# Patient Record
Sex: FEMALE | Race: WHITE | NOT HISPANIC OR LATINO | Employment: FULL TIME | URBAN - METROPOLITAN AREA
[De-identification: names, ages, dates, MRNs, and addresses within clinical notes are randomized per-mention and may not be internally consistent; named-entity substitution may affect disease eponyms.]

---

## 2017-09-14 ENCOUNTER — ALLSCRIPTS OFFICE VISIT (OUTPATIENT)
Dept: OTHER | Facility: OTHER | Age: 42
End: 2017-09-14

## 2017-09-14 LAB — HBA1C MFR BLD HPLC: 6.1 %

## 2018-01-11 ENCOUNTER — GENERIC CONVERSION - ENCOUNTER (OUTPATIENT)
Dept: OTHER | Facility: OTHER | Age: 43
End: 2018-01-11

## 2018-01-11 DIAGNOSIS — Z12.31 ENCOUNTER FOR SCREENING MAMMOGRAM FOR MALIGNANT NEOPLASM OF BREAST: ICD-10-CM

## 2018-01-11 DIAGNOSIS — R19.09 OTHER INTRA-ABDOMINAL AND PELVIC SWELLING, MASS AND LUMP: ICD-10-CM

## 2018-01-11 NOTE — MISCELLANEOUS
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)   3  Encounter for routine gynecological examination (V72 31) (Z01 419)   4  Never A Smoker   5  Prediabetes (790 29) (R73 09)   6  Screening for skin condition (V82 0) (Z13 89)   7  Seborrheic keratosis (702 19) (L82 1)   8  Skin tag (701 9) (L91 8)   9  Visit for screening mammogram (V76 12) (Z12 31)    Current Meds   1  Albuterol Sulfate (2 5 MG/3ML) 0 083% Inhalation Nebulization Solution; Therapy: (Recorded:37Awu8001) to Recorded   2  Allergy TABS; Therapy: (Recorded:19Iiq9438) to Recorded   3  Seasonique 0 15-0 03 &0 01 MG Oral Tablet; Take 1 tablet daily; Therapy: [id number] to (Evaluate:16Jun2016)  Requested for: [id number]; Last   Rx:22Jun2015 Ordered    Allergies    1  Aspirin TABS   2   Contrast Media Ready-Box MISC    Plan  Encounter for routine gynecological examination    · From  Seasonique 0 15-0 03 &0 01 MG Oral Tablet Take 1 tablet daily To  Camrese 0 15-0 03 &0 01 MG Oral Tablet Take 1 tablet daily    Signatures   Electronically signed by : Ambar Quick, ; Jun 29 2016  8:16AM EST                       (Author)

## 2018-01-13 VITALS
SYSTOLIC BLOOD PRESSURE: 135 MMHG | TEMPERATURE: 99.2 F | BODY MASS INDEX: 45.84 KG/M2 | HEART RATE: 111 BPM | WEIGHT: 275.13 LBS | OXYGEN SATURATION: 99 % | HEIGHT: 65 IN | DIASTOLIC BLOOD PRESSURE: 85 MMHG

## 2018-01-14 DIAGNOSIS — R53.83 OTHER FATIGUE: ICD-10-CM

## 2018-01-14 DIAGNOSIS — Z11.59 ENCOUNTER FOR SCREENING FOR OTHER VIRAL DISEASES: ICD-10-CM

## 2018-01-14 DIAGNOSIS — R73.03 PREDIABETES: ICD-10-CM

## 2018-01-14 NOTE — RESULT NOTES
Message   Recorded as Task   Date: 11/07/2016 11:55 AM, Created By: Cira Peterson   Task Name: Verify Patient Results   Assigned To: Hallie Foley   Regarding Patient: Oneal Singh, Status: Active   CommentPalmilincoln Bills - 07 Nov 2016 11:55 AM        Hallie Foley - 08 Nov 2016 10:57 AM     TASK EDITED  card sent that pap was normal        Signatures   Electronically signed by : Jovi Martins CNM;  Nov 8 2016 10:57AM EST                       (Author)

## 2018-01-24 VITALS
HEIGHT: 65 IN | BODY MASS INDEX: 45.48 KG/M2 | SYSTOLIC BLOOD PRESSURE: 122 MMHG | DIASTOLIC BLOOD PRESSURE: 70 MMHG | WEIGHT: 273 LBS

## 2018-01-30 ENCOUNTER — TELEPHONE (OUTPATIENT)
Dept: OBGYN CLINIC | Facility: CLINIC | Age: 43
End: 2018-01-30

## 2018-01-30 NOTE — TELEPHONE ENCOUNTER
Called rx to pharm, pt aware     Pt called as seasonique was not sent, but ordered by louis at 01/11 visit

## 2018-08-23 ENCOUNTER — APPOINTMENT (OUTPATIENT)
Dept: LAB | Facility: CLINIC | Age: 43
End: 2018-08-23
Payer: COMMERCIAL

## 2018-08-23 ENCOUNTER — TRANSCRIBE ORDERS (OUTPATIENT)
Dept: LAB | Facility: CLINIC | Age: 43
End: 2018-08-23

## 2018-08-23 DIAGNOSIS — R53.83 OTHER FATIGUE: ICD-10-CM

## 2018-08-23 DIAGNOSIS — Z11.59 ENCOUNTER FOR SCREENING FOR OTHER VIRAL DISEASES: ICD-10-CM

## 2018-08-23 DIAGNOSIS — R73.03 PREDIABETES: ICD-10-CM

## 2018-08-23 LAB
ALBUMIN SERPL BCP-MCNC: 3.4 G/DL (ref 3.5–5)
ALP SERPL-CCNC: 99 U/L (ref 46–116)
ALT SERPL W P-5'-P-CCNC: 22 U/L (ref 12–78)
ANION GAP SERPL CALCULATED.3IONS-SCNC: 8 MMOL/L (ref 4–13)
AST SERPL W P-5'-P-CCNC: 12 U/L (ref 5–45)
BASOPHILS # BLD AUTO: 0.02 THOUSANDS/ΜL (ref 0–0.1)
BASOPHILS NFR BLD AUTO: 0 % (ref 0–1)
BILIRUB SERPL-MCNC: 0.73 MG/DL (ref 0.2–1)
BUN SERPL-MCNC: 13 MG/DL (ref 5–25)
CALCIUM SERPL-MCNC: 9.2 MG/DL (ref 8.3–10.1)
CHLORIDE SERPL-SCNC: 106 MMOL/L (ref 100–108)
CO2 SERPL-SCNC: 25 MMOL/L (ref 21–32)
CREAT SERPL-MCNC: 0.75 MG/DL (ref 0.6–1.3)
EOSINOPHIL # BLD AUTO: 0.2 THOUSAND/ΜL (ref 0–0.61)
EOSINOPHIL NFR BLD AUTO: 3 % (ref 0–6)
ERYTHROCYTE [DISTWIDTH] IN BLOOD BY AUTOMATED COUNT: 13.7 % (ref 11.6–15.1)
EST. AVERAGE GLUCOSE BLD GHB EST-MCNC: 148 MG/DL
GFR SERPL CREATININE-BSD FRML MDRD: 99 ML/MIN/1.73SQ M
GLUCOSE P FAST SERPL-MCNC: 146 MG/DL (ref 65–99)
HBA1C MFR BLD: 6.8 % (ref 4.2–6.3)
HCT VFR BLD AUTO: 37.6 % (ref 34.8–46.1)
HGB BLD-MCNC: 11.7 G/DL (ref 11.5–15.4)
IMM GRANULOCYTES # BLD AUTO: 0.02 THOUSAND/UL (ref 0–0.2)
IMM GRANULOCYTES NFR BLD AUTO: 0 % (ref 0–2)
LYMPHOCYTES # BLD AUTO: 1.61 THOUSANDS/ΜL (ref 0.6–4.47)
LYMPHOCYTES NFR BLD AUTO: 20 % (ref 14–44)
MCH RBC QN AUTO: 28.3 PG (ref 26.8–34.3)
MCHC RBC AUTO-ENTMCNC: 31.1 G/DL (ref 31.4–37.4)
MCV RBC AUTO: 91 FL (ref 82–98)
MONOCYTES # BLD AUTO: 0.44 THOUSAND/ΜL (ref 0.17–1.22)
MONOCYTES NFR BLD AUTO: 5 % (ref 4–12)
NEUTROPHILS # BLD AUTO: 5.81 THOUSANDS/ΜL (ref 1.85–7.62)
NEUTS SEG NFR BLD AUTO: 72 % (ref 43–75)
NRBC BLD AUTO-RTO: 0 /100 WBCS
PLATELET # BLD AUTO: 236 THOUSANDS/UL (ref 149–390)
PMV BLD AUTO: 12.8 FL (ref 8.9–12.7)
POTASSIUM SERPL-SCNC: 4 MMOL/L (ref 3.5–5.3)
PROT SERPL-MCNC: 7.3 G/DL (ref 6.4–8.2)
RBC # BLD AUTO: 4.13 MILLION/UL (ref 3.81–5.12)
SODIUM SERPL-SCNC: 139 MMOL/L (ref 136–145)
T4 FREE SERPL-MCNC: 0.85 NG/DL (ref 0.76–1.46)
TSH SERPL DL<=0.05 MIU/L-ACNC: 3.81 UIU/ML (ref 0.36–3.74)
WBC # BLD AUTO: 8.1 THOUSAND/UL (ref 4.31–10.16)

## 2018-08-23 PROCEDURE — 84443 ASSAY THYROID STIM HORMONE: CPT

## 2018-08-23 PROCEDURE — 80053 COMPREHEN METABOLIC PANEL: CPT

## 2018-08-23 PROCEDURE — 86803 HEPATITIS C AB TEST: CPT

## 2018-08-23 PROCEDURE — 84439 ASSAY OF FREE THYROXINE: CPT

## 2018-08-23 PROCEDURE — 83036 HEMOGLOBIN GLYCOSYLATED A1C: CPT

## 2018-08-23 PROCEDURE — 85025 COMPLETE CBC W/AUTO DIFF WBC: CPT

## 2018-08-24 LAB — HCV AB SER QL: NORMAL

## 2018-08-31 ENCOUNTER — OFFICE VISIT (OUTPATIENT)
Dept: INTERNAL MEDICINE CLINIC | Facility: CLINIC | Age: 43
End: 2018-08-31
Payer: COMMERCIAL

## 2018-08-31 VITALS
BODY MASS INDEX: 45.48 KG/M2 | WEIGHT: 273 LBS | OXYGEN SATURATION: 98 % | TEMPERATURE: 99.7 F | DIASTOLIC BLOOD PRESSURE: 84 MMHG | RESPIRATION RATE: 18 BRPM | HEIGHT: 65 IN | HEART RATE: 97 BPM | SYSTOLIC BLOOD PRESSURE: 126 MMHG

## 2018-08-31 DIAGNOSIS — J45.909 ASTHMA, UNSPECIFIED ASTHMA SEVERITY, UNSPECIFIED WHETHER COMPLICATED, UNSPECIFIED WHETHER PERSISTENT: ICD-10-CM

## 2018-08-31 DIAGNOSIS — E11.9 TYPE 2 DIABETES MELLITUS WITHOUT COMPLICATION, WITHOUT LONG-TERM CURRENT USE OF INSULIN (HCC): ICD-10-CM

## 2018-08-31 DIAGNOSIS — E66.01 OBESITY, MORBID (HCC): Primary | ICD-10-CM

## 2018-08-31 PROBLEM — R19.09 RIGHT GROIN MASS: Status: ACTIVE | Noted: 2018-01-11

## 2018-08-31 PROBLEM — R53.83 OTHER FATIGUE: Status: ACTIVE | Noted: 2018-08-31

## 2018-08-31 PROBLEM — E78.1 HYPERTRIGLYCERIDEMIA: Status: ACTIVE | Noted: 2018-08-31

## 2018-08-31 PROCEDURE — 1036F TOBACCO NON-USER: CPT | Performed by: NURSE PRACTITIONER

## 2018-08-31 PROCEDURE — 3008F BODY MASS INDEX DOCD: CPT | Performed by: NURSE PRACTITIONER

## 2018-08-31 PROCEDURE — 99214 OFFICE O/P EST MOD 30 MIN: CPT | Performed by: NURSE PRACTITIONER

## 2018-08-31 RX ORDER — ALBUTEROL SULFATE 2.5 MG/3ML
SOLUTION RESPIRATORY (INHALATION)
COMMUNITY
End: 2021-01-08 | Stop reason: SDUPTHER

## 2018-08-31 NOTE — PATIENT INSTRUCTIONS
1  Diabetes-A1c 6 8  Medications discussed, not interested at this time, would like to try diet and exercise first and recheck A1c in three months  Will refer to diabetes education  Risks benefits discussed  2  Elevated TSH-will recheck level  3  Asthma-controlled, does not need rescue inhaler  4  Obesity-motivated to lose weight  [facility]  Instructed on how to increase dosage on a weekly basis  Side effects discussed  Please make sure you are watching your diet-limiting concentrated sweets and white starches as we discussed  Start exercising daily-even a short walk is helpful  Down load the Lose It vineet to assist in calorie counting  Set a fitness goal such as a 5k, and you can use the Couch to [address] [place] [address] check for Lyme, mono  Follow up in three months

## 2018-08-31 NOTE — PROGRESS NOTES
Assessment/Plan:    1  Diabetes-A1c 6 8  Medications discussed, not interested at this time, would like to try diet and exercise first and recheck A1c in three months  Will refer to diabetes education  Risks benefits discussed  2  Elevated TSH-will recheck level  3  Asthma-controlled, does not need rescue inhaler  4  Obesity-motivated to lose weight  Will start [address] [facility]  Instructed on how to increase dosage on a weekly basis  Side effects discussed  Please make sure you are watching your diet-limiting concentrated sweets and white starches as we discussed  Start exercising daily-even a short walk is helpful  Down load the Send Word Now It vineet to assist in calorie counting  Set a fitness goal such as a 5k, and you can use the Couch to [address] [place] [address] check for Lyme, mono  Follow up in three months  Diagnoses and all orders for this visit:    Obesity, morbid ([facility] 75 )  -     liraglutide (SAXENDA) injection; Start at 0 6 mg sc QD x 1 week, then increase by 0 6 mg per week until 3 mg daily is reached  -     Anaplasma Phagocytophilum, PCR; Future  -     EBV acute panel; Future  -     Lyme Antibody Profile with reflex to WB; Future  -     TSH, 3rd generation with Free T4 reflex; Future    Type 2 diabetes mellitus without complication, without long-term current use of insulin ([place] [address] )  -     Ambulatory referral to medical nutrition therapy for diabetes; Future    Asthma, unspecified asthma severity, unspecified whether complicated, unspecified whether persistent    Other orders  -     albuterol (2 5 mg/3 mL) 0 083 % nebulizer solution; Inhale        The patient was counseled regarding instructions for management, risk factor reductions, patient and family education,impressions, risks and benefits of treatment options, side effects of medications, importance of compliance with treatment  The treatment plan was reviewed with the patient/guardian and patient/guardian understands and agrees with the treatment plan  Current Outpatient Prescriptions:     albuterol (2 5 mg/3 mL) 0 083 % nebulizer solution, Inhale, Disp: , Rfl:     liraglutide (SAXENDA) injection, Start at 0 6 mg sc QD x 1 week, then increase by 0 6 mg per week until 3 mg daily is reached , Disp: 1 pen, Rfl: 5    Subjective:      Patient ID: Gary Pinzon is a 44 y o  female  Humberto Sanon is here to follow up on an isolated elevated blood pressure  She also has been feeling very fatigued over the past few weeks with low grade fever and swollen glands  The following portions of the patient's history were reviewed and updated as appropriate:   She has a past medical history of Bell's palsy; Diabetes mellitus during pregnancy; and Migraine  ,   does not have any pertinent problems on file  ,   has a past surgical history that includes  section and Hysteroscopy  ,  family history includes Cervical cancer in her maternal aunt; Heart disease in her father; Hyperlipidemia in her father; Hypertension in her father; Migraines in her mother  ,   reports that she has never smoked  She has never used smokeless tobacco  She reports that she drinks alcohol  She reports that she does not use drugs  ,  is allergic to aspirin and iodinated diagnostic agents       Review of Systems   Constitutional: Negative  Respiratory: Negative  Cardiovascular: Negative  Musculoskeletal: Negative  Psychiatric/Behavioral: Negative            Objective:  /84 (BP Location: Left arm)   Pulse 97   Temp 99 7 °F (37 6 °C)   Resp 18   Ht 5' 5" (1 651 m)   Wt 124 kg (273 lb)   SpO2 98%   BMI 45 43 kg/m²     Lab Review  Transcribe Orders on 2018   Component Date Value    WBC 2018 8 10     RBC 2018 4 13     Hemoglobin 2018 11 7     Hematocrit 2018 37 6     MCV 2018 91     MCH 2018 28 3     MCHC 2018 31 1*    RDW 2018 13 7     MPV 2018 12 8*    Platelets  236     nRBC 2018 0     Neutrophils Relative 08/23/2018 72     Immat GRANS % 08/23/2018 0     Lymphocytes Relative 08/23/2018 20     Monocytes Relative 08/23/2018 5     Eosinophils Relative 08/23/2018 3     Basophils Relative 08/23/2018 0     Neutrophils Absolute 08/23/2018 5 81     Immature Grans Absolute 08/23/2018 0 02     Lymphocytes Absolute 08/23/2018 1 61     Monocytes Absolute 08/23/2018 0 44     Eosinophils Absolute 08/23/2018 0 20     Basophils Absolute 08/23/2018 0 02     Sodium 08/23/2018 139     Potassium 08/23/2018 4 0     Chloride 08/23/2018 106     CO2 08/23/2018 25     ANION GAP 08/23/2018 8     BUN 08/23/2018 13     Creatinine 08/23/2018 0 75     Glucose, Fasting 08/23/2018 146*    Calcium 08/23/2018 9 2     AST 08/23/2018 12     ALT 08/23/2018 22     Alkaline Phosphatase 08/23/2018 99     Total Protein 08/23/2018 7 3     Albumin 08/23/2018 3 4*    Total Bilirubin 08/23/2018 0 73     eGFR 08/23/2018 99     TSH 3RD GENERATON 08/23/2018 3 810*    Hepatitis C Ab 08/23/2018 Non-reactive     Hemoglobin A1C 08/23/2018 6 8*    EAG 08/23/2018 148     Free T4 08/23/2018 0 85         Imaging: No results found  Physical Exam   Constitutional: She is oriented to person, place, and time  She appears well-developed and well-nourished  Cardiovascular: Normal rate, regular rhythm, normal heart sounds and intact distal pulses  Pulmonary/Chest: Effort normal and breath sounds normal    Musculoskeletal: Normal range of motion  Neurological: She is alert and oriented to person, place, and time  She has normal reflexes  Psychiatric: She has a normal mood and affect   Her behavior is normal  Judgment and thought content normal

## 2019-01-24 ENCOUNTER — TRANSCRIBE ORDERS (OUTPATIENT)
Dept: LAB | Facility: CLINIC | Age: 44
End: 2019-01-24

## 2019-01-24 ENCOUNTER — OFFICE VISIT (OUTPATIENT)
Dept: INTERNAL MEDICINE CLINIC | Facility: CLINIC | Age: 44
End: 2019-01-24
Payer: COMMERCIAL

## 2019-01-24 ENCOUNTER — APPOINTMENT (OUTPATIENT)
Dept: LAB | Facility: CLINIC | Age: 44
End: 2019-01-24
Payer: COMMERCIAL

## 2019-01-24 VITALS
BODY MASS INDEX: 46.65 KG/M2 | WEIGHT: 280 LBS | SYSTOLIC BLOOD PRESSURE: 180 MMHG | HEART RATE: 90 BPM | OXYGEN SATURATION: 99 % | DIASTOLIC BLOOD PRESSURE: 82 MMHG | RESPIRATION RATE: 18 BRPM | HEIGHT: 65 IN | TEMPERATURE: 98.9 F

## 2019-01-24 DIAGNOSIS — E03.9 HYPOTHYROIDISM, UNSPECIFIED TYPE: ICD-10-CM

## 2019-01-24 DIAGNOSIS — E66.01 MORBID OBESITY (HCC): ICD-10-CM

## 2019-01-24 DIAGNOSIS — E66.01 OBESITY, MORBID (HCC): ICD-10-CM

## 2019-01-24 DIAGNOSIS — R53.83 OTHER FATIGUE: ICD-10-CM

## 2019-01-24 DIAGNOSIS — E11.9 TYPE 2 DIABETES MELLITUS WITHOUT COMPLICATION, WITHOUT LONG-TERM CURRENT USE OF INSULIN (HCC): ICD-10-CM

## 2019-01-24 DIAGNOSIS — E66.01 OBESITY, MORBID (HCC): Primary | ICD-10-CM

## 2019-01-24 DIAGNOSIS — E78.1 HYPERTRIGLYCERIDEMIA: ICD-10-CM

## 2019-01-24 LAB
SL AMB POCT HEMOGLOBIN AIC: 6.6 (ref ?–6.5)
T4 FREE SERPL-MCNC: 0.91 NG/DL (ref 0.76–1.46)
TSH SERPL DL<=0.05 MIU/L-ACNC: 3.97 UIU/ML (ref 0.36–3.74)

## 2019-01-24 PROCEDURE — 3044F HG A1C LEVEL LT 7.0%: CPT | Performed by: INTERNAL MEDICINE

## 2019-01-24 PROCEDURE — 3008F BODY MASS INDEX DOCD: CPT | Performed by: INTERNAL MEDICINE

## 2019-01-24 PROCEDURE — 84443 ASSAY THYROID STIM HORMONE: CPT

## 2019-01-24 PROCEDURE — 87798 DETECT AGENT NOS DNA AMP: CPT

## 2019-01-24 PROCEDURE — 83036 HEMOGLOBIN GLYCOSYLATED A1C: CPT | Performed by: INTERNAL MEDICINE

## 2019-01-24 PROCEDURE — 1036F TOBACCO NON-USER: CPT | Performed by: INTERNAL MEDICINE

## 2019-01-24 PROCEDURE — 99214 OFFICE O/P EST MOD 30 MIN: CPT | Performed by: INTERNAL MEDICINE

## 2019-01-24 PROCEDURE — 86665 EPSTEIN-BARR CAPSID VCA: CPT

## 2019-01-24 PROCEDURE — 86664 EPSTEIN-BARR NUCLEAR ANTIGEN: CPT

## 2019-01-24 PROCEDURE — 86618 LYME DISEASE ANTIBODY: CPT

## 2019-01-24 PROCEDURE — 36415 COLL VENOUS BLD VENIPUNCTURE: CPT

## 2019-01-24 PROCEDURE — 86663 EPSTEIN-BARR ANTIBODY: CPT

## 2019-01-24 PROCEDURE — 84439 ASSAY OF FREE THYROXINE: CPT

## 2019-01-24 RX ORDER — LEVOTHYROXINE SODIUM 0.03 MG/1
25 TABLET ORAL DAILY
Qty: 30 TABLET | Refills: 2 | Status: SHIPPED | OUTPATIENT
Start: 2019-01-24 | End: 2019-04-20 | Stop reason: SDUPTHER

## 2019-01-24 NOTE — PATIENT INSTRUCTIONS
Basic Carbohydrate Counting   AMBULATORY CARE:   Carbohydrate counting  is a way to plan your meals by counting the amount of carbohydrate in foods  Carbohydrates are the sugars, starches, and fiber found in fruit, grains, vegetables, and milk products  Carbohydrates increase your blood sugar levels  Carbohydrate counting can help you eat the right amount of carbohydrate to keep your blood sugar levels under control  What you need to know about planning meals using carbohydrate counting:  · A dietitian or healthcare provider will help you develop a healthy meal plan that works best for you  You will be taught how much carbohydrate to eat or drink for each meal and snack  Your meal plan will be based on your age, weight, usual food intake, and physical activity level  If you have diabetes, it will also include your blood sugar levels and diabetes medicine  Once you know how much carbohydrate you should eat, you can decide what type of food you want to eat  · You will need to know what foods contain carbohydrate and how much they contain  Keep track of the amount of carbohydrate in meals and snacks in order to follow your meal plan  Do not avoid carbohydrates or skip meals  Your blood sugar may fall too low if you do not eat enough carbohydrate or you skip meals  Foods that contain carbohydrate:   · Breads:  Each serving of food listed below contains about 15 g of carbohydrate   ¨ 1 slice of bread (1 ounce) or 1 flour or corn tortilla (6 inch)    ¨ ½ of a hamburger bun or ¼ of a large bagel (about 1 ounce)    ¨ 1 pancake (about 4 inches across and ¼ inch thick)    · Cereals and grains:  Serving sizes of ready-to-eat cereals vary  Look at the serving size and the total carbohydrate amount listed on the food label  Each serving of food listed below contains about 15 g of carbohydrate       ¨ ¾ cup of dry, unsweetened, ready-to-eat cereal or ¼ cup of low-fat granola     ¨ ½ cup of oatmeal or other cooked cereal ¨ ? cup of cooked rice or pasta    · Starchy vegetables and beans:  Each serving of food listed below contains about 15 g of carbohydrate   ¨ ½ cup of corn, green peas, sweet potatoes, or mashed potatoes    ¨ ¼ of a large baked potato    ¨ ½ cup of beans, lentils, and peas (garbanzo, lewis, kidney, white, split, black-eyed)    · Crackers and snacks:  Each serving of food listed below contains about 15 g of carbohydrate   ¨ 3 carlos cracker squares or 8 animal crackers     ¨ 6 saltine-type crackers    ¨ 3 cups of popcorn or ¾ ounce of pretzels, potato chips, or tortilla chips    · Fruit:  Each serving of food listed below contains about 15 g of carbohydrate   ¨ 1 small (4 ounce) piece of fresh fruit or ¾ to 1 cup of fresh fruit    ¨ ½ cup of canned or frozen fruit, packed in natural juice    ¨ ½ cup (4 ounces) of unsweetened fruit juice    ¨ 2 tablespoons of dried fruit    · Desserts or sugary foods:  Each serving of food listed below contains about 15 g of carbohydrate   ¨ 2-inch square unfrosted cake or brownie     ¨ 2 small cookies    ¨ ½ cup of ice cream, frozen yogurt, or nondairy frozen yogurt    ¨ ¼ cup of sherbet or sorbet    ¨ 1 tablespoon of regular syrup, jam, or jelly    ¨ 2 tablespoons of light syrup    · Milk and yogurt:  Foods from the milk group contain about 12 g of carbohydrate per serving  ¨ 1 cup of fat-free or low-fat milk    ¨ 1 cup of soy milk    ¨ ? cup of fat-free, yogurt sweetened with artificial sweetener    · Non-starchy vegetables:  Each serving contains about 5 g of carbohydrate   Three servings of non-starch vegetables count as 1 carbohydrate serving  ¨ ½ cup of cooked vegetables or 1 cup of raw vegetables  This includes beets, broccoli, cabbage, cauliflower, cucumber, mushrooms, tomatoes, and zucchini    ¨ ½ cup of vegetable juice  How to use carbohydrate counting to plan meals:   · Count carbohydrate amounts using serving sizes:      ¨ Pasta dinner example:   You plan to have pasta, tossed salad, and an 8-ounce glass of milk  Your healthcare provider tells you that you may have 4 carbohydrate servings for dinner  One carbohydrate serving of pasta is ? cup  One cup of pasta will equal 3 carbohydrate servings  An 8-ounce glass of milk will count as 1 carbohydrate serving  These amounts of food would equal 4 carbohydrate servings  One cup of tossed salad does not count toward your carbohydrate servings  · Count carbohydrate amounts using food labels:  Find the total amount of carbohydrate in a packaged food by reading the food label  Food labels tell you the serving size of the food and the total carbohydrate amount in each serving  Find the serving size on the food label and then decide how many servings you will eat  Multiply the number of servings you plan to eat by the carbohydrate amount per serving  ¨ Granola bar snack example: Your meal plan allows you to have 2 carbohydrate servings (30 grams) of carbohydrate for a snack  You plan to eat 1 package of granola bars, which contains 2 bars  According to the food label, the serving size of food in this package is 1 bar  Each serving (1 bar) contains 25 grams of carbohydrate  The total amount of carbohydrate in this package of granola bars would be 50 g  Based on your meal plan, you should eat only 1 bar  Follow up with your healthcare provider as directed:  Write down your questions so you remember to ask them during your visits  © 2017 [address] Information is for End User's use only and may not be sold, redistributed or otherwise used for commercial purposes  All illustrations and images included in CareNotes® are the copyrighted property of [employer] , [employer]  or Drake Bonilla  The above information is an  only  It is not intended as medical advice for individual conditions or treatments   Talk to your doctor, nurse or pharmacist before following any medical regimen to see if it is safe and effective for you  Hypotension   WHAT YOU NEED TO KNOW:   What is hypotension? Hypotension is a condition that causes your blood pressure (BP) to drop lower than it should be  Hypotension may be mild, serious, or life-threatening  What are the most common types of hypotension? · Acute:  Acute hypotension is a sudden drop in your BP that may be life-threatening  · Constitutional:  Constitutional hypotension means your BP is lower than it should be most or all of the time  This is a chronic condition that occurs with no known medical cause  · Orthostatic:  Orthostatic hypotension normally occurs when you stand up from a sitting or lying position  It is also called postural hypotension  · Postprandial:  Postprandial hypotension means your BP becomes too low after you eat a meal  Your BP may drop within 2 hours after you eat, and is more common when you eat meals high in carbohydrates  What causes hypotension? · Anemia or blood loss    · Nervous system, heart, or adrenal disorders    · Dehydration from not drinking enough liquids, frequent vomiting, diarrhea, or severe burns    · Some medicines, such as those used to treat high blood pressure, heart conditions, pain, depression, or cancer    · A blood infection (sepsis)  What increases my risk for hypotension? · Increasing age    · Drug and alcohol use    · Being bedridden for a long period of time    · Low body weight    · Hemodialysis    · Medical conditions such as diabetes, Parkinson disease, and Alzheimer disease  What are the signs and symptoms of hypotension?    · Feeling anxious, nauseated, tired, or weak    · Lightheadedness, dizziness, or fainting    · Increased sweating, palpitations (fast, forceful heartbeats), tremors, or a seizure     · Headache or pain in your neck, shoulders, chest, lower back, buttocks, or legs    · Blurred vision or changes in vision    · Confusion, decreased memory, or inability to pay attention  How is hypotension diagnosed? Your healthcare provider will ask about your symptoms, health conditions, and medicines  Tell him how often you have symptoms, and if they change during the day  Tell him if you recently have had diarrhea, vomiting, or blood loss  Your healthcare provider will carefully examine you, listen to your heart, and may check your eyes  He may check your body movements and sensations (ability to feel something that touches you)  You may also need the following:  · Blood pressure testing: This may be done while you lie down, sit, and stand  You may need to wear a BP monitor to record your BP for up to 24 hours  · Tilt table testing: You are secured on a table that changes your position  Your BP is checked when the table moves you into each position  What tests may help find the cause of my hypotension? Many times, hypotension is a symptom of another condition  You may need any of the following tests to find the cause of your hypotension:  · Blood tests:  A sample of your blood or urine may be checked for anemia or other conditions causing your hypotension  · EKG: This test records the electrical activity of your heart  It is used to check for damage or other heart problems that may be causing your hypotension  · Autonomic nervous system tests:  Your healthcare provider may check for changes in how fast your heart beats when you take deep breaths  He may also check for changes in your BP while you put your hand in ice cold water  · 24 hour urine test: During this test you will need to collect all of your urine for 24 hours  You will urinate into a container and the urine will be put into a jug  The jug will need to be kept cold  If you urinate during the night, you will need to save that urine  Caregivers will measure and record how much you urinate  At the end of 24 hours, the urine will be sent to a lab for tests      · Echocardiogram:  This is a type of ultrasound, also called an echo  An ultrasound uses sound waves to show pictures of your heart on a monitor  An ultrasound may be done to show how your heart moves when it beats  How is hypotension treated? Your healthcare provider will work with you to find the cause of your hypotension and help treat your symptoms  You may need the following:  · Compression stockings or abdominal binder: These may help blood return to your heart and decrease your hypotension  · IV fluids: These may be used to increase your BP if you are dehydrated or have blood loss or sepsis  · Medicines:      ¨ Alpha-adrenoreceptor agonists: These medicines may increase your BP and decrease your symptoms  ¨ Steroids: This medicine helps prevent salt loss from your body  Steroids may also help increase the amount of fluid in your body and raise your BP  ¨ Vasopressors: These medicines help constrict (make smaller) your blood vessels and increase your BP  Vasopressor medicines may increase the blood flow to your brain and help decrease your symptoms  ¨ Antidiuretic hormone: This medicine helps control your BP and helps decrease your need to urinate during the night  ¨ Antiparkinson medicine: This medicine may help increase your standing BP and decrease your symptoms  What are the risks of hypotension? Without treatment, your symptoms may get worse  You may faint or fall often, which can lead to injuries, such as a broken bone  You may be at increased risk for depression, confusion, and memory problems  Hypotension may cause decreased blood flow to your brain and heart  This may lead to a stroke or heart attack and can be life-threatening  Sepsis-related hypotension is life-threatening without treatment  How can I manage my symptoms? · Change your position slowly:  When you get out of bed, sit up first, then slowly move your legs to the side of the bed  If you are not having any symptoms, slowly stand up   If you have symptoms, sit down right away  · Avoid straining:  Activities and movements that cause you to strain can cause a drop in your BP  Activities to avoid include lifting, coughing, and other movements that increase the feeling of pressure in your chest     · Avoid the heat: This can cause a decrease in your BP  Stay inside during very hot days, or limit the amount of time you are outside  Do not take hot baths  · Exercise and do physical counter maneuvers:  Ask your healthcare provider about the best exercise plan for you  Physical counter maneuvers may help to increase your BP and increase blood flow to your heart  They include crossing your legs, squatting, and bending at the waist  You can also rise up on your toes while you are standing, and tighten your thigh muscles  · Drink liquids as directed:  Ask your healthcare provider how much liquid to drink each day and which liquids are best for you  Drink 500 milliliters (½ liter) of liquid quickly in the morning or before meals to help increase your BP  Your healthcare provider may tell you to drink 2 cups of coffee with, or after, breakfast and lunch  The caffeine in the coffee can help prevent a drop in your BP  Your healthcare provider may also give you caffeine pills  · Change how you eat meals: If your BP drops after you eat large meals, try to eat smaller meals more often  Eat foods low in carbohydrates and cholesterol to help prevent BP drops after you eat  Ask if you need to increase the amount of sodium (salt) you eat each day  · Raise the head of your bed:  Raise the head of your bed 4 to 8 inches  This can help prevent morning BP drops and decrease the need to urinate during the night  · Do not drink alcohol:  Alcohol can make your symptoms worse  Ask for information if you need help quitting  When should I contact my healthcare provider? · You vomit several times or have diarrhea, and you cannot drink liquid      · You have a fever      · You have new or increased symptoms, such as dizziness, weakness, or fainting  · Your legs, ankles, and feet are swollen, or you gain weight for no known reason  · You have questions or concerns about your condition or care  When should I seek immediate care or call 911? · You become confused or cannot speak  · You urinate very little or not at all  · You have a seizure  · You have chest pain or trouble breathing  · You have changes in vision or cannot see  CARE AGREEMENT:   You have the right to help plan your care  Learn about your health condition and how it may be treated  Discuss treatment options with your caregivers to decide what care you want to receive  You always have the right to refuse treatment  The above information is an  only  It is not intended as medical advice for individual conditions or treatments  Talk to your doctor, nurse or pharmacist before following any medical regimen to see if it is safe and effective for you  © 2017 [facility] Information is for End User's use only and may not be sold, redistributed or otherwise used for commercial purposes  All illustrations and images included in CareNotes® are the copyrighted property of [employer]  or Drake Chad  Obesity   AMBULATORY CARE:   Obesity  is when your body mass index (BMI) is greater than 30  Your healthcare provider will use your height and weight to measure your BMI  The risks of obesity include  many health problems, such as injuries or physical disability  You may need tests to check for the following:  · Diabetes     · High blood pressure or high cholesterol     · Heart disease     · Gallbladder or liver disease     · Cancer of the colon, breast, prostate, liver, or kidney     · Sleep apnea     · Arthritis or gout  Seek care immediately if:   · You have a severe headache, confusion, or difficulty speaking       · You have weakness on one side of your body  · You have chest pain, sweating, or shortness of breath  Contact your healthcare provider if:   · You have symptoms of gallbladder or liver disease, such as pain in your upper abdomen  · You have knee or hip pain and discomfort while walking  · You have symptoms of diabetes, such as intense hunger and thirst, and frequent urination  · You have symptoms of sleep apnea, such as snoring or daytime sleepiness  · You have questions or concerns about your condition or care  Treatment for obesity  focuses on helping you lose weight to improve your health  Even a small decrease in BMI can reduce the risk for many health problems  Your healthcare provider will help you set a weight-loss goal   · Lifestyle changes  are the first step in treating obesity  These include making healthy food choices and getting regular physical activity  Your healthcare provider may suggest a weight-loss program that involves coaching, education, and therapy  · Medicine  may help you lose weight when it is used with a healthy diet and physical activity  · Surgery  can help you lose weight if you are very obese and have other health problems  There are several types of weight-loss surgery  Ask your healthcare provider for more information  Be successful losing weight:   · Set small, realistic goals  An example of a small goal is to walk for 20 minutes 5 days a week  Anther goal is to lose 5% of your body weight  · Tell friends, family members, and coworkers about your goals  and ask for their support  Ask a friend to lose weight with you, or join a weight-loss support group  · Identify foods or triggers that may cause you to overeat , and find ways to avoid them  Remove tempting high-calorie foods from your home and workplace  Place a bowl of fresh fruit on your kitchen counter  If stress causes you to eat, then find other ways to cope with stress  · Keep a diary to track what you eat and drink  Also write down how many minutes of physical activity you do each day  Weigh yourself once a week and record it in your diary  Eating changes: You will need to eat 500 to 1,000 fewer calories each day than you currently eat to lose 1 to 2 pounds a week  The following changes will help you cut calories:  · Eat smaller portions  Use small plates, no larger than 9 inches in diameter  Fill your plate half full of fruits and vegetables  Measure your food using measuring cups until you know what a serving size looks like  · Eat 3 meals and 1 or 2 snacks each day  Plan your meals in advance  Jania Rodarte and eat at home most of the time  Eat slowly  · Eat fruits and vegetables at every meal   They are low in calories and high in fiber, which makes you feel full  Do not add butter, margarine, or cream sauce to vegetables  Use herbs to season steamed vegetables  · Eat less fat and fewer fried foods  Eat more baked or grilled chicken and fish  These protein sources are lower in calories and fat than red meat  Limit fast food  Dress your salads with olive oil and vinegar instead of bottled dressing  · Limit the amount of sugar you eat  Do not drink sugary beverages  Limit alcohol  Activity changes:  Physical activity is good for your body in many ways  It helps you burn calories and build strong muscles  It decreases stress and depression, and improves your mood  It can also help you sleep better  Talk to your healthcare provider before you begin an exercise program   · Exercise for at least 30 minutes 5 days a week  Start slowly  Set aside time each day for physical activity that you enjoy and that is convenient for you  It is best to do both weight training and an activity that increases your heart rate, such as walking, bicycling, or swimming  · Find ways to be more active  Do yard work and housecleaning  Walk up the stairs instead of using elevators   Spend your leisure time going to events that require walking, such as outdoor festivals or fairs  This extra physical activity can help you lose weight and keep it off  Follow up with your healthcare provider as directed: You may need to meet with a dietitian  Write down your questions so you remember to ask them during your visits  © 2017 [facility] Information is for End User's use only and may not be sold, redistributed or otherwise used for commercial purposes  All illustrations and images included in CareNotes® are the copyrighted property of [employer] M [employer]  or Drake Bonilla  The above information is an  only  It is not intended as medical advice for individual conditions or treatments  Talk to your doctor, nurse or pharmacist before following any medical regimen to see if it is safe and effective for you  Weight Management   AMBULATORY CARE:   Why it is important to manage your weight:  Being overweight increases your risk of health conditions such as heart disease, high blood pressure, type 2 diabetes, and certain types of cancer  It can also increase your risk for osteoarthritis, sleep apnea, and other respiratory problems  Aim for a slow, steady weight loss  Even a small amount of weight loss can lower your risk of health problems  How to lose weight safely:  A safe and healthy way to lose weight is to eat fewer calories and get regular exercise  You can lose up about 1 pound a week by decreasing the number of calories you eat by 500 calories each day  You can decrease calories by eating smaller portion sizes or by cutting out high-calorie foods  Read labels to find out how many calories are in the foods you eat  You can also burn calories with exercise such as walking, swimming, or biking  You will be more likely to keep weight off if you make these changes part of your lifestyle     Healthy meal plan for weight management:  A healthy meal plan includes a variety of foods, contains fewer calories, and helps you stay healthy  A healthy meal plan includes the following:  · Eat whole-grain foods more often  A healthy meal plan should contain fiber  Fiber is the part of grains, fruits, and vegetables that is not broken down by your body  Whole-grain foods are healthy and provide extra fiber in your diet  Some examples of whole-grain foods are whole-wheat breads and pastas, oatmeal, brown rice, and bulgur  · Eat a variety of vegetables every day  Include dark, leafy greens such as spinach, kale, yarelis greens, and mustard greens  Eat yellow and orange vegetables such as carrots, sweet potatoes, and winter squash  · Eat a variety of fruits every day  Choose fresh or canned fruit (canned in its own juice or light syrup) instead of juice  Fruit juice has very little or no fiber  · Eat low-fat dairy foods  Drink fat-free (skim) milk or 1% milk  Eat fat-free yogurt and low-fat cottage cheese  Try low-fat cheeses such as mozzarella and other reduced-fat cheeses  · Choose meat and other protein foods that are low in fat  Choose beans or other legumes such as split peas or lentils  Choose fish, skinless poultry (chicken or turkey), or lean cuts of red meat (beef or pork)  Before you cook meat or poultry, cut off any visible fat  · Use less fat and oil  Try baking foods instead of frying them  Add less fat, such as margarine, sour cream, regular salad dressing and mayonnaise to foods  Eat fewer high-fat foods  Some examples of high-fat foods include french fries, doughnuts, ice cream, and cakes  · Eat fewer sweets  Limit foods and drinks that are high in sugar  This includes candy, cookies, regular soda, and sweetened drinks  Ways to decrease calories:   · Eat smaller portions  ¨ Use a small plate with smaller servings  ¨ Do not eat second helpings  ¨ When you eat at a restaurant, ask for a box and place half of your meal in the box before you eat      ¨ Share an entrée with someone else     · Replace high-calorie snacks with healthy, low-calorie snacks  ¨ Choose fresh fruit, vegetables, fat-free rice cakes, or air-popped popcorn instead of potato chips, nuts, or chocolate  ¨ Choose water or calorie-free drinks instead of soda or sweetened drinks  · Eat regular meals  Skipping meals can lead to overeating later in the day  Eat a healthy snack in place of a meal if you do not have time to eat a regular meal      · Do not shop for groceries when you are hungry  You may be more likely to make unhealthy food choices  Take a grocery list of healthy foods and shop after you have eaten  Exercise:  Exercise at least 30 minutes per day on most days of the week  Some examples of exercise include walking, biking, dancing, and swimming  You can also fit in more physical activity by taking the stairs instead of the elevator or parking farther away from stores  Ask your healthcare provider about the best exercise plan for you  Other things to consider as you try to lose weight:   · Be aware of situations that may give you the urge to overeat, such as eating while watching television  Find ways to avoid these situations  For example, read a book, go for a walk, or do crafts  · Meet with a weight loss support group or friends who are also trying to lose weight  This may help you stay motivated to continue working on your weight loss goals  © 2017 [address] [employer] Information is for End User's use only and may not be sold, redistributed or otherwise used for commercial purposes  All illustrations and images included in CareNotes® are the copyrighted property of [employer] , [employer]  or Drake Bonilla  The above information is an  only  It is not intended as medical advice for individual conditions or treatments  Talk to your doctor, nurse or pharmacist before following any medical regimen to see if it is safe and effective for you

## 2019-01-24 NOTE — PROGRESS NOTES
Assessment/Plan:    1 type 2 diabetes A1c is now 6 6 will recheck in 2 months diet lifestyle stressed including increasing activity decreasing weight sugar white rice white potatoes and white flour  2  Elevated TSH with symptoms consistent with hypothyroidism levothyroxine given 25 mcg once a day will check labs in 2 months and check labs for Hashimoto's thyroiditis  3  Hypertriglyceridemia will be checking laboratory work in 2 months for her lipids will consider treatment with statin if pregnancy is no longer an option  4  Morbid obesity sexenda Rx given did discuss bariatric surgery would like to try medical treatment if medical treatment is not successful in 1 year will consider bariatric surgery           Diagnoses and all orders for this visit:    Obesity, morbid (HCC)  -     Insulin Pen Needle (B-D ULTRAFINE III SHORT PEN) 31G X 8 MM MISC; by Does not apply route daily  -     LDL cholesterol, direct; Future  -     Lipid Panel with Direct LDL reflex; Future  -     Microalbumin / creatinine urine ratio; Future  -     T3, free; Future  -     T4, free; Future  -     TSH, 3rd generation; Future  -     Anti-microsomal antibody; Future  -     Thyroglobulin; Future  -     Comprehensive metabolic panel; Future    Type 2 diabetes mellitus without complication, without long-term current use of insulin (HCC)  -     LDL cholesterol, direct; Future  -     Lipid Panel with Direct LDL reflex; Future  -     Microalbumin / creatinine urine ratio; Future  -     T3, free; Future  -     T4, free; Future  -     TSH, 3rd generation; Future  -     Anti-microsomal antibody; Future  -     Thyroglobulin; Future  -     Comprehensive metabolic panel; Future  -     POCT hemoglobin A1c  -     Hemoglobin A1C; Future    Other fatigue  -     LDL cholesterol, direct; Future  -     Lipid Panel with Direct LDL reflex; Future  -     Microalbumin / creatinine urine ratio; Future  -     T3, free; Future  -     T4, free;  Future  -     TSH, 3rd generation; Future  -     Anti-microsomal antibody; Future  -     Thyroglobulin; Future  -     Comprehensive metabolic panel; Future    Hypertriglyceridemia    Hypothyroidism, unspecified type  -     levothyroxine 25 mcg tablet; Take 1 tablet (25 mcg total) by mouth daily    Morbid obesity ([address] )        The patient was counseled regarding instructions for management, risk factor reductions, patient and family education,impressions, risks and benefits of treatment options, side effects of medications, importance of compliance with treatment  The treatment plan was reviewed with the patient/guardian and patient/guardian understands and agrees with the treatment plan  Current Outpatient Prescriptions:     albuterol (2 5 mg/3 mL) 0 083 % nebulizer solution, Inhale, Disp: , Rfl:     liraglutide (SAXENDA) injection, Start at 0 6 mg sc QD x 1 week, then increase by 0 6 mg per week until 3 mg daily is reached , Disp: 1 pen, Rfl: 5    Insulin Pen Needle (B-D ULTRAFINE III SHORT PEN) 31G X 8 MM MISC, by Does not apply route daily, Disp: 90 each, Rfl: 1    levothyroxine 25 mcg tablet, Take 1 tablet (25 mcg total) by mouth daily, Disp: 30 tablet, Rfl: 2    Subjective:      Patient ID: Matthew Noriega is a 44 y o  female  Patient had concerns with her sexenda pen she has no family history of medullary carcinoma of the thyroid she has cold intolerance weight gain and fatigue        The following portions of the patient's history were reviewed and updated as appropriate:   She has a past medical history of Bell's palsy; Diabetes mellitus during pregnancy; and Migraine  ,   does not have any pertinent problems on file  ,   has a past surgical history that includes  section and Hysteroscopy  ,  family history includes Cervical cancer in her maternal aunt; Heart disease in her father; Hyperlipidemia in her father; Hypertension in her father; Migraines in her mother  ,   reports that she has never smoked   She has never used smokeless tobacco  She reports that she drinks alcohol  She reports that she does not use drugs  ,  is allergic to aspirin and iodinated diagnostic agents       Review of Systems   Constitutional: Positive for fatigue  Negative for appetite change, chills, fever and unexpected weight change  HENT: Negative for congestion, ear pain, facial swelling, hearing loss, mouth sores, nosebleeds, postnasal drip, rhinorrhea, sinus pain, sore throat, trouble swallowing and voice change  Eyes: Negative for pain, discharge, redness and visual disturbance  Respiratory: Negative for apnea, chest tightness, shortness of breath, wheezing and stridor  Cardiovascular: Negative for chest pain, palpitations and leg swelling  Gastrointestinal: Negative for abdominal distention, abdominal pain, blood in stool, constipation, diarrhea and vomiting  Endocrine: Positive for cold intolerance  Negative for heat intolerance, polydipsia, polyphagia and polyuria  Genitourinary: Negative for difficulty urinating, dysuria, flank pain, frequency, genital sores, hematuria and urgency  Musculoskeletal: Negative for arthralgias and back pain  Skin: Negative for rash and wound  Allergic/Immunologic: Negative for environmental allergies, food allergies and immunocompromised state  Neurological: Negative for dizziness, tremors, seizures, syncope, facial asymmetry, speech difficulty, weakness, light-headedness, numbness and headaches  Hematological: Negative for adenopathy  Does not bruise/bleed easily  Psychiatric/Behavioral: Negative for agitation, behavioral problems, dysphoric mood, hallucinations, self-injury, sleep disturbance and suicidal ideas  The patient is not hyperactive            Objective:  BP (!) 180/82 (BP Location: Left arm, Patient Position: Sitting)   Pulse 90   Temp 98 9 °F (37 2 °C) (Tympanic)   Resp 18   Ht 5' 5" (1 651 m)   Wt 127 kg (280 lb)   LMP 01/23/2019   SpO2 99%   BMI 46 59 kg/m² Lab Review  Transcribe Orders on 08/23/2018   Component Date Value    WBC 08/23/2018 8 10     RBC 08/23/2018 4 13     Hemoglobin 08/23/2018 11 7     Hematocrit 08/23/2018 37 6     MCV 08/23/2018 91     MCH 08/23/2018 28 3     MCHC 08/23/2018 31 1*    RDW 08/23/2018 13 7     MPV 08/23/2018 12 8*    Platelets 28/19/2544 236     nRBC 08/23/2018 0     Neutrophils Relative 08/23/2018 72     Immat GRANS % 08/23/2018 0     Lymphocytes Relative 08/23/2018 20     Monocytes Relative 08/23/2018 5     Eosinophils Relative 08/23/2018 3     Basophils Relative 08/23/2018 0     Neutrophils Absolute 08/23/2018 5 81     Immature Grans Absolute 08/23/2018 0 02     Lymphocytes Absolute 08/23/2018 1 61     Monocytes Absolute 08/23/2018 0 44     Eosinophils Absolute 08/23/2018 0 20     Basophils Absolute 08/23/2018 0 02     Sodium 08/23/2018 139     Potassium 08/23/2018 4 0     Chloride 08/23/2018 106     CO2 08/23/2018 25     ANION GAP 08/23/2018 8     BUN 08/23/2018 13     Creatinine 08/23/2018 0 75     Glucose, Fasting 08/23/2018 146*    Calcium 08/23/2018 9 2     AST 08/23/2018 12     ALT 08/23/2018 22     Alkaline Phosphatase 08/23/2018 99     Total Protein 08/23/2018 7 3     Albumin 08/23/2018 3 4*    Total Bilirubin 08/23/2018 0 73     eGFR 08/23/2018 99     TSH 3RD GENERATON 08/23/2018 3 810*    Hepatitis C Ab 08/23/2018 Non-reactive     Hemoglobin A1C 08/23/2018 6 8*    EAG 08/23/2018 148     Free T4 08/23/2018 0 85         Imaging: No results found  No orders to display     No results found for this or any previous visit  Physical Exam   Constitutional: She is oriented to person, place, and time  She appears well-developed  Morbid obesity   HENT:   Right Ear: External ear normal    Left Ear: External ear normal    Eyes: Right eye exhibits no discharge  Left eye exhibits no discharge  No scleral icterus  Neck: Carotid bruit is not present   No tracheal deviation present  No thyroid mass and no thyromegaly present  Cardiovascular: Normal rate, regular rhythm, normal heart sounds and intact distal pulses  Exam reveals no gallop and no friction rub  No murmur heard  Pulmonary/Chest: No respiratory distress  She has no wheezes  She has no rales  Musculoskeletal: She exhibits no edema  Lymphadenopathy:     She has no cervical adenopathy  Neurological: She is alert and oriented to person, place, and time  Coordination normal    Psychiatric: She has a normal mood and affect  Her behavior is normal  Judgment and thought content normal    Nursing note and vitals reviewed  BMI Counseling: Body mass index is 46 59 kg/m²  Discussed the patient's BMI with her  The BMI is above average  BMI counseling and education was provided to the patient  Nutrition recommendations include reducing portion sizes, decreasing overall calorie intake, 3-5 servings of fruits/vegetables daily, reducing fast food intake, consuming healthier snacks, decreasing soda and/or juice intake, moderation in carbohydrate intake, increasing intake of lean protein and reducing intake of saturated fat and trans fat  Exercise recommendations include moderate aerobic physical activity for 150 minutes/week, exercising 3-5 times per week and strength training exercises

## 2019-01-25 LAB
EBV EA IGG SER-ACNC: 21.4 U/ML (ref 0–8.9)
EBV NA IGG SER IA-ACNC: 110 U/ML (ref 0–17.9)
EBV PATRN SPEC IB-IMP: ABNORMAL
EBV VCA IGG SER IA-ACNC: >600 U/ML (ref 0–17.9)
EBV VCA IGM SER IA-ACNC: <36 U/ML (ref 0–35.9)

## 2019-01-26 LAB
B BURGDOR IGG SER IA-ACNC: 0.14
B BURGDOR IGM SER IA-ACNC: 0.29

## 2019-01-29 LAB — A PHAGOCYTOPH DNA BLD QL NAA+PROBE: NEGATIVE

## 2019-04-20 DIAGNOSIS — E03.9 HYPOTHYROIDISM, UNSPECIFIED TYPE: ICD-10-CM

## 2019-04-21 RX ORDER — LEVOTHYROXINE SODIUM 0.03 MG/1
TABLET ORAL
Qty: 30 TABLET | Refills: 2 | Status: SHIPPED | OUTPATIENT
Start: 2019-04-21 | End: 2019-05-22 | Stop reason: SDUPTHER

## 2019-05-20 ENCOUNTER — APPOINTMENT (OUTPATIENT)
Dept: LAB | Facility: CLINIC | Age: 44
End: 2019-05-20
Payer: COMMERCIAL

## 2019-05-20 ENCOUNTER — TRANSCRIBE ORDERS (OUTPATIENT)
Dept: LAB | Facility: CLINIC | Age: 44
End: 2019-05-20

## 2019-05-20 DIAGNOSIS — E66.01 OBESITY, MORBID (HCC): ICD-10-CM

## 2019-05-20 DIAGNOSIS — E11.9 TYPE 2 DIABETES MELLITUS WITHOUT COMPLICATION, WITHOUT LONG-TERM CURRENT USE OF INSULIN (HCC): ICD-10-CM

## 2019-05-20 DIAGNOSIS — R53.83 OTHER FATIGUE: ICD-10-CM

## 2019-05-20 LAB
ALBUMIN SERPL BCP-MCNC: 3.4 G/DL (ref 3.5–5)
ALP SERPL-CCNC: 96 U/L (ref 46–116)
ALT SERPL W P-5'-P-CCNC: 21 U/L (ref 12–78)
ANION GAP SERPL CALCULATED.3IONS-SCNC: 4 MMOL/L (ref 4–13)
AST SERPL W P-5'-P-CCNC: 12 U/L (ref 5–45)
BILIRUB SERPL-MCNC: 0.5 MG/DL (ref 0.2–1)
BUN SERPL-MCNC: 12 MG/DL (ref 5–25)
CALCIUM SERPL-MCNC: 8.5 MG/DL (ref 8.3–10.1)
CHLORIDE SERPL-SCNC: 109 MMOL/L (ref 100–108)
CHOLEST SERPL-MCNC: 172 MG/DL (ref 50–200)
CO2 SERPL-SCNC: 25 MMOL/L (ref 21–32)
CREAT SERPL-MCNC: 0.79 MG/DL (ref 0.6–1.3)
CREAT UR-MCNC: 112 MG/DL
EST. AVERAGE GLUCOSE BLD GHB EST-MCNC: 146 MG/DL
GFR SERPL CREATININE-BSD FRML MDRD: 92 ML/MIN/1.73SQ M
GLUCOSE P FAST SERPL-MCNC: 146 MG/DL (ref 65–99)
HBA1C MFR BLD: 6.7 % (ref 4.2–6.3)
HDLC SERPL-MCNC: 54 MG/DL (ref 40–60)
LDLC SERPL CALC-MCNC: 104 MG/DL (ref 0–100)
LDLC SERPL DIRECT ASSAY-MCNC: 101 MG/DL (ref 0–100)
MICROALBUMIN UR-MCNC: 8.2 MG/L (ref 0–20)
MICROALBUMIN/CREAT 24H UR: 7 MG/G CREATININE (ref 0–30)
POTASSIUM SERPL-SCNC: 4 MMOL/L (ref 3.5–5.3)
PROT SERPL-MCNC: 7.2 G/DL (ref 6.4–8.2)
SODIUM SERPL-SCNC: 138 MMOL/L (ref 136–145)
T3FREE SERPL-MCNC: 2.28 PG/ML (ref 2.3–4.2)
T4 FREE SERPL-MCNC: 0.94 NG/DL (ref 0.76–1.46)
TRIGL SERPL-MCNC: 69 MG/DL
TSH SERPL DL<=0.05 MIU/L-ACNC: 4.2 UIU/ML (ref 0.36–3.74)

## 2019-05-20 PROCEDURE — 83721 ASSAY OF BLOOD LIPOPROTEIN: CPT

## 2019-05-20 PROCEDURE — 82043 UR ALBUMIN QUANTITATIVE: CPT

## 2019-05-20 PROCEDURE — 83036 HEMOGLOBIN GLYCOSYLATED A1C: CPT

## 2019-05-20 PROCEDURE — 84432 ASSAY OF THYROGLOBULIN: CPT

## 2019-05-20 PROCEDURE — 86376 MICROSOMAL ANTIBODY EACH: CPT

## 2019-05-20 PROCEDURE — 84443 ASSAY THYROID STIM HORMONE: CPT

## 2019-05-20 PROCEDURE — 3061F NEG MICROALBUMINURIA REV: CPT | Performed by: NURSE PRACTITIONER

## 2019-05-20 PROCEDURE — 82570 ASSAY OF URINE CREATININE: CPT

## 2019-05-20 PROCEDURE — 86800 THYROGLOBULIN ANTIBODY: CPT

## 2019-05-20 PROCEDURE — 80053 COMPREHEN METABOLIC PANEL: CPT

## 2019-05-20 PROCEDURE — 84439 ASSAY OF FREE THYROXINE: CPT

## 2019-05-20 PROCEDURE — 84481 FREE ASSAY (FT-3): CPT

## 2019-05-20 PROCEDURE — 36415 COLL VENOUS BLD VENIPUNCTURE: CPT

## 2019-05-20 PROCEDURE — 80061 LIPID PANEL: CPT

## 2019-05-21 LAB
THYROGLOB AB SERPL-ACNC: <1 IU/ML (ref 0–0.9)
THYROGLOB SERPL-MCNC: 65.9 NG/ML (ref 1.5–38.5)
THYROPEROXIDASE AB SERPL-ACNC: 251 IU/ML (ref 0–34)

## 2019-05-22 DIAGNOSIS — E06.3 HASHIMOTO'S THYROIDITIS: Primary | ICD-10-CM

## 2019-05-22 DIAGNOSIS — E03.9 HYPOTHYROIDISM, UNSPECIFIED TYPE: ICD-10-CM

## 2019-05-22 RX ORDER — LEVOTHYROXINE SODIUM 0.03 MG/1
TABLET ORAL
Qty: 45 TABLET | Refills: 2 | Status: SHIPPED | OUTPATIENT
Start: 2019-05-22 | End: 2019-11-20 | Stop reason: ALTCHOICE

## 2019-06-02 ENCOUNTER — HOSPITAL ENCOUNTER (OUTPATIENT)
Dept: ULTRASOUND IMAGING | Facility: HOSPITAL | Age: 44
Discharge: HOME | End: 2019-06-02
Payer: COMMERCIAL

## 2019-06-02 DIAGNOSIS — E06.3 HASHIMOTO'S THYROIDITIS: ICD-10-CM

## 2019-06-02 DIAGNOSIS — E03.9 HYPOTHYROIDISM, UNSPECIFIED TYPE: ICD-10-CM

## 2019-06-02 PROCEDURE — 76536 US EXAM OF HEAD AND NECK: CPT

## 2019-06-03 ENCOUNTER — OFFICE VISIT (OUTPATIENT)
Dept: INTERNAL MEDICINE CLINIC | Facility: CLINIC | Age: 44
End: 2019-06-03
Payer: COMMERCIAL

## 2019-06-03 VITALS
DIASTOLIC BLOOD PRESSURE: 72 MMHG | HEIGHT: 65 IN | WEIGHT: 267.6 LBS | TEMPERATURE: 99 F | HEART RATE: 91 BPM | BODY MASS INDEX: 44.58 KG/M2 | SYSTOLIC BLOOD PRESSURE: 130 MMHG | OXYGEN SATURATION: 99 %

## 2019-06-03 DIAGNOSIS — R53.83 OTHER FATIGUE: ICD-10-CM

## 2019-06-03 DIAGNOSIS — E11.9 TYPE 2 DIABETES MELLITUS WITHOUT COMPLICATION, WITHOUT LONG-TERM CURRENT USE OF INSULIN (HCC): Primary | ICD-10-CM

## 2019-06-03 PROBLEM — N92.0 MENORRHAGIA WITH REGULAR CYCLE: Status: ACTIVE | Noted: 2019-06-03

## 2019-06-03 PROCEDURE — 3008F BODY MASS INDEX DOCD: CPT | Performed by: NURSE PRACTITIONER

## 2019-06-03 PROCEDURE — 99214 OFFICE O/P EST MOD 30 MIN: CPT | Performed by: NURSE PRACTITIONER

## 2019-06-06 ENCOUNTER — APPOINTMENT (OUTPATIENT)
Dept: LAB | Facility: CLINIC | Age: 44
End: 2019-06-06
Payer: COMMERCIAL

## 2019-06-06 DIAGNOSIS — R53.83 OTHER FATIGUE: ICD-10-CM

## 2019-06-06 DIAGNOSIS — E11.9 TYPE 2 DIABETES MELLITUS WITHOUT COMPLICATION, WITHOUT LONG-TERM CURRENT USE OF INSULIN (HCC): ICD-10-CM

## 2019-06-06 LAB
25(OH)D3 SERPL-MCNC: 13.2 NG/ML (ref 30–100)
FERRITIN SERPL-MCNC: 6 NG/ML (ref 8–388)
IRON SERPL-MCNC: 52 UG/DL (ref 50–170)
TIBC SERPL-MCNC: 443 UG/DL (ref 250–450)
VIT B12 SERPL-MCNC: 340 PG/ML (ref 100–900)

## 2019-06-06 PROCEDURE — 83550 IRON BINDING TEST: CPT

## 2019-06-06 PROCEDURE — 36415 COLL VENOUS BLD VENIPUNCTURE: CPT

## 2019-06-06 PROCEDURE — 82607 VITAMIN B-12: CPT

## 2019-06-06 PROCEDURE — 83540 ASSAY OF IRON: CPT

## 2019-06-06 PROCEDURE — 82728 ASSAY OF FERRITIN: CPT

## 2019-06-06 PROCEDURE — 82306 VITAMIN D 25 HYDROXY: CPT

## 2019-06-07 ENCOUNTER — TELEPHONE (OUTPATIENT)
Dept: INTERNAL MEDICINE CLINIC | Facility: CLINIC | Age: 44
End: 2019-06-07

## 2019-06-07 DIAGNOSIS — E55.9 VITAMIN D DEFICIENCY: Primary | ICD-10-CM

## 2019-06-07 DIAGNOSIS — E04.1 THYROID NODULE: ICD-10-CM

## 2019-06-07 PROBLEM — R70.0 ELEVATED SED RATE: Status: ACTIVE | Noted: 2019-06-07

## 2019-06-07 PROBLEM — R79.82 ELEVATED C-REACTIVE PROTEIN (CRP): Status: ACTIVE | Noted: 2019-06-07

## 2019-06-07 NOTE — TELEPHONE ENCOUNTER
----- Message from [facility], [address] sent at 6/7/2019 11:05 AM EDT -----  Please call  Vit D level is low, iron is on low side of normal  Start Vit D 50,000 units weekly  Also sed rate and c-rp are still elevated  She will be seeing Dr Bennie Turner at the end of the month   thanks

## 2019-06-13 ENCOUNTER — HOSPITAL ENCOUNTER (OUTPATIENT)
Dept: ULTRASOUND IMAGING | Facility: HOSPITAL | Age: 44
Discharge: HOME | End: 2019-06-13
Admitting: RADIOLOGY
Payer: COMMERCIAL

## 2019-06-13 DIAGNOSIS — E04.1 THYROID NODULE: ICD-10-CM

## 2019-06-13 PROCEDURE — 88172 CYTP DX EVAL FNA 1ST EA SITE: CPT | Performed by: PATHOLOGY

## 2019-06-13 PROCEDURE — 88173 CYTOPATH EVAL FNA REPORT: CPT | Performed by: PATHOLOGY

## 2019-06-13 PROCEDURE — 10005 FNA BX W/US GDN 1ST LES: CPT

## 2019-06-14 ENCOUNTER — TELEPHONE (OUTPATIENT)
Dept: INTERNAL MEDICINE CLINIC | Facility: CLINIC | Age: 44
End: 2019-06-14

## 2019-06-14 DIAGNOSIS — E04.1 THYROID NODULE: Primary | ICD-10-CM

## 2019-07-09 ENCOUNTER — CLINICAL SUPPORT (OUTPATIENT)
Dept: NUTRITION | Facility: HOSPITAL | Age: 44
End: 2019-07-09
Payer: COMMERCIAL

## 2019-07-09 VITALS — HEIGHT: 66 IN | WEIGHT: 263 LBS | BODY MASS INDEX: 42.27 KG/M2

## 2019-07-09 DIAGNOSIS — E11.9 TYPE 2 DIABETES MELLITUS WITHOUT COMPLICATION, WITHOUT LONG-TERM CURRENT USE OF INSULIN (HCC): ICD-10-CM

## 2019-07-09 PROCEDURE — 97802 MEDICAL NUTRITION INDIV IN: CPT | Performed by: DIETITIAN, REGISTERED

## 2019-07-09 NOTE — PROGRESS NOTES
Initial Nutrition Assessment Form    Patient Name: Marshall Marti    YOB: 1975    Sex: Female     Assessment Date: 7/9/2019  Start Time: 9:10 Stop Time: 10:10 Total Minutes: 60     Data:  Present at session: self   Parent Concerns: n/a   Medical Dx/Reason for Referral: T2DM- E11 9   Past Medical History:   Diagnosis Date    Bell's palsy     Diabetes mellitus during pregnancy     Migraine        Current Outpatient Medications   Medication Sig Dispense Refill    albuterol (2 5 mg/3 mL) 0 083 % nebulizer solution Inhale      Cholecalciferol 52674 units capsule Take 1 cap po weekly 12 capsule 3    levothyroxine 25 mcg tablet Take 1 5 tabs po QAM, one hour prior to breakfast  45 tablet 2     No current facility-administered medications for this visit  Additional Meds/Supplements: none   Special Learning Needs: none   Height: 5'6"   Weight: Wt Readings from Last 3 Encounters:   07/09/19 119 kg (263 lb)   06/03/19 121 kg (267 lb 9 6 oz)   01/24/19 127 kg (280 lb)     Body mass index is 42 45 kg/m²  Recent Weight Change: [x]Yes     []No  Amount: Pt was 280# in Jan 2019, was trying to lose weight       Energy Needs: No calculations performed for this visit   Allergies   Allergen Reactions    Aspirin     Iodinated Diagnostic Agents        Social History     Substance and Sexual Activity   Alcohol Use Yes       Social History     Tobacco Use   Smoking Status Never Smoker   Smokeless Tobacco Never Used       Who shops? patient and spouse   Who cooks? patient and spouse   Exercise: Is able to be active with her family, walks around track while her daughter practices field hockey   Prior Counseling? []Yes     [x]No  When:    Why:         Diet Hx:  Breakfast: ham, egg and cheese sandwiches on english muffin, Nellie Bienvenido cereal with 2% milk, pancakes, oatmeal, smoothies, yogurt, cottage cheese and fruit 8 a m     Lunch: strawberries hummus, cheese and pretzel chip plate, cheeseburgers, fast food 2-3x per week 11:30-1P p m  Dinner: salmon, asparagus, roasted potatoes, hot dogs, baked beans, chinese food 5:30-7 p m  Snacks: does not snack often, if she does, will have string cheese and fruit or cut up veggies; mainly drinks water, hot tea or unsweetened iced tea         Nutrition Diagnosis:   Excess carbohydrate intake  related to Physiological causes requiring modified carbohydrate intake (i e  diabetes mellitus) as  evidenced by Hemoglobin A1C >6%       Medical Nutrition Therapy Intervention:  []Individualized Meal Plan [x]Understanding Lab Values   []Basic Pathophysiology of Disease []Food/Medication Interactions   []Food Diary [x]Exercise   [x]Lifestyle/Behavior Modification Techniques []Medication, Mechanism of Action   [x]Label Reading []Self Blood Glucose Monitoring   [x]Weight/BMI Goals []Other -    Other Notes: Darell Connell presents today to discuss her current intake and her medical conditions  She has Hasimotos and thyroid nodules  She is in the process of getting them biopsied  She was recently diagnosed with Hashimotos and has been researching certain diets for autoimmune diseases  Discussed these fad diets and recommending a whole foods, plant-based diet for optimal health,  Discussed lean proteins and ways to decrease red meat  She has been trying to increase chicken turkey and fish in her families diet since Hashimotos diagnosis  She has also been much more conscious of sugar content of foods and does not want to incorporate overly processed foods into her diet,  She has been decreasing her dairy consumption as well  She currently has an A1c of 6 7%  We discussed this number and reviewed CHO counting, label reading and portion control appropriate for diabetes  Her weight loss goal is 250# by this fall  Current weight today- 263#  LDL from May 2019- 104          Comprehension: []Excellent  []Very Good  [x]Good  []Fair   []Poor    Receptivity: []Excellent  [x]Very Good  []Good  []Fair []Poor    Expected Compliance: []Excellent  [x]Very Good  []Good  []Fair   []Poor        Goals:  1  Decrease A1c by next PCP visit in August 2019 to 6 0%  2  Increase exercise to at least 30 minutes per day, 4-5 days per week   3   Facilitate weight loss to 255lbs by next follow up on Sept 17, 2019       Next scheduled follow up- Sept 17, 2019    Labs:  [facility]  Lab Results   Component Value Date    K 4 0 05/20/2019     (H) 05/20/2019    CO2 25 05/20/2019    BUN 12 05/20/2019    CREATININE 0 79 05/20/2019    GLUF 146 (H) 05/20/2019    CALCIUM 8 5 05/20/2019    AST 12 05/20/2019    ALT 21 05/20/2019    ALKPHOS 96 05/20/2019    EGFR 92 05/20/2019       BMP  Lab Results   Component Value Date    CALCIUM 8 5 05/20/2019    K 4 0 05/20/2019    CO2 25 05/20/2019     (H) 05/20/2019    BUN 12 05/20/2019    CREATININE 0 79 05/20/2019       Lipids  No results found for: CHOL  Lab Results   Component Value Date    HDL 54 05/20/2019     Lab Results   Component Value Date    LDLCALC 104 (H) 05/20/2019     Lab Results   Component Value Date    TRIG 69 05/20/2019     No results found for: CHOLHDL    Hemoglobin A1C  Lab Results   Component Value Date    HGBA1C 6 7 (H) 05/20/2019       Fasting Glucose  Lab Results   Component Value Date    GLUF 146 (H) 05/20/2019       Insulin     Thyroid  No results found for: TSH, J5GZKDQ, G7CBZOW, THYROIDAB    Hepatic Function Panel  Lab Results   Component Value Date    ALT 21 05/20/2019    AST 12 05/20/2019    ALKPHOS 96 05/20/2019       Celiac Disease Antibody Panel  No results found for: ENDOMYSIAL IGA, GLIADIN IGA, GLIADIN IGG, IGA, TISSUE TRANSGLUT AB, TTG IGA   Iron  Lab Results   Component Value Date    IRON 52 06/06/2019    TIBC 443 06/06/2019    FERRITIN 6 (L) 06/06/2019       Vitamins  No results found for: VITAMIN B2   No results found for: NICOTINAMIDE, NICOTINIC ACID   No results found for: [facility]  Lab Results   Component Value Date    NSVHJBSA15 340 06/06/2019     No results found for: VITB5  No results found for: R9EOEVFD  No results found for: THYROGLB  No results found for: VITAMIN K   No results found for: 25-HYDROXY VIT D   No components found for: Tonja De Oliveira MS, RD, LDN  [address] [facility]  [email or web address]  [place]  [address] [id number]

## 2019-07-16 ENCOUNTER — HOSPITAL ENCOUNTER (OUTPATIENT)
Dept: ULTRASOUND IMAGING | Facility: HOSPITAL | Age: 44
Discharge: HOME | End: 2019-07-16
Payer: COMMERCIAL

## 2019-07-16 DIAGNOSIS — N92.6 ABNORMAL MENSES: ICD-10-CM

## 2019-07-16 DIAGNOSIS — R53.83 OTHER FATIGUE: ICD-10-CM

## 2019-07-16 PROCEDURE — 76830 TRANSVAGINAL US NON-OB: CPT

## 2019-07-16 PROCEDURE — 76856 US EXAM PELVIC COMPLETE: CPT

## 2019-07-19 DIAGNOSIS — D25.1 INTRAMURAL LEIOMYOMA OF UTERUS: Primary | ICD-10-CM

## 2019-07-22 ENCOUNTER — TELEPHONE (OUTPATIENT)
Dept: INTERNAL MEDICINE CLINIC | Facility: CLINIC | Age: 44
End: 2019-07-22

## 2019-09-06 ENCOUNTER — TRANSCRIBE ORDERS (OUTPATIENT)
Dept: LAB | Facility: CLINIC | Age: 44
End: 2019-09-06

## 2019-09-06 ENCOUNTER — APPOINTMENT (OUTPATIENT)
Dept: LAB | Facility: CLINIC | Age: 44
End: 2019-09-06
Payer: COMMERCIAL

## 2019-09-06 DIAGNOSIS — R53.83 OTHER FATIGUE: ICD-10-CM

## 2019-09-06 DIAGNOSIS — E06.3 HASHIMOTO'S THYROIDITIS: ICD-10-CM

## 2019-09-06 DIAGNOSIS — E11.9 TYPE 2 DIABETES MELLITUS WITHOUT COMPLICATION, WITHOUT LONG-TERM CURRENT USE OF INSULIN (HCC): ICD-10-CM

## 2019-09-06 DIAGNOSIS — E03.9 HYPOTHYROIDISM, UNSPECIFIED TYPE: ICD-10-CM

## 2019-09-06 DIAGNOSIS — E03.9 MYXEDEMA HEART DISEASE: Primary | ICD-10-CM

## 2019-09-06 DIAGNOSIS — I51.9 MYXEDEMA HEART DISEASE: Primary | ICD-10-CM

## 2019-09-06 LAB
BASOPHILS # BLD AUTO: 0.02 THOUSANDS/ΜL (ref 0–0.1)
BASOPHILS NFR BLD AUTO: 0 % (ref 0–1)
EOSINOPHIL # BLD AUTO: 0.19 THOUSAND/ΜL (ref 0–0.61)
EOSINOPHIL NFR BLD AUTO: 3 % (ref 0–6)
ERYTHROCYTE [DISTWIDTH] IN BLOOD BY AUTOMATED COUNT: 13.8 % (ref 11.6–15.1)
HCT VFR BLD AUTO: 34.7 % (ref 34.8–46.1)
HGB BLD-MCNC: 10.8 G/DL (ref 11.5–15.4)
IMM GRANULOCYTES # BLD AUTO: 0.01 THOUSAND/UL (ref 0–0.2)
IMM GRANULOCYTES NFR BLD AUTO: 0 % (ref 0–2)
LYMPHOCYTES # BLD AUTO: 1.71 THOUSANDS/ΜL (ref 0.6–4.47)
LYMPHOCYTES NFR BLD AUTO: 30 % (ref 14–44)
MCH RBC QN AUTO: 27.4 PG (ref 26.8–34.3)
MCHC RBC AUTO-ENTMCNC: 31.1 G/DL (ref 31.4–37.4)
MCV RBC AUTO: 88 FL (ref 82–98)
MONOCYTES # BLD AUTO: 0.41 THOUSAND/ΜL (ref 0.17–1.22)
MONOCYTES NFR BLD AUTO: 7 % (ref 4–12)
NEUTROPHILS # BLD AUTO: 3.33 THOUSANDS/ΜL (ref 1.85–7.62)
NEUTS SEG NFR BLD AUTO: 60 % (ref 43–75)
NRBC BLD AUTO-RTO: 0 /100 WBCS
PLATELET # BLD AUTO: 195 THOUSANDS/UL (ref 149–390)
PMV BLD AUTO: 13.3 FL (ref 8.9–12.7)
RBC # BLD AUTO: 3.94 MILLION/UL (ref 3.81–5.12)
TSH SERPL DL<=0.05 MIU/L-ACNC: 3.53 UIU/ML (ref 0.36–3.74)
WBC # BLD AUTO: 5.67 THOUSAND/UL (ref 4.31–10.16)

## 2019-09-06 PROCEDURE — 84443 ASSAY THYROID STIM HORMONE: CPT

## 2019-09-06 PROCEDURE — 36415 COLL VENOUS BLD VENIPUNCTURE: CPT

## 2019-09-06 PROCEDURE — 85025 COMPLETE CBC W/AUTO DIFF WBC: CPT

## 2019-09-12 ENCOUNTER — OFFICE VISIT (OUTPATIENT)
Dept: INTERNAL MEDICINE CLINIC | Facility: CLINIC | Age: 44
End: 2019-09-12
Payer: COMMERCIAL

## 2019-09-12 VITALS
SYSTOLIC BLOOD PRESSURE: 115 MMHG | RESPIRATION RATE: 16 BRPM | OXYGEN SATURATION: 98 % | BODY MASS INDEX: 41.98 KG/M2 | DIASTOLIC BLOOD PRESSURE: 60 MMHG | HEART RATE: 107 BPM | WEIGHT: 261.2 LBS | TEMPERATURE: 99.8 F | HEIGHT: 66 IN

## 2019-09-12 DIAGNOSIS — R70.0 ELEVATED SED RATE: ICD-10-CM

## 2019-09-12 DIAGNOSIS — M25.541 ARTHRALGIA OF BOTH HANDS: ICD-10-CM

## 2019-09-12 DIAGNOSIS — E04.1 THYROID NODULE: ICD-10-CM

## 2019-09-12 DIAGNOSIS — E06.3 HASHIMOTO'S THYROIDITIS: ICD-10-CM

## 2019-09-12 DIAGNOSIS — E55.9 VITAMIN D DEFICIENCY: ICD-10-CM

## 2019-09-12 DIAGNOSIS — Z82.61 FAMILY HISTORY OF RHEUMATOID ARTHRITIS: ICD-10-CM

## 2019-09-12 DIAGNOSIS — E66.01 OBESITY, MORBID (HCC): ICD-10-CM

## 2019-09-12 DIAGNOSIS — M25.542 ARTHRALGIA OF BOTH HANDS: ICD-10-CM

## 2019-09-12 DIAGNOSIS — Z84.0 FAMILY HISTORY OF LUPUS ERYTHEMATOSUS: ICD-10-CM

## 2019-09-12 DIAGNOSIS — R89.9 ABNORMAL THYROID BIOPSY: ICD-10-CM

## 2019-09-12 DIAGNOSIS — D50.0 IRON DEFICIENCY ANEMIA DUE TO CHRONIC BLOOD LOSS: ICD-10-CM

## 2019-09-12 DIAGNOSIS — E66.01 MORBID OBESITY WITH BMI OF 40.0-44.9, ADULT (HCC): ICD-10-CM

## 2019-09-12 DIAGNOSIS — Z23 NEED FOR IMMUNIZATION AGAINST INFLUENZA: Primary | ICD-10-CM

## 2019-09-12 LAB — SL AMB POCT HEMOGLOBIN AIC: 6.1 (ref ?–6.5)

## 2019-09-12 PROCEDURE — 99215 OFFICE O/P EST HI 40 MIN: CPT

## 2019-09-12 PROCEDURE — 90686 IIV4 VACC NO PRSV 0.5 ML IM: CPT

## 2019-09-12 PROCEDURE — 3044F HG A1C LEVEL LT 7.0%: CPT

## 2019-09-12 PROCEDURE — 3008F BODY MASS INDEX DOCD: CPT

## 2019-09-12 PROCEDURE — 90471 IMMUNIZATION ADMIN: CPT

## 2019-09-12 PROCEDURE — 83036 HEMOGLOBIN GLYCOSYLATED A1C: CPT

## 2019-09-12 RX ORDER — FERROUS SULFATE TAB EC 324 MG (65 MG FE EQUIVALENT) 324 (65 FE) MG
TABLET DELAYED RESPONSE ORAL
Qty: 100 TABLET | Refills: 2 | Status: SHIPPED | OUTPATIENT
Start: 2019-09-12

## 2019-09-12 RX ORDER — MULTIVIT WITH MINERALS/LUTEIN
TABLET ORAL
Qty: 100 TABLET | Refills: 2 | Status: SHIPPED | OUTPATIENT
Start: 2019-09-12 | End: 2020-12-28 | Stop reason: ALTCHOICE

## 2019-09-12 NOTE — LETTER
September 12, 2019     Michele Gamez MD  [address] [place] [address]    Patient: Keke Mckenna   YOB: 1975   Date of Visit: 9/12/2019       Dear Dr Khanh Constantino:    Thank you for referring Nathaly Paul to me for evaluation  Below are my notes for this consultation  If you have questions, please do not hesitate to call me  I look forward to following your patient along with you  Sincerely,        Dana Luis DO        CC: No Recipients  Dana Luis DO  9/12/2019  6:05 PM  Sign at close encounter  Assessment/Plan:  Repeat thyroid nodule bx next week had inconclusive bx prior  1 patient with some atypia on biopsy will refer to Endocrinology for further evaluation along with her Hashimoto's thyroiditis  2  Patient with diffuse joint pain will do laboratory work as well as ultrasound of the joints due to rule out any inflammatory arthritis may consider referral to Rheumatology  3  Patient with iron deficiency anemia secondary to heavy periods has had gyn evaluation will be taking iron with vitamin-C twice a day with meals if she develops constipation to stop the iron as this can cause constipation and then restarted with the constipation has resolved with a stool softener  4  Obesity will try naltrexone bupropion to try and lose 3-5 lb per month   5  Hashimoto's thyroid it is TSH is at goal will recheck in 2 months  6  Vitamin-D deficiency on [address] units of vitamin-D 2 a day will recheck in 2 months  7  Type 2 diabetes A1c is at goal will recheck in 2 months          Diagnoses and all orders for this visit:    Need for immunization against influenza  -     influenza vaccine, [id number], quadrivalent, 0 5 mL, preservative-free, for adult and pediatric patients 6 mos+ (AFLURIA, FLUARIX, FLULAVAL, FLUZONE)  -     POCT hemoglobin A1c  -     Microalbumin / creatinine urine ratio  -     Sjogren's Antibodies; Future  -     Sedimentation rate, automated;  Future  - C-reactive protein; Future  -     C4 complement; Future  -     C3 complement; Future  -     Angiotensin converting enzyme; Future  -     Anaplasma Phagocytophilum, PCR; Future  -     RAYNE Screen w/ Reflex to Titer/Pattern; Future  -     Cyclic citrul peptide antibody, IgG; Future  -     RF Screen w/ Reflex to Titer; Future  -     Uric acid; Future  -     Celiac Disease Antibody Profile; Future  -     Diagnostic ultrasound of joints; Future  -     T3, free; Future  -     T4, free; Future  -     TSH, 3rd generation; Future  -     Vitamin D 25 hydroxy; Future  -     Iron Panel (Includes Ferritin, Iron Sat%, Iron, and TIBC); Future  -     Ferritin; Future  -     CBC and differential; Future  -     Hemoglobin A1C; Future    Morbid obesity with BMI of 40 0-44 9, adult (HCC)  -     Naltrexone-buPROPion HCl ER 8-90 MG TB12; 1 tab daily x 7 days, 1 tab twice daily x 7 days, 2 tabs in AM and 1 tab in PM x 7 days, then 2 tabs twice daily  -     POCT hemoglobin A1c  -     Microalbumin / creatinine urine ratio  -     Sjogren's Antibodies; Future  -     Sedimentation rate, automated; Future  -     C-reactive protein; Future  -     C4 complement; Future  -     C3 complement; Future  -     Angiotensin converting enzyme; Future  -     Anaplasma Phagocytophilum, PCR; Future  -     RAYNE Screen w/ Reflex to Titer/Pattern; Future  -     Cyclic citrul peptide antibody, IgG; Future  -     RF Screen w/ Reflex to Titer; Future  -     Uric acid; Future  -     Celiac Disease Antibody Profile; Future  -     Diagnostic ultrasound of joints; Future  -     T3, free; Future  -     T4, free; Future  -     TSH, 3rd generation; Future  -     Vitamin D 25 hydroxy; Future  -     Iron Panel (Includes Ferritin, Iron Sat%, Iron, and TIBC); Future  -     Ferritin;  Future  -     CBC and differential; Future  -     Hemoglobin A1C; Future    Iron deficiency anemia due to chronic blood loss  -     POCT hemoglobin A1c  -     Microalbumin / creatinine urine ratio  -     ferrous sulfate 324 (65 Fe) mg; Take 1 twice a day with vitamin-C  -     ascorbic acid (VITAMIN C) 250 mg tablet; Take 1 twice a day with iron  -     Sjogren's Antibodies; Future  -     Sedimentation rate, automated; Future  -     C-reactive protein; Future  -     C4 complement; Future  -     C3 complement; Future  -     Angiotensin converting enzyme; Future  -     Anaplasma Phagocytophilum, PCR; Future  -     RAYNE Screen w/ Reflex to Titer/Pattern; Future  -     Cyclic citrul peptide antibody, IgG; Future  -     RF Screen w/ Reflex to Titer; Future  -     Uric acid; Future  -     Celiac Disease Antibody Profile; Future  -     Diagnostic ultrasound of joints; Future  -     T3, free; Future  -     T4, free; Future  -     TSH, 3rd generation; Future  -     Vitamin D 25 hydroxy; Future  -     Iron Panel (Includes Ferritin, Iron Sat%, Iron, and TIBC); Future  -     Ferritin; Future  -     CBC and differential; Future  -     Hemoglobin A1C; Future    Family history of lupus erythematosus  -     POCT hemoglobin A1c  -     Microalbumin / creatinine urine ratio  -     Sjogren's Antibodies; Future  -     Sedimentation rate, automated; Future  -     C-reactive protein; Future  -     C4 complement; Future  -     C3 complement; Future  -     Angiotensin converting enzyme; Future  -     Anaplasma Phagocytophilum, PCR; Future  -     RAYNE Screen w/ Reflex to Titer/Pattern; Future  -     Cyclic citrul peptide antibody, IgG; Future  -     RF Screen w/ Reflex to Titer; Future  -     Uric acid; Future  -     Celiac Disease Antibody Profile; Future  -     Diagnostic ultrasound of joints; Future  -     T3, free; Future  -     T4, free; Future  -     TSH, 3rd generation; Future  -     Vitamin D 25 hydroxy; Future  -     Iron Panel (Includes Ferritin, Iron Sat%, Iron, and TIBC); Future  -     Ferritin;  Future  -     CBC and differential; Future  -     Hemoglobin A1C; Future    Family history of rheumatoid arthritis  -     POCT hemoglobin A1c  -     Microalbumin / creatinine urine ratio  -     Sjogren's Antibodies; Future  -     Sedimentation rate, automated; Future  -     C-reactive protein; Future  -     C4 complement; Future  -     C3 complement; Future  -     Angiotensin converting enzyme; Future  -     Anaplasma Phagocytophilum, PCR; Future  -     RAYNE Screen w/ Reflex to Titer/Pattern; Future  -     Cyclic citrul peptide antibody, IgG; Future  -     RF Screen w/ Reflex to Titer; Future  -     Uric acid; Future  -     Celiac Disease Antibody Profile; Future  -     Diagnostic ultrasound of joints; Future  -     T3, free; Future  -     T4, free; Future  -     TSH, 3rd generation; Future  -     Vitamin D 25 hydroxy; Future  -     Iron Panel (Includes Ferritin, Iron Sat%, Iron, and TIBC); Future  -     Ferritin; Future  -     CBC and differential; Future  -     Hemoglobin A1C; Future    Arthralgia of both hands  -     POCT hemoglobin A1c  -     Microalbumin / creatinine urine ratio  -     Sjogren's Antibodies; Future  -     Sedimentation rate, automated; Future  -     C-reactive protein; Future  -     C4 complement; Future  -     C3 complement; Future  -     Angiotensin converting enzyme; Future  -     Anaplasma Phagocytophilum, PCR; Future  -     RAYNE Screen w/ Reflex to Titer/Pattern; Future  -     Cyclic citrul peptide antibody, IgG; Future  -     RF Screen w/ Reflex to Titer; Future  -     Uric acid; Future  -     Celiac Disease Antibody Profile; Future  -     Diagnostic ultrasound of joints; Future  -     T3, free; Future  -     T4, free; Future  -     TSH, 3rd generation; Future  -     Vitamin D 25 hydroxy; Future  -     Iron Panel (Includes Ferritin, Iron Sat%, Iron, and TIBC); Future  -     Ferritin; Future  -     CBC and differential; Future  -     Hemoglobin A1C; Future    Obesity, morbid (HCC)  -     POCT hemoglobin A1c  -     Microalbumin / creatinine urine ratio  -     Sjogren's Antibodies;  Future  -     Sedimentation rate, automated; Future  -     C-reactive protein; Future  -     C4 complement; Future  -     C3 complement; Future  -     Angiotensin converting enzyme; Future  -     Anaplasma Phagocytophilum, PCR; Future  -     RAYNE Screen w/ Reflex to Titer/Pattern; Future  -     Cyclic citrul peptide antibody, IgG; Future  -     RF Screen w/ Reflex to Titer; Future  -     Uric acid; Future  -     Celiac Disease Antibody Profile; Future  -     Diagnostic ultrasound of joints; Future  -     T3, free; Future  -     T4, free; Future  -     TSH, 3rd generation; Future  -     Vitamin D 25 hydroxy; Future  -     Iron Panel (Includes Ferritin, Iron Sat%, Iron, and TIBC); Future  -     Ferritin; Future  -     CBC and differential; Future  -     Hemoglobin A1C; Future    Elevated sed rate  -     POCT hemoglobin A1c  -     Microalbumin / creatinine urine ratio  -     Sjogren's Antibodies; Future  -     Sedimentation rate, automated; Future  -     C-reactive protein; Future  -     C4 complement; Future  -     C3 complement; Future  -     Angiotensin converting enzyme; Future  -     Anaplasma Phagocytophilum, PCR; Future  -     RAYNE Screen w/ Reflex to Titer/Pattern; Future  -     Cyclic citrul peptide antibody, IgG; Future  -     RF Screen w/ Reflex to Titer; Future  -     Uric acid; Future  -     Celiac Disease Antibody Profile; Future  -     Diagnostic ultrasound of joints; Future  -     T3, free; Future  -     T4, free; Future  -     TSH, 3rd generation; Future  -     Vitamin D 25 hydroxy; Future  -     Iron Panel (Includes Ferritin, Iron Sat%, Iron, and TIBC); Future  -     Ferritin; Future  -     CBC and differential; Future  -     Hemoglobin A1C; Future    Thyroid nodule  -     POCT hemoglobin A1c  -     Microalbumin / creatinine urine ratio  -     Sjogren's Antibodies; Future  -     Sedimentation rate, automated; Future  -     C-reactive protein; Future  -     C4 complement; Future  -     C3 complement;  Future  -     Angiotensin converting enzyme; Future  -     Anaplasma Phagocytophilum, PCR; Future  -     RAYNE Screen w/ Reflex to Titer/Pattern; Future  -     Cyclic citrul peptide antibody, IgG; Future  -     RF Screen w/ Reflex to Titer; Future  -     Uric acid; Future  -     Celiac Disease Antibody Profile; Future  -     Diagnostic ultrasound of joints; Future  -     T3, free; Future  -     T4, free; Future  -     TSH, 3rd generation; Future  -     Vitamin D 25 hydroxy; Future  -     Iron Panel (Includes Ferritin, Iron Sat%, Iron, and TIBC); Future  -     Ferritin; Future  -     CBC and differential; Future  -     Hemoglobin A1C; Future    Hashimoto's thyroiditis  -     POCT hemoglobin A1c  -     Microalbumin / creatinine urine ratio  -     Sjogren's Antibodies; Future  -     Sedimentation rate, automated; Future  -     C-reactive protein; Future  -     C4 complement; Future  -     C3 complement; Future  -     Angiotensin converting enzyme; Future  -     Anaplasma Phagocytophilum, PCR; Future  -     RAYNE Screen w/ Reflex to Titer/Pattern; Future  -     Cyclic citrul peptide antibody, IgG; Future  -     RF Screen w/ Reflex to Titer; Future  -     Uric acid; Future  -     Celiac Disease Antibody Profile; Future  -     Diagnostic ultrasound of joints; Future  -     T3, free; Future  -     T4, free; Future  -     TSH, 3rd generation; Future  -     Vitamin D 25 hydroxy; Future  -     Iron Panel (Includes Ferritin, Iron Sat%, Iron, and TIBC); Future  -     Ferritin; Future  -     Ambulatory referral to Endocrinology; Future  -     CBC and differential; Future  -     Hemoglobin A1C; Future    Vitamin D deficiency  -     POCT hemoglobin A1c  -     Microalbumin / creatinine urine ratio  -     Sjogren's Antibodies; Future  -     Sedimentation rate, automated; Future  -     C-reactive protein; Future  -     C4 complement; Future  -     C3 complement; Future  -     Angiotensin converting enzyme;  Future  -     Anaplasma Phagocytophilum, PCR; Future  -     RAYNE Screen w/ Reflex to Titer/Pattern; Future  -     Cyclic citrul peptide antibody, IgG; Future  -     RF Screen w/ Reflex to Titer; Future  -     Uric acid; Future  -     Celiac Disease Antibody Profile; Future  -     Diagnostic ultrasound of joints; Future  -     T3, free; Future  -     T4, free; Future  -     TSH, 3rd generation; Future  -     Vitamin D 25 hydroxy; Future  -     Iron Panel (Includes Ferritin, Iron Sat%, Iron, and TIBC); Future  -     Ferritin; Future  -     CBC and differential; Future  -     Hemoglobin A1C; Future     Atypia of undetermined significance ([place] Category   -     POCT hemoglobin A1c  -     Microalbumin / creatinine urine ratio  -     Sjogren's Antibodies; Future  -     Sedimentation rate, automated; Future  -     C-reactive protein; Future  -     C4 complement; Future  -     C3 complement; Future  -     Angiotensin converting enzyme; Future  -     Anaplasma Phagocytophilum, PCR; Future  -     RAYNE Screen w/ Reflex to Titer/Pattern; Future  -     Cyclic citrul peptide antibody, IgG; Future  -     RF Screen w/ Reflex to Titer; Future  -     Uric acid; Future  -     Celiac Disease Antibody Profile; Future  -     Diagnostic ultrasound of joints; Future  -     T3, free; Future  -     T4, free; Future  -     TSH, 3rd generation; Future  -     Vitamin D 25 hydroxy; Future  -     Iron Panel (Includes Ferritin, Iron Sat%, Iron, and TIBC); Future  -     Ferritin; Future  -     Ambulatory referral to Endocrinology; Future  -     CBC and differential; Future  -     Hemoglobin A1C; Future        The patient was counseled regarding instructions for management, risk factor reductions, patient and family education,impressions, risks and benefits of treatment options, side effects of medications, importance of compliance with treatment  The treatment plan was reviewed with the patient/guardian and patient/guardian understands and agrees with the treatment plan              Current Outpatient Medications:     albuterol (2 5 mg/3 mL) 0 083 % nebulizer solution, Inhale, Disp: , Rfl:     Cholecalciferol 06616 units capsule, Take 1 cap po weekly, Disp: 12 capsule, Rfl: 3    levothyroxine 25 mcg tablet, Take 1 5 tabs po QAM, one hour prior to breakfast , Disp: 45 tablet, Rfl: 2    ascorbic acid (VITAMIN C) 250 mg tablet, Take 1 twice a day with iron, Disp: 100 tablet, Rfl: 2    ferrous sulfate 324 (65 Fe) mg, Take 1 twice a day with vitamin-C, Disp: 100 tablet, Rfl: 2    Naltrexone-buPROPion HCl ER 8-90 MG TB12, 1 tab daily x 7 days, 1 tab twice daily x 7 days, 2 tabs in AM and 1 tab in PM x 7 days, then 2 tabs twice daily, Disp: 120 tablet, Rfl: 0    Subjective:      Patient ID: Faye President is a 38 y o  female  Pain all joint constants, several months      The following portions of the patient's history were reviewed and updated as appropriate:   She has a past medical history of Bell's palsy, Diabetes mellitus during pregnancy, and Migraine  ,  does not have any pertinent problems on file  ,   has a past surgical history that includes  section; Hysteroscopy; and US guided thyroid biopsy (2019)  ,  family history includes Cervical cancer in her maternal aunt; Heart disease in her father; Hyperlipidemia in her father; Hypertension in her father; Migraines in her mother  ,   reports that she has never smoked  She has never used smokeless tobacco  She reports that she drinks alcohol  She reports that she does not use drugs  ,  is allergic to aspirin and iodinated diagnostic agents       Review of Systems   Constitutional: Negative for appetite change, chills, fatigue, fever and unexpected weight change  HENT: Negative for congestion, ear pain, facial swelling, hearing loss, mouth sores, nosebleeds, postnasal drip, rhinorrhea, sinus pain, sore throat, trouble swallowing and voice change  Eyes: Negative for pain, discharge, redness and visual disturbance     Respiratory: Negative for apnea, chest tightness, shortness of breath, wheezing and stridor  Cardiovascular: Negative for chest pain, palpitations and leg swelling  Gastrointestinal: Negative for abdominal distention, abdominal pain, blood in stool, constipation, diarrhea and vomiting  Endocrine: Negative for cold intolerance, heat intolerance, polydipsia, polyphagia and polyuria  Genitourinary: Negative for difficulty urinating, dysuria, flank pain, frequency, genital sores, hematuria and urgency  Musculoskeletal: Positive for arthralgias  Negative for back pain  Skin: Negative for rash and wound  Allergic/Immunologic: Negative for environmental allergies, food allergies and immunocompromised state  Neurological: Negative for dizziness, tremors, seizures, syncope, facial asymmetry, speech difficulty, weakness, light-headedness, numbness and headaches  Hematological: Negative for adenopathy  Does not bruise/bleed easily  Psychiatric/Behavioral: Negative for agitation, behavioral problems, dysphoric mood, hallucinations, self-injury, sleep disturbance and suicidal ideas  The patient is not hyperactive            Objective:  /60 (BP Location: Left arm, Patient Position: Sitting)   Pulse (!) 107   Temp 99 8 °F (37 7 °C) (Tympanic)   Resp 16   Ht 5' 6" (1 676 m)   Wt 118 kg (261 lb 3 2 oz)   SpO2 98%   BMI 42 16 kg/m²      Lab Review  Transcribe Orders on 09/06/2019   Component Date Value    TSH 3RD GENERATON 09/06/2019 3 530     WBC 09/06/2019 5 67     RBC 09/06/2019 3 94     Hemoglobin 09/06/2019 10 8*    Hematocrit 09/06/2019 34 7*    MCV 09/06/2019 88     MCH 09/06/2019 27 4     MCHC 09/06/2019 31 1*    RDW 09/06/2019 13 8     MPV 09/06/2019 13 3*    Platelets 02/79/7123 195     nRBC 09/06/2019 0     Neutrophils Relative 09/06/2019 60     Immat GRANS % 09/06/2019 0     Lymphocytes Relative 09/06/2019 30     Monocytes Relative 09/06/2019 7     Eosinophils Relative 09/06/2019 3     Basophils Relative 09/06/2019 0     Neutrophils Absolute 09/06/2019 3 33     Immature Grans Absolute 09/06/2019 0 01     Lymphocytes Absolute 09/06/2019 1 71     Monocytes Absolute 09/06/2019 0 41     Eosinophils Absolute 09/06/2019 0 19     Basophils Absolute 09/06/2019 0 02    Hospital Outpatient Visit on 06/13/2019   Component Date Value    Case Report 06/13/2019                      Value:Non-gynecologic Cytology                          Case: [id number]                                  Authorizing Provider:  NAMITA Daniel          Collected:           06/13/2019 5001              Ordering Location:     [address] [place] [address] Received:            06/13/2019 0957                                     Ultrasound                                                                   Pathologist:           Whit Holt MD                                                                Specimens:   A) - Thyroid, Left, mid pole                                                                        B) - Thyroid, Left, mid pole                                                               Final Diagnosis 06/13/2019                      Value: This result contains rich text formatting which cannot be displayed here   Note 06/13/2019                      Value: This result contains rich text formatting which cannot be displayed here   Intraoperative Consultat* 06/13/2019                      Value: This result contains rich text formatting which cannot be displayed here  [facility] Description 06/13/2019                      Value: This result contains rich text formatting which cannot be displayed here   Clinical Information 06/13/2019                      Value:Size: 1 8x1 0x1 2cm Margins: SMOOTH Echogenicity: SOLID Microcalcs: N/A Flow: N/A Size change: N/A Suspicion level: INTERMEDIATE Hx of Hashimoto's Thyroiditis: N/A    Additional Information 06/13/2019                      Value: This result contains rich text formatting which cannot be displayed here  Appointment on 06/06/2019   Component Date Value    Vit D, 25-Hydroxy 06/06/2019 13 2*    Vitamin B-12 06/06/2019 340     Iron 06/06/2019 52     TIBC 06/06/2019 443     Ferritin 06/06/2019 6*   Transcribe Orders on 05/20/2019   Component Date Value    LDL Direct 05/20/2019 101*    Cholesterol 05/20/2019 172     Triglycerides 05/20/2019 69     HDL, Direct 05/20/2019 54     LDL Calculated 05/20/2019 104*    Creatinine, Ur 05/20/2019 112 0     Microalbum  ,U,Random 05/20/2019 8 2     Microalb Creat Ratio 05/20/2019 7     T3, Free 05/20/2019 2 28*    Free T4 05/20/2019 0 94     TSH 3RD GENERATON 05/20/2019 4 200*    THYROID MICROSOMAL ANTIB* 05/20/2019 251*    Thyroglobulin Ab 05/20/2019 <1 0     Sodium 05/20/2019 138     Potassium 05/20/2019 4 0     Chloride 05/20/2019 109*    CO2 05/20/2019 25     ANION GAP 05/20/2019 4     BUN 05/20/2019 12     Creatinine 05/20/2019 0 79     Glucose, Fasting 05/20/2019 146*    Calcium 05/20/2019 8 5     AST 05/20/2019 12     ALT 05/20/2019 21     Alkaline Phosphatase 05/20/2019 96     Total Protein 05/20/2019 7 2     Albumin 05/20/2019 3 4*    Total Bilirubin 05/20/2019 0 50     eGFR 05/20/2019 92     Hemoglobin A1C 05/20/2019 6 7*    EAG 05/20/2019 146     Thyroglobulin-HAMLET 05/20/2019 65 9*         Imaging  [email or web address]@     No orders to display     No results found for this or any previous visit  Physical Exam        BMI Counseling: Body mass index is 42 16 kg/m²  The BMI is above normal  Nutrition recommendations include reducing portion sizes, decreasing overall calorie intake, 3-5 servings of fruits/vegetables daily, reducing fast food intake, consuming healthier snacks and decreasing soda and/or juice intake  Exercise recommendations include moderate aerobic physical activity for 150 minutes/week and exercising 3-5 times per week   Pharmacotherapy was ordered for patient to aid in weight loss

## 2019-09-12 NOTE — PROGRESS NOTES
Assessment/Plan:  Repeat thyroid nodule bx next week had inconclusive bx prior  1 patient with some atypia on biopsy will refer to Endocrinology for further evaluation along with her Hashimoto's thyroiditis  2  Patient with diffuse joint pain will do laboratory work as well as ultrasound of the joints due to rule out any inflammatory arthritis may consider referral to Rheumatology  3  Patient with iron deficiency anemia secondary to heavy periods has had gyn evaluation will be taking iron with vitamin-C twice a day with meals if she develops constipation to stop the iron as this can cause constipation and then restarted with the constipation has resolved with a stool softener  4  Obesity will try naltrexone bupropion to try and lose 3-5 lb per month   5  Hashimoto's thyroid it is TSH is at goal will recheck in 2 months  6  Vitamin-D deficiency on 23482 units of vitamin-D 2 a day will recheck in 2 months  7  Type 2 diabetes A1c is at goal will recheck in 2 months          Diagnoses and all orders for this visit:    Need for immunization against influenza  -     influenza vaccine, [id number], quadrivalent, 0 5 mL, preservative-free, for adult and pediatric patients 6 mos+ (AFLURIA, FLUARIX, FLULAVAL, FLUZONE)  -     POCT hemoglobin A1c  -     Microalbumin / creatinine urine ratio  -     Sjogren's Antibodies; Future  -     Sedimentation rate, automated; Future  -     C-reactive protein; Future  -     C4 complement; Future  -     C3 complement; Future  -     Angiotensin converting enzyme; Future  -     Anaplasma Phagocytophilum, PCR; Future  -     RAYNE Screen w/ Reflex to Titer/Pattern; Future  -     Cyclic citrul peptide antibody, IgG; Future  -     RF Screen w/ Reflex to Titer; Future  -     Uric acid; Future  -     Celiac Disease Antibody Profile; Future  -     Diagnostic ultrasound of joints; Future  -     T3, free; Future  -     T4, free; Future  -     TSH, 3rd generation;  Future  -     Vitamin D 25 hydroxy; Future  -     Iron Panel (Includes Ferritin, Iron Sat%, Iron, and TIBC); Future  -     Ferritin; Future  -     CBC and differential; Future  -     Hemoglobin A1C; Future    Morbid obesity with BMI of 40 0-44 9, adult (HCC)  -     Naltrexone-buPROPion HCl ER 8-90 MG TB12; 1 tab daily x 7 days, 1 tab twice daily x 7 days, 2 tabs in AM and 1 tab in PM x 7 days, then 2 tabs twice daily  -     POCT hemoglobin A1c  -     Microalbumin / creatinine urine ratio  -     Sjogren's Antibodies; Future  -     Sedimentation rate, automated; Future  -     C-reactive protein; Future  -     C4 complement; Future  -     C3 complement; Future  -     Angiotensin converting enzyme; Future  -     Anaplasma Phagocytophilum, PCR; Future  -     RAYNE Screen w/ Reflex to Titer/Pattern; Future  -     Cyclic citrul peptide antibody, IgG; Future  -     RF Screen w/ Reflex to Titer; Future  -     Uric acid; Future  -     Celiac Disease Antibody Profile; Future  -     Diagnostic ultrasound of joints; Future  -     T3, free; Future  -     T4, free; Future  -     TSH, 3rd generation; Future  -     Vitamin D 25 hydroxy; Future  -     Iron Panel (Includes Ferritin, Iron Sat%, Iron, and TIBC); Future  -     Ferritin; Future  -     CBC and differential; Future  -     Hemoglobin A1C; Future    Iron deficiency anemia due to chronic blood loss  -     POCT hemoglobin A1c  -     Microalbumin / creatinine urine ratio  -     ferrous sulfate 324 (65 Fe) mg; Take 1 twice a day with vitamin-C  -     ascorbic acid (VITAMIN C) 250 mg tablet; Take 1 twice a day with iron  -     Sjogren's Antibodies; Future  -     Sedimentation rate, automated; Future  -     C-reactive protein; Future  -     C4 complement; Future  -     C3 complement; Future  -     Angiotensin converting enzyme; Future  -     Anaplasma Phagocytophilum, PCR; Future  -     RAYNE Screen w/ Reflex to Titer/Pattern; Future  -     Cyclic citrul peptide antibody, IgG;  Future  -     RF Screen w/ Reflex to Titer; Future  -     Uric acid; Future  -     Celiac Disease Antibody Profile; Future  -     Diagnostic ultrasound of joints; Future  -     T3, free; Future  -     T4, free; Future  -     TSH, 3rd generation; Future  -     Vitamin D 25 hydroxy; Future  -     Iron Panel (Includes Ferritin, Iron Sat%, Iron, and TIBC); Future  -     Ferritin; Future  -     CBC and differential; Future  -     Hemoglobin A1C; Future    Family history of lupus erythematosus  -     POCT hemoglobin A1c  -     Microalbumin / creatinine urine ratio  -     Sjogren's Antibodies; Future  -     Sedimentation rate, automated; Future  -     C-reactive protein; Future  -     C4 complement; Future  -     C3 complement; Future  -     Angiotensin converting enzyme; Future  -     Anaplasma Phagocytophilum, PCR; Future  -     RAYNE Screen w/ Reflex to Titer/Pattern; Future  -     Cyclic citrul peptide antibody, IgG; Future  -     RF Screen w/ Reflex to Titer; Future  -     Uric acid; Future  -     Celiac Disease Antibody Profile; Future  -     Diagnostic ultrasound of joints; Future  -     T3, free; Future  -     T4, free; Future  -     TSH, 3rd generation; Future  -     Vitamin D 25 hydroxy; Future  -     Iron Panel (Includes Ferritin, Iron Sat%, Iron, and TIBC); Future  -     Ferritin; Future  -     CBC and differential; Future  -     Hemoglobin A1C; Future    Family history of rheumatoid arthritis  -     POCT hemoglobin A1c  -     Microalbumin / creatinine urine ratio  -     Sjogren's Antibodies; Future  -     Sedimentation rate, automated; Future  -     C-reactive protein; Future  -     C4 complement; Future  -     C3 complement; Future  -     Angiotensin converting enzyme; Future  -     Anaplasma Phagocytophilum, PCR; Future  -     RAYNE Screen w/ Reflex to Titer/Pattern; Future  -     Cyclic citrul peptide antibody, IgG; Future  -     RF Screen w/ Reflex to Titer; Future  -     Uric acid; Future  -     Celiac Disease Antibody Profile;  Future  -     Diagnostic ultrasound of joints; Future  -     T3, free; Future  -     T4, free; Future  -     TSH, 3rd generation; Future  -     Vitamin D 25 hydroxy; Future  -     Iron Panel (Includes Ferritin, Iron Sat%, Iron, and TIBC); Future  -     Ferritin; Future  -     CBC and differential; Future  -     Hemoglobin A1C; Future    Arthralgia of both hands  -     POCT hemoglobin A1c  -     Microalbumin / creatinine urine ratio  -     Sjogren's Antibodies; Future  -     Sedimentation rate, automated; Future  -     C-reactive protein; Future  -     C4 complement; Future  -     C3 complement; Future  -     Angiotensin converting enzyme; Future  -     Anaplasma Phagocytophilum, PCR; Future  -     RAYNE Screen w/ Reflex to Titer/Pattern; Future  -     Cyclic citrul peptide antibody, IgG; Future  -     RF Screen w/ Reflex to Titer; Future  -     Uric acid; Future  -     Celiac Disease Antibody Profile; Future  -     Diagnostic ultrasound of joints; Future  -     T3, free; Future  -     T4, free; Future  -     TSH, 3rd generation; Future  -     Vitamin D 25 hydroxy; Future  -     Iron Panel (Includes Ferritin, Iron Sat%, Iron, and TIBC); Future  -     Ferritin; Future  -     CBC and differential; Future  -     Hemoglobin A1C; Future    Obesity, morbid (HCC)  -     POCT hemoglobin A1c  -     Microalbumin / creatinine urine ratio  -     Sjogren's Antibodies; Future  -     Sedimentation rate, automated; Future  -     C-reactive protein; Future  -     C4 complement; Future  -     C3 complement; Future  -     Angiotensin converting enzyme; Future  -     Anaplasma Phagocytophilum, PCR; Future  -     RAYNE Screen w/ Reflex to Titer/Pattern; Future  -     Cyclic citrul peptide antibody, IgG; Future  -     RF Screen w/ Reflex to Titer; Future  -     Uric acid; Future  -     Celiac Disease Antibody Profile; Future  -     Diagnostic ultrasound of joints; Future  -     T3, free; Future  -     T4, free; Future  -     TSH, 3rd generation;  Future  -     Vitamin D 25 hydroxy; Future  -     Iron Panel (Includes Ferritin, Iron Sat%, Iron, and TIBC); Future  -     Ferritin; Future  -     CBC and differential; Future  -     Hemoglobin A1C; Future    Elevated sed rate  -     POCT hemoglobin A1c  -     Microalbumin / creatinine urine ratio  -     Sjogren's Antibodies; Future  -     Sedimentation rate, automated; Future  -     C-reactive protein; Future  -     C4 complement; Future  -     C3 complement; Future  -     Angiotensin converting enzyme; Future  -     Anaplasma Phagocytophilum, PCR; Future  -     RAYNE Screen w/ Reflex to Titer/Pattern; Future  -     Cyclic citrul peptide antibody, IgG; Future  -     RF Screen w/ Reflex to Titer; Future  -     Uric acid; Future  -     Celiac Disease Antibody Profile; Future  -     Diagnostic ultrasound of joints; Future  -     T3, free; Future  -     T4, free; Future  -     TSH, 3rd generation; Future  -     Vitamin D 25 hydroxy; Future  -     Iron Panel (Includes Ferritin, Iron Sat%, Iron, and TIBC); Future  -     Ferritin; Future  -     CBC and differential; Future  -     Hemoglobin A1C; Future    Thyroid nodule  -     POCT hemoglobin A1c  -     Microalbumin / creatinine urine ratio  -     Sjogren's Antibodies; Future  -     Sedimentation rate, automated; Future  -     C-reactive protein; Future  -     C4 complement; Future  -     C3 complement; Future  -     Angiotensin converting enzyme; Future  -     Anaplasma Phagocytophilum, PCR; Future  -     RAYNE Screen w/ Reflex to Titer/Pattern; Future  -     Cyclic citrul peptide antibody, IgG; Future  -     RF Screen w/ Reflex to Titer; Future  -     Uric acid; Future  -     Celiac Disease Antibody Profile; Future  -     Diagnostic ultrasound of joints; Future  -     T3, free; Future  -     T4, free; Future  -     TSH, 3rd generation; Future  -     Vitamin D 25 hydroxy; Future  -     Iron Panel (Includes Ferritin, Iron Sat%, Iron, and TIBC); Future  -     Ferritin;  Future  -     CBC and differential; Future  -     Hemoglobin A1C; Future    Hashimoto's thyroiditis  -     POCT hemoglobin A1c  -     Microalbumin / creatinine urine ratio  -     Sjogren's Antibodies; Future  -     Sedimentation rate, automated; Future  -     C-reactive protein; Future  -     C4 complement; Future  -     C3 complement; Future  -     Angiotensin converting enzyme; Future  -     Anaplasma Phagocytophilum, PCR; Future  -     RAYNE Screen w/ Reflex to Titer/Pattern; Future  -     Cyclic citrul peptide antibody, IgG; Future  -     RF Screen w/ Reflex to Titer; Future  -     Uric acid; Future  -     Celiac Disease Antibody Profile; Future  -     Diagnostic ultrasound of joints; Future  -     T3, free; Future  -     T4, free; Future  -     TSH, 3rd generation; Future  -     Vitamin D 25 hydroxy; Future  -     Iron Panel (Includes Ferritin, Iron Sat%, Iron, and TIBC); Future  -     Ferritin; Future  -     Ambulatory referral to Endocrinology; Future  -     CBC and differential; Future  -     Hemoglobin A1C; Future    Vitamin D deficiency  -     POCT hemoglobin A1c  -     Microalbumin / creatinine urine ratio  -     Sjogren's Antibodies; Future  -     Sedimentation rate, automated; Future  -     C-reactive protein; Future  -     C4 complement; Future  -     C3 complement; Future  -     Angiotensin converting enzyme; Future  -     Anaplasma Phagocytophilum, PCR; Future  -     RAYNE Screen w/ Reflex to Titer/Pattern; Future  -     Cyclic citrul peptide antibody, IgG; Future  -     RF Screen w/ Reflex to Titer; Future  -     Uric acid; Future  -     Celiac Disease Antibody Profile; Future  -     Diagnostic ultrasound of joints; Future  -     T3, free; Future  -     T4, free; Future  -     TSH, 3rd generation; Future  -     Vitamin D 25 hydroxy; Future  -     Iron Panel (Includes Ferritin, Iron Sat%, Iron, and TIBC); Future  -     Ferritin;  Future  -     CBC and differential; Future  -     Hemoglobin A1C; Future     Atypia of undetermined significance (Bronson Category   -     POCT hemoglobin A1c  -     Microalbumin / creatinine urine ratio  -     Sjogren's Antibodies; Future  -     Sedimentation rate, automated; Future  -     C-reactive protein; Future  -     C4 complement; Future  -     C3 complement; Future  -     Angiotensin converting enzyme; Future  -     Anaplasma Phagocytophilum, PCR; Future  -     RAYNE Screen w/ Reflex to Titer/Pattern; Future  -     Cyclic citrul peptide antibody, IgG; Future  -     RF Screen w/ Reflex to Titer; Future  -     Uric acid; Future  -     Celiac Disease Antibody Profile; Future  -     Diagnostic ultrasound of joints; Future  -     T3, free; Future  -     T4, free; Future  -     TSH, 3rd generation; Future  -     Vitamin D 25 hydroxy; Future  -     Iron Panel (Includes Ferritin, Iron Sat%, Iron, and TIBC); Future  -     Ferritin; Future  -     Ambulatory referral to Endocrinology; Future  -     CBC and differential; Future  -     Hemoglobin A1C; Future        The patient was counseled regarding instructions for management, risk factor reductions, patient and family education,impressions, risks and benefits of treatment options, side effects of medications, importance of compliance with treatment  The treatment plan was reviewed with the patient/guardian and patient/guardian understands and agrees with the treatment plan              Current Outpatient Medications:     albuterol (2 5 mg/3 mL) 0 083 % nebulizer solution, Inhale, Disp: , Rfl:     Cholecalciferol 18608 units capsule, Take 1 cap po weekly, Disp: 12 capsule, Rfl: 3    levothyroxine 25 mcg tablet, Take 1 5 tabs po QAM, one hour prior to breakfast , Disp: 45 tablet, Rfl: 2    ascorbic acid (VITAMIN C) 250 mg tablet, Take 1 twice a day with iron, Disp: 100 tablet, Rfl: 2    ferrous sulfate 324 (65 Fe) mg, Take 1 twice a day with vitamin-C, Disp: 100 tablet, Rfl: 2    Naltrexone-buPROPion HCl ER 8-90 MG TB12, 1 tab daily x 7 days, 1 tab twice daily x 7 days, 2 tabs in AM and 1 tab in PM x 7 days, then 2 tabs twice daily, Disp: 120 tablet, Rfl: 0    Subjective:      Patient ID: Jocelin Titus is a 38 y o  female  Pain all joint constants, several months      The following portions of the patient's history were reviewed and updated as appropriate:   She has a past medical history of Bell's palsy, Diabetes mellitus during pregnancy, and Migraine  ,  does not have any pertinent problems on file  ,   has a past surgical history that includes  section; Hysteroscopy; and US guided thyroid biopsy (2019)  ,  family history includes Cervical cancer in her maternal aunt; Heart disease in her father; Hyperlipidemia in her father; Hypertension in her father; Migraines in her mother  ,   reports that she has never smoked  She has never used smokeless tobacco  She reports that she drinks alcohol  She reports that she does not use drugs  ,  is allergic to aspirin and iodinated diagnostic agents       Review of Systems   Constitutional: Negative for appetite change, chills, fatigue, fever and unexpected weight change  HENT: Negative for congestion, ear pain, facial swelling, hearing loss, mouth sores, nosebleeds, postnasal drip, rhinorrhea, sinus pain, sore throat, trouble swallowing and voice change  Eyes: Negative for pain, discharge, redness and visual disturbance  Respiratory: Negative for apnea, chest tightness, shortness of breath, wheezing and stridor  Cardiovascular: Negative for chest pain, palpitations and leg swelling  Gastrointestinal: Negative for abdominal distention, abdominal pain, blood in stool, constipation, diarrhea and vomiting  Endocrine: Negative for cold intolerance, heat intolerance, polydipsia, polyphagia and polyuria  Genitourinary: Negative for difficulty urinating, dysuria, flank pain, frequency, genital sores, hematuria and urgency  Musculoskeletal: Positive for arthralgias  Negative for back pain     Skin: Negative for rash and wound  Allergic/Immunologic: Negative for environmental allergies, food allergies and immunocompromised state  Neurological: Negative for dizziness, tremors, seizures, syncope, facial asymmetry, speech difficulty, weakness, light-headedness, numbness and headaches  Hematological: Negative for adenopathy  Does not bruise/bleed easily  Psychiatric/Behavioral: Negative for agitation, behavioral problems, dysphoric mood, hallucinations, self-injury, sleep disturbance and suicidal ideas  The patient is not hyperactive            Objective:  /60 (BP Location: Left arm, Patient Position: Sitting)   Pulse (!) 107   Temp 99 8 °F (37 7 °C) (Tympanic)   Resp 16   Ht 5' 6" (1 676 m)   Wt 118 kg (261 lb 3 2 oz)   SpO2 98%   BMI 42 16 kg/m²     Lab Review  Transcribe Orders on 09/06/2019   Component Date Value    TSH 3RD GENERATON 09/06/2019 3 530     WBC 09/06/2019 5 67     RBC 09/06/2019 3 94     Hemoglobin 09/06/2019 10 8*    Hematocrit 09/06/2019 34 7*    MCV 09/06/2019 88     MCH 09/06/2019 27 4     MCHC 09/06/2019 31 1*    RDW 09/06/2019 13 8     MPV 09/06/2019 13 3*    Platelets 30/27/6491 195     nRBC 09/06/2019 0     Neutrophils Relative 09/06/2019 60     Immat GRANS % 09/06/2019 0     Lymphocytes Relative 09/06/2019 30     Monocytes Relative 09/06/2019 7     Eosinophils Relative 09/06/2019 3     Basophils Relative 09/06/2019 0     Neutrophils Absolute 09/06/2019 3 33     Immature Grans Absolute 09/06/2019 0 01     Lymphocytes Absolute 09/06/2019 1 71     Monocytes Absolute 09/06/2019 0 41     Eosinophils Absolute 09/06/2019 0 19     Basophils Absolute 09/06/2019 0 02    Hospital Outpatient Visit on 06/13/2019   Component Date Value    Case Report 06/13/2019                      Value:Non-gynecologic Cytology                          Case: [id number]                                  Authorizing Provider:  NAMITA Gutiérrez          Collected: 06/13/2019 6137              Ordering Location:     [address] [place] [address] Received:            06/13/2019 0957                                     Ultrasound                                                                   Pathologist:           Whit Holt MD                                                                Specimens:   A) - Thyroid, Left, mid pole                                                                        B) - Thyroid, Left, mid pole                                                               Final Diagnosis 06/13/2019                      Value: This result contains rich text formatting which cannot be displayed here   Note 06/13/2019                      Value: This result contains rich text formatting which cannot be displayed here   Intraoperative Consultat* 06/13/2019                      Value: This result contains rich text formatting which cannot be displayed here  [facility] Gross Description 06/13/2019                      Value: This result contains rich text formatting which cannot be displayed here   Clinical Information 06/13/2019                      Value:Size: 1 8x1 0x1 2cm Margins: SMOOTH Echogenicity: SOLID Microcalcs: N/A Flow: N/A Size change: N/A Suspicion level: INTERMEDIATE Hx of Hashimoto's Thyroiditis: N/A    Additional Information 06/13/2019                      Value: This result contains rich text formatting which cannot be displayed here  Appointment on 06/06/2019   Component Date Value    Vit D, 25-Hydroxy 06/06/2019 13 2*    Vitamin B-12 06/06/2019 340     Iron 06/06/2019 52     TIBC 06/06/2019 443     Ferritin 06/06/2019 6*   Transcribe Orders on 05/20/2019   Component Date Value    LDL Direct 05/20/2019 101*    Cholesterol 05/20/2019 172     Triglycerides 05/20/2019 69     HDL, Direct 05/20/2019 54     LDL Calculated 05/20/2019 104*    Creatinine, Ur 05/20/2019 112 0     Microalbum  ,U,Random 05/20/2019 8 2     Microalb Creat Ratio 05/20/2019 7     T3, Free 05/20/2019 2 28*    Free T4 05/20/2019 0 94     TSH 3RD GENERATON 05/20/2019 4 200*    THYROID MICROSOMAL ANTIB* 05/20/2019 251*    Thyroglobulin Ab 05/20/2019 <1 0     Sodium 05/20/2019 138     Potassium 05/20/2019 4 0     Chloride 05/20/2019 109*    CO2 05/20/2019 25     ANION GAP 05/20/2019 4     BUN 05/20/2019 12     Creatinine 05/20/2019 0 79     Glucose, Fasting 05/20/2019 146*    Calcium 05/20/2019 8 5     AST 05/20/2019 12     ALT 05/20/2019 21     Alkaline Phosphatase 05/20/2019 96     Total Protein 05/20/2019 7 2     Albumin 05/20/2019 3 4*    Total Bilirubin 05/20/2019 0 50     eGFR 05/20/2019 92     Hemoglobin A1C 05/20/2019 6 7*    EAG 05/20/2019 146     Thyroglobulin-HAMLET 05/20/2019 65 9*         Imaging  @[email or web address]@     No orders to display     No results found for this or any previous visit  Physical Exam        BMI Counseling: Body mass index is 42 16 kg/m²  The BMI is above normal  Nutrition recommendations include reducing portion sizes, decreasing overall calorie intake, 3-5 servings of fruits/vegetables daily, reducing fast food intake, consuming healthier snacks and decreasing soda and/or juice intake  Exercise recommendations include moderate aerobic physical activity for 150 minutes/week and exercising 3-5 times per week  Pharmacotherapy was ordered for patient to aid in weight loss

## 2019-09-12 NOTE — PATIENT INSTRUCTIONS
Repeat thyroid nodule bx next week had inconclusive bx prior  1 patient with some atypia on biopsy will refer to Endocrinology for further evaluation along with her Hashimoto's thyroiditis  2  Patient with diffuse joint pain will do laboratory work as well as ultrasound of the joints due to rule out any inflammatory arthritis may consider referral to Rheumatology  3  Patient with iron deficiency anemia secondary to heavy periods has had gyn evaluation will be taking iron with vitamin-C twice a day with meals if she develops constipation to stop the iron as this can cause constipation and then restarted with the constipation has resolved with a stool softener  4  Obesity will try naltrexone bupropion to try and lose 3-5 lb per month   5  Hashimoto's thyroid it is TSH is at goal will recheck in 2 months  6  Vitamin-D deficiency on [address]57 units of vitamin-D 2 a day will recheck in 2 months  7   Type 2 diabetes A1c is at goal recheck in 2 months

## 2019-09-16 ENCOUNTER — APPOINTMENT (OUTPATIENT)
Dept: LAB | Facility: CLINIC | Age: 44
End: 2019-09-16
Payer: COMMERCIAL

## 2019-09-16 DIAGNOSIS — Z23 NEED FOR IMMUNIZATION AGAINST INFLUENZA: ICD-10-CM

## 2019-09-16 DIAGNOSIS — D50.0 IRON DEFICIENCY ANEMIA DUE TO CHRONIC BLOOD LOSS: ICD-10-CM

## 2019-09-16 DIAGNOSIS — E66.01 MORBID OBESITY WITH BMI OF 40.0-44.9, ADULT (HCC): ICD-10-CM

## 2019-09-16 DIAGNOSIS — Z82.61 FAMILY HISTORY OF RHEUMATOID ARTHRITIS: ICD-10-CM

## 2019-09-16 DIAGNOSIS — R70.0 ELEVATED SED RATE: ICD-10-CM

## 2019-09-16 DIAGNOSIS — Z84.0 FAMILY HISTORY OF LUPUS ERYTHEMATOSUS: ICD-10-CM

## 2019-09-16 DIAGNOSIS — E06.3 HASHIMOTO'S THYROIDITIS: ICD-10-CM

## 2019-09-16 DIAGNOSIS — M25.542 ARTHRALGIA OF BOTH HANDS: ICD-10-CM

## 2019-09-16 DIAGNOSIS — M25.541 ARTHRALGIA OF BOTH HANDS: ICD-10-CM

## 2019-09-16 DIAGNOSIS — E55.9 VITAMIN D DEFICIENCY: ICD-10-CM

## 2019-09-16 DIAGNOSIS — E04.1 THYROID NODULE: ICD-10-CM

## 2019-09-16 DIAGNOSIS — E66.01 OBESITY, MORBID (HCC): ICD-10-CM

## 2019-09-16 DIAGNOSIS — R89.9 ABNORMAL THYROID BIOPSY: ICD-10-CM

## 2019-09-16 LAB
C3 SERPL-MCNC: 139 MG/DL (ref 90–180)
C4 SERPL-MCNC: 32 MG/DL (ref 10–40)
CREAT UR-MCNC: 162 MG/DL
CRP SERPL QL: 5.5 MG/L
ERYTHROCYTE [SEDIMENTATION RATE] IN BLOOD: 22 MM/HOUR (ref 0–20)
MICROALBUMIN UR-MCNC: 8.6 MG/L (ref 0–20)
MICROALBUMIN/CREAT 24H UR: 5 MG/G CREATININE (ref 0–30)
URATE SERPL-MCNC: 3.9 MG/DL (ref 2–6.8)

## 2019-09-16 PROCEDURE — 36415 COLL VENOUS BLD VENIPUNCTURE: CPT

## 2019-09-16 PROCEDURE — 82570 ASSAY OF URINE CREATININE: CPT | Performed by: INTERNAL MEDICINE

## 2019-09-16 PROCEDURE — 86255 FLUORESCENT ANTIBODY SCREEN: CPT

## 2019-09-16 PROCEDURE — 82784 ASSAY IGA/IGD/IGG/IGM EACH: CPT

## 2019-09-16 PROCEDURE — 86235 NUCLEAR ANTIGEN ANTIBODY: CPT

## 2019-09-16 PROCEDURE — 83516 IMMUNOASSAY NONANTIBODY: CPT

## 2019-09-16 PROCEDURE — 86160 COMPLEMENT ANTIGEN: CPT

## 2019-09-16 PROCEDURE — 86200 CCP ANTIBODY: CPT

## 2019-09-16 PROCEDURE — 82164 ANGIOTENSIN I ENZYME TEST: CPT

## 2019-09-16 PROCEDURE — 84550 ASSAY OF BLOOD/URIC ACID: CPT

## 2019-09-16 PROCEDURE — 86430 RHEUMATOID FACTOR TEST QUAL: CPT

## 2019-09-16 PROCEDURE — 87798 DETECT AGENT NOS DNA AMP: CPT

## 2019-09-16 PROCEDURE — 82043 UR ALBUMIN QUANTITATIVE: CPT | Performed by: INTERNAL MEDICINE

## 2019-09-16 PROCEDURE — 86038 ANTINUCLEAR ANTIBODIES: CPT

## 2019-09-16 PROCEDURE — 85652 RBC SED RATE AUTOMATED: CPT

## 2019-09-16 PROCEDURE — 86140 C-REACTIVE PROTEIN: CPT

## 2019-09-17 ENCOUNTER — TELEPHONE (OUTPATIENT)
Dept: INTERNAL MEDICINE CLINIC | Facility: CLINIC | Age: 44
End: 2019-09-17

## 2019-09-17 DIAGNOSIS — E06.3 HASHIMOTO'S THYROIDITIS: ICD-10-CM

## 2019-09-17 DIAGNOSIS — E04.1 THYROID NODULE: Primary | ICD-10-CM

## 2019-09-17 LAB
ACE SERPL-CCNC: 38 U/L (ref 14–82)
ENA SS-A AB SER-ACNC: <0.2 AI (ref 0–0.9)
ENA SS-B AB SER-ACNC: <0.2 AI (ref 0–0.9)
ENDOMYSIUM IGA SER QL: NEGATIVE
GLIADIN PEPTIDE IGA SER-ACNC: 4 UNITS (ref 0–19)
GLIADIN PEPTIDE IGG SER-ACNC: 2 UNITS (ref 0–19)
IGA SERPL-MCNC: 244 MG/DL (ref 87–352)
RHEUMATOID FACT SER QL LA: NEGATIVE
TTG IGA SER-ACNC: <2 U/ML (ref 0–3)
TTG IGG SER-ACNC: <2 U/ML (ref 0–5)

## 2019-09-17 NOTE — TELEPHONE ENCOUNTER
Liza Crain is requesting New Order for US guided thyroid biopsy  New Order must read  US guided thyroid biopsy with Annette  Because it is a repeat US  Patient is scheduled for tomorrow  Rosella Goldmann

## 2019-09-18 LAB — RYE IGE QN: NEGATIVE

## 2019-09-19 ENCOUNTER — HOSPITAL ENCOUNTER (OUTPATIENT)
Dept: ULTRASOUND IMAGING | Facility: HOSPITAL | Age: 44
Discharge: HOME | End: 2019-09-19
Payer: COMMERCIAL

## 2019-09-19 DIAGNOSIS — E04.1 THYROID NODULE: ICD-10-CM

## 2019-09-19 LAB — CCP IGA+IGG SERPL IA-ACNC: 4 UNITS (ref 0–19)

## 2019-09-19 PROCEDURE — 10005 FNA BX W/US GDN 1ST LES: CPT

## 2019-09-19 PROCEDURE — 88173 CYTOPATH EVAL FNA REPORT: CPT | Performed by: PATHOLOGY

## 2019-09-19 PROCEDURE — 88172 CYTP DX EVAL FNA 1ST EA SITE: CPT | Performed by: PATHOLOGY

## 2019-09-19 RX ORDER — LIDOCAINE HYDROCHLORIDE 10 MG/ML
4 INJECTION, SOLUTION EPIDURAL; INFILTRATION; INTRACAUDAL; PERINEURAL ONCE
Status: DISCONTINUED | OUTPATIENT
Start: 2019-09-19 | End: 2019-09-23 | Stop reason: HOSPADM

## 2019-09-20 LAB — A PHAGOCYTOPH DNA BLD QL NAA+PROBE: NEGATIVE

## 2019-09-23 ENCOUNTER — TELEPHONE (OUTPATIENT)
Dept: INTERNAL MEDICINE CLINIC | Facility: CLINIC | Age: 44
End: 2019-09-23

## 2019-09-23 NOTE — TELEPHONE ENCOUNTER
----- Message from [facility], [address]  sent at 9/20/2019  3:11 PM EDT -----  Please call  Patient had repeat thyroid biopsy yesterday  We are waiting on the result  Please just ask her how she is doing post- biopsy   thanks

## 2019-09-26 ENCOUNTER — TELEPHONE (OUTPATIENT)
Dept: INTERNAL MEDICINE CLINIC | Facility: CLINIC | Age: 44
End: 2019-09-26

## 2019-09-26 DIAGNOSIS — E03.9 HYPOTHYROIDISM, UNSPECIFIED TYPE: ICD-10-CM

## 2019-09-26 DIAGNOSIS — E06.3 HASHIMOTO'S THYROIDITIS: ICD-10-CM

## 2019-09-26 NOTE — TELEPHONE ENCOUNTER
Repeat thyroid biopsy showed no cancer but an indeterminate biopsy report and follow-up should be gauge by Endocrinology as we discussed at her previous visit

## 2019-10-04 ENCOUNTER — APPOINTMENT (OUTPATIENT)
Dept: LAB | Facility: CLINIC | Age: 44
End: 2019-10-04
Payer: COMMERCIAL

## 2019-10-04 LAB — TSH SERPL DL<=0.05 MIU/L-ACNC: 3.82 UIU/ML (ref 0.36–3.74)

## 2019-10-04 PROCEDURE — 84443 ASSAY THYROID STIM HORMONE: CPT

## 2019-10-04 PROCEDURE — 36415 COLL VENOUS BLD VENIPUNCTURE: CPT

## 2019-10-17 ENCOUNTER — TRANSCRIBE ORDERS (OUTPATIENT)
Dept: LAB | Facility: HOSPITAL | Age: 44
End: 2019-10-17

## 2019-11-18 ENCOUNTER — TRANSCRIBE ORDERS (OUTPATIENT)
Dept: LAB | Facility: CLINIC | Age: 44
End: 2019-11-18

## 2019-11-18 ENCOUNTER — APPOINTMENT (OUTPATIENT)
Dept: LAB | Facility: CLINIC | Age: 44
End: 2019-11-18
Payer: COMMERCIAL

## 2019-11-18 DIAGNOSIS — M25.541 ARTHRALGIA OF BOTH HANDS: ICD-10-CM

## 2019-11-18 DIAGNOSIS — M25.542 ARTHRALGIA OF BOTH HANDS: ICD-10-CM

## 2019-11-18 DIAGNOSIS — E66.01 OBESITY, MORBID (HCC): ICD-10-CM

## 2019-11-18 DIAGNOSIS — Z84.0 FAMILY HISTORY OF LUPUS ERYTHEMATOSUS: ICD-10-CM

## 2019-11-18 DIAGNOSIS — Z82.61 FAMILY HISTORY OF RHEUMATOID ARTHRITIS: ICD-10-CM

## 2019-11-18 DIAGNOSIS — E04.1 THYROID NODULE: ICD-10-CM

## 2019-11-18 DIAGNOSIS — Z23 NEED FOR IMMUNIZATION AGAINST INFLUENZA: ICD-10-CM

## 2019-11-18 DIAGNOSIS — D50.0 IRON DEFICIENCY ANEMIA DUE TO CHRONIC BLOOD LOSS: ICD-10-CM

## 2019-11-18 DIAGNOSIS — E55.9 VITAMIN D DEFICIENCY: ICD-10-CM

## 2019-11-18 DIAGNOSIS — E66.01 MORBID OBESITY WITH BMI OF 40.0-44.9, ADULT (HCC): ICD-10-CM

## 2019-11-18 DIAGNOSIS — R70.0 ELEVATED SED RATE: ICD-10-CM

## 2019-11-18 DIAGNOSIS — E03.9 HYPOTHYROIDISM, UNSPECIFIED TYPE: ICD-10-CM

## 2019-11-18 DIAGNOSIS — E06.3 HASHIMOTO'S THYROIDITIS: ICD-10-CM

## 2019-11-18 DIAGNOSIS — R89.9 ABNORMAL THYROID BIOPSY: ICD-10-CM

## 2019-11-18 LAB
25(OH)D3 SERPL-MCNC: 34.5 NG/ML (ref 30–100)
BASOPHILS # BLD AUTO: 0.02 THOUSANDS/ΜL (ref 0–0.1)
BASOPHILS NFR BLD AUTO: 0 % (ref 0–1)
EOSINOPHIL # BLD AUTO: 0.24 THOUSAND/ΜL (ref 0–0.61)
EOSINOPHIL NFR BLD AUTO: 5 % (ref 0–6)
ERYTHROCYTE [DISTWIDTH] IN BLOOD BY AUTOMATED COUNT: 13.7 % (ref 11.6–15.1)
EST. AVERAGE GLUCOSE BLD GHB EST-MCNC: 137 MG/DL
FERRITIN SERPL-MCNC: 5 NG/ML (ref 8–388)
HBA1C MFR BLD: 6.4 % (ref 4.2–6.3)
HCT VFR BLD AUTO: 36.7 % (ref 34.8–46.1)
HGB BLD-MCNC: 11.2 G/DL (ref 11.5–15.4)
IMM GRANULOCYTES # BLD AUTO: 0.01 THOUSAND/UL (ref 0–0.2)
IMM GRANULOCYTES NFR BLD AUTO: 0 % (ref 0–2)
IRON SATN MFR SERPL: 8 %
IRON SERPL-MCNC: 35 UG/DL (ref 50–170)
LYMPHOCYTES # BLD AUTO: 1.43 THOUSANDS/ΜL (ref 0.6–4.47)
LYMPHOCYTES NFR BLD AUTO: 27 % (ref 14–44)
MCH RBC QN AUTO: 27.4 PG (ref 26.8–34.3)
MCHC RBC AUTO-ENTMCNC: 30.5 G/DL (ref 31.4–37.4)
MCV RBC AUTO: 90 FL (ref 82–98)
MONOCYTES # BLD AUTO: 0.29 THOUSAND/ΜL (ref 0.17–1.22)
MONOCYTES NFR BLD AUTO: 6 % (ref 4–12)
NEUTROPHILS # BLD AUTO: 3.29 THOUSANDS/ΜL (ref 1.85–7.62)
NEUTS SEG NFR BLD AUTO: 62 % (ref 43–75)
NRBC BLD AUTO-RTO: 0 /100 WBCS
PLATELET # BLD AUTO: 220 THOUSANDS/UL (ref 149–390)
PMV BLD AUTO: 12.8 FL (ref 8.9–12.7)
RBC # BLD AUTO: 4.09 MILLION/UL (ref 3.81–5.12)
T3FREE SERPL-MCNC: 2.7 PG/ML (ref 2.3–4.2)
T4 FREE SERPL-MCNC: 1.14 NG/DL (ref 0.76–1.46)
TIBC SERPL-MCNC: 462 UG/DL (ref 250–450)
TSH SERPL DL<=0.05 MIU/L-ACNC: 0.9 UIU/ML (ref 0.36–3.74)
WBC # BLD AUTO: 5.28 THOUSAND/UL (ref 4.31–10.16)

## 2019-11-18 PROCEDURE — 84439 ASSAY OF FREE THYROXINE: CPT

## 2019-11-18 PROCEDURE — 36415 COLL VENOUS BLD VENIPUNCTURE: CPT

## 2019-11-18 PROCEDURE — 83036 HEMOGLOBIN GLYCOSYLATED A1C: CPT

## 2019-11-18 PROCEDURE — 82728 ASSAY OF FERRITIN: CPT

## 2019-11-18 PROCEDURE — 82306 VITAMIN D 25 HYDROXY: CPT

## 2019-11-18 PROCEDURE — 84443 ASSAY THYROID STIM HORMONE: CPT

## 2019-11-18 PROCEDURE — 84481 FREE ASSAY (FT-3): CPT

## 2019-11-18 PROCEDURE — 83550 IRON BINDING TEST: CPT

## 2019-11-18 PROCEDURE — 85025 COMPLETE CBC W/AUTO DIFF WBC: CPT

## 2019-11-18 PROCEDURE — 83540 ASSAY OF IRON: CPT

## 2019-11-20 ENCOUNTER — OFFICE VISIT (OUTPATIENT)
Dept: INTERNAL MEDICINE CLINIC | Facility: CLINIC | Age: 44
End: 2019-11-20
Payer: COMMERCIAL

## 2019-11-20 VITALS
SYSTOLIC BLOOD PRESSURE: 115 MMHG | HEART RATE: 98 BPM | TEMPERATURE: 98.4 F | DIASTOLIC BLOOD PRESSURE: 70 MMHG | HEIGHT: 66 IN | BODY MASS INDEX: 42.04 KG/M2 | OXYGEN SATURATION: 100 % | RESPIRATION RATE: 18 BRPM | WEIGHT: 261.6 LBS

## 2019-11-20 DIAGNOSIS — Z11.1 PPD SCREENING TEST: Primary | ICD-10-CM

## 2019-11-20 DIAGNOSIS — E11.9 TYPE 2 DIABETES MELLITUS WITHOUT COMPLICATION, WITHOUT LONG-TERM CURRENT USE OF INSULIN (HCC): ICD-10-CM

## 2019-11-20 DIAGNOSIS — E66.01 MORBID OBESITY WITH BMI OF 40.0-44.9, ADULT (HCC): ICD-10-CM

## 2019-11-20 DIAGNOSIS — E66.01 OBESITY, MORBID (HCC): ICD-10-CM

## 2019-11-20 DIAGNOSIS — E06.3 HASHIMOTO'S THYROIDITIS: ICD-10-CM

## 2019-11-20 PROCEDURE — 1036F TOBACCO NON-USER: CPT | Performed by: INTERNAL MEDICINE

## 2019-11-20 PROCEDURE — 99214 OFFICE O/P EST MOD 30 MIN: CPT | Performed by: INTERNAL MEDICINE

## 2019-11-20 PROCEDURE — 86580 TB INTRADERMAL TEST: CPT

## 2019-11-20 RX ORDER — LEVOTHYROXINE SODIUM 0.1 MG/1
TABLET ORAL
COMMUNITY
Start: 2019-11-15 | End: 2021-03-15 | Stop reason: SDUPTHER

## 2019-11-20 NOTE — PROGRESS NOTES
Assessment/Plan:  2 years no change in weight will reconsider bariatric surgery or if gain, 20 pounds a year  1 patient will be getting gyn exam with Liz /Natanael  2  Type 2 diabetes A1c is at goal continue diet lifestyle  3  Morbid obesity did discuss bariatric surgery patient is very motivated to start exercising before work her new job as an  as well as decrease calories has been working with a nutritionist goal is to lose at least 20 lb a year if she gains weight or if she is unable to lose weight in 2 year she will reconsider bariatric evaluation patient has legitimate concerns about bariatric surgery  4  Hypothyroidism is at goal          Diagnoses and all orders for this visit:    PPD screening test  -     TB Skin Test  -     T4, free; Future  -     TSH, 3rd generation; Future  -     T3, free; Future  -     CBC and differential; Future  -     Comprehensive metabolic panel; Future  -     Hemoglobin A1C; Future  -     Lipid Panel with Direct LDL reflex; Future  -     LDL cholesterol, direct; Future    Morbid obesity with BMI of 40 0-44 9, adult (HCC)  -     Naltrexone-buPROPion HCl ER 8-90 MG TB12; 1 tab daily x 7 days, 1 tab twice daily x 7 days, 2 tabs in AM and 1 tab in PM x 7 days, then 2 tabs twice daily  -     T4, free; Future  -     TSH, 3rd generation; Future  -     T3, free; Future  -     CBC and differential; Future  -     Comprehensive metabolic panel; Future  -     Hemoglobin A1C; Future  -     Lipid Panel with Direct LDL reflex; Future  -     LDL cholesterol, direct; Future    Type 2 diabetes mellitus without complication, without long-term current use of insulin (HCC)  -     T4, free; Future  -     TSH, 3rd generation; Future  -     T3, free; Future  -     CBC and differential; Future  -     Comprehensive metabolic panel; Future  -     Hemoglobin A1C; Future  -     Lipid Panel with Direct LDL reflex; Future  -     LDL cholesterol, direct;  Future    Hashimoto's thyroiditis  -     T4, free; Future  -     TSH, 3rd generation; Future  -     T3, free; Future  -     CBC and differential; Future  -     Comprehensive metabolic panel; Future  -     Hemoglobin A1C; Future  -     Lipid Panel with Direct LDL reflex; Future  -     LDL cholesterol, direct; Future    Obesity, morbid ([address] )    Other orders  -     levothyroxine 100 mcg tablet    Patient's shoes and socks removed  Right Foot/Ankle   Right Foot Inspection  Skin Exam: skin normal and skin intact no dry skin, no warmth, no callus, no erythema, no maceration, no abnormal color, no pre-ulcer, no ulcer and no callus                          Toe Exam: no swelling, no tenderness, erythema and  no right toe deformity  Sensory   Vibration: intact  Proprioception: intact   Monofilament testing: intact  Vascular    The right DP pulse is 1+  The right PT pulse is 1+  Right Toe  - Comprehensive Exam  Ecchymosis: none  Swelling: none   Tenderness: none         Left Foot/Ankle  Left Foot Inspection  Skin Exam: skin normal and skin intactno dry skin, no warmth, no erythema, no maceration, normal color, no pre-ulcer, no ulcer and no callus                         Toe Exam: no swelling, no tenderness, no erythema and no left toe deformity                   Sensory   Vibration: intact  Proprioception: intact  Monofilament: intact  Vascular    The left DP pulse is 1+  The left PT pulse is 1+  Left Toe  - Comprehensive Exam  Ecchymosis: none  Swelling: none   Tenderness: none       Assign Risk Category:  No deformity present; No loss of protective sensation; No weak pulses       Risk: 0      The patient was counseled regarding instructions for management, risk factor reductions, patient and family education,impressions, risks and benefits of treatment options, side effects of medications, importance of compliance with treatment   The treatment plan was reviewed with the patient/guardian and patient/guardian understands and agrees with the treatment plan  Current Outpatient Medications:     albuterol (2 5 mg/3 mL) 0 083 % nebulizer solution, Inhale, Disp: , Rfl:     ascorbic acid (VITAMIN C) 250 mg tablet, Take 1 twice a day with iron, Disp: 100 tablet, Rfl: 2    Cholecalciferol 84123 units capsule, Take 1 cap po weekly, Disp: 12 capsule, Rfl: 3    ferrous sulfate 324 (65 Fe) mg, Take 1 twice a day with vitamin-C, Disp: 100 tablet, Rfl: 2    levothyroxine 100 mcg tablet, , Disp: , Rfl:     Naltrexone-buPROPion HCl ER 8-90 MG TB12, 1 tab daily x 7 days, 1 tab twice daily x 7 days, 2 tabs in AM and 1 tab in PM x 7 days, then 2 tabs twice daily, Disp: 120 tablet, Rfl: 0    Subjective:      Patient ID: Elton Mckeon is a 38 y o  female  Tolerating medications well no hot or cold intolerance      The following portions of the patient's history were reviewed and updated as appropriate:   She has a past medical history of Bell's palsy, Diabetes mellitus during pregnancy, and Migraine  ,  does not have any pertinent problems on file  ,   has a past surgical history that includes  section; Hysteroscopy; US guided thyroid biopsy (2019); and US guided thyroid biopsy (2019)  ,  family history includes Cervical cancer in her maternal aunt; Heart disease in her father; Hyperlipidemia in her father; Hypertension in her father; Migraines in her mother  ,   reports that she has never smoked  She has never used smokeless tobacco  She reports that she drinks alcohol  She reports that she does not use drugs  ,  is allergic to aspirin and iodinated diagnostic agents       Review of Systems   Constitutional: Negative for appetite change, chills, fatigue, fever and unexpected weight change  HENT: Negative for congestion, ear pain, facial swelling, hearing loss, mouth sores, nosebleeds, postnasal drip, rhinorrhea, sinus pain, sore throat, trouble swallowing and voice change      Eyes: Negative for pain, discharge, redness and visual disturbance  Respiratory: Negative for apnea, chest tightness, shortness of breath, wheezing and stridor  Cardiovascular: Negative for chest pain, palpitations and leg swelling  Gastrointestinal: Negative for abdominal distention, abdominal pain, blood in stool, constipation, diarrhea and vomiting  Endocrine: Negative for cold intolerance, heat intolerance, polydipsia, polyphagia and polyuria  Genitourinary: Negative for difficulty urinating, dysuria, flank pain, frequency, genital sores, hematuria and urgency  Musculoskeletal: Negative for arthralgias and back pain  Skin: Negative for rash and wound  Allergic/Immunologic: Negative for environmental allergies, food allergies and immunocompromised state  Neurological: Negative for dizziness, tremors, seizures, syncope, facial asymmetry, speech difficulty, weakness, light-headedness, numbness and headaches  Hematological: Negative for adenopathy  Does not bruise/bleed easily  Psychiatric/Behavioral: Negative for agitation, behavioral problems, dysphoric mood, hallucinations, self-injury, sleep disturbance and suicidal ideas  The patient is not hyperactive            Objective:  /70 (BP Location: Left arm, Patient Position: Sitting)   Pulse 98   Temp 98 4 °F (36 9 °C) (Tympanic)   Resp 18   Ht 5' 6" (1 676 m)   Wt 119 kg (261 lb 9 6 oz)   SpO2 100%   BMI 42 22 kg/m²     Lab Review  Appointment on 11/18/2019   Component Date Value    TSH 3RD GENERATON 11/18/2019 0 898     T3, Free 11/18/2019 2 70     Free T4 11/18/2019 1 14     Vit D, 25-Hydroxy 11/18/2019 34 5     WBC 11/18/2019 5 28     RBC 11/18/2019 4 09     Hemoglobin 11/18/2019 11 2*    Hematocrit 11/18/2019 36 7     MCV 11/18/2019 90     MCH 11/18/2019 27 4     MCHC 11/18/2019 30 5*    RDW 11/18/2019 13 7     MPV 11/18/2019 12 8*    Platelets 74/12/0057 220     nRBC 11/18/2019 0     Neutrophils Relative 11/18/2019 62     Immat GRANS % 11/18/2019 0     Lymphocytes Relative 11/18/2019 27     Monocytes Relative 11/18/2019 6     Eosinophils Relative 11/18/2019 5     Basophils Relative 11/18/2019 0     Neutrophils Absolute 11/18/2019 3 29     Immature Grans Absolute 11/18/2019 0 01     Lymphocytes Absolute 11/18/2019 1 43     Monocytes Absolute 11/18/2019 0 29     Eosinophils Absolute 11/18/2019 0 24     Basophils Absolute 11/18/2019 0 02     Hemoglobin A1C 11/18/2019 6 4*    EAG 11/18/2019 137     Iron Saturation 11/18/2019 8     TIBC 11/18/2019 462*    Iron 11/18/2019 35*    Ferritin 11/18/2019 5*   Hospital Outpatient Visit on 09/19/2019   Component Date Value    Case Report 09/19/2019                      Value:Non-gynecologic Cytology                          Case: [id number]                                  Authorizing Provider:  NAMITA Richard          Collected:           09/19/2019 1330              Ordering Location:     [address] [place] [address] Received:            09/19/2019 1448                                     Ultrasound                                                                   Pathologist:           Eliu Ott MD                                                   Specimens:   A) - Thyroid, Left, mid pole                                                                        B) - Thyroid, Left, mid pole                                                               Final Diagnosis 09/19/2019                      Value: This result contains rich text formatting which cannot be displayed here   Note 09/19/2019                      Value: This result contains rich text formatting which cannot be displayed here   Intraoperative Consultat* 09/19/2019                      Value: This result contains rich text formatting which cannot be displayed here  Prabhu Lin Description 09/19/2019                      Value: This result contains rich text formatting which cannot be displayed here      Clinical Information 09/19/2019                      Value:Size: 1 9x1 5x1 3cm Margins: SMOOTH Echogenicity: SOLID Microcalcs: ABSENT Flow: INTRANODULAR Size change: N/A Suspicion level: INTERMEDIATE Hx of Hashimoto's Thyroiditis: YES    Additional Information 09/19/2019                      Value: This result contains rich text formatting which cannot be displayed here  Appointment on 09/16/2019   Component Date Value    SS-A (RO) Ab 09/16/2019 <0 2     SS-B (LA) Ab 09/16/2019 <0 2     Sed Rate 09/16/2019 22*    CRP 09/16/2019 5 5*    C4, COMPLEMENT 09/16/2019 32 0     C3 Complement 09/16/2019 139 0     Angio Convert Enzyme 09/16/2019 38     Anaplasma phagocytophilum 09/16/2019 Negative     RAYNE 09/16/2019 Negative     Cyclic Citrullin Peptide* 09/16/2019 4     Rheumatoid Factor 09/16/2019 Negative     Uric Acid 09/16/2019 3 9     IgA 09/16/2019 244     Gliadin IgA 09/16/2019 4     Gliadin IgG 09/16/2019 2     Tissue Transglut Ab IGG 09/16/2019 <2     TISSUE TRANSGLUTAMINASE * 09/16/2019 <2     Endomysial IgA 09/16/2019 Negative    Office Visit on 09/12/2019   Component Date Value    Hemoglobin A1C 09/12/2019 6 1     Creatinine, Ur 09/16/2019 162 0     Microalbum  ,U,Random 09/16/2019 8 6     Microalb Creat Ratio 09/16/2019 5    Transcribe Orders on 09/06/2019   Component Date Value    TSH 3RD GENERATON 09/06/2019 3 530     WBC 09/06/2019 5 67     RBC 09/06/2019 3 94     Hemoglobin 09/06/2019 10 8*    Hematocrit 09/06/2019 34 7*    MCV 09/06/2019 88     MCH 09/06/2019 27 4     MCHC 09/06/2019 31 1*    RDW 09/06/2019 13 8     MPV 09/06/2019 13 3*    Platelets 43/52/0174 195     nRBC 09/06/2019 0     Neutrophils Relative 09/06/2019 60     Immat GRANS % 09/06/2019 0     Lymphocytes Relative 09/06/2019 30     Monocytes Relative 09/06/2019 7     Eosinophils Relative 09/06/2019 3     Basophils Relative 09/06/2019 0     Neutrophils Absolute 09/06/2019 3 33     Immature Grans Absolute 09/06/2019 0 01  Lymphocytes Absolute 09/06/2019 1 71     Monocytes Absolute 09/06/2019 0 41     Eosinophils Absolute 09/06/2019 0 19     Basophils Absolute 09/06/2019 0 02     TSH 3RD GENERATON 10/04/2019 [facility] Outpatient Visit on 06/13/2019   Component Date Value    Case Report 06/13/2019                      Value:Non-gynecologic Cytology                          Case: [id number]                                  Authorizing Provider:  NAMITA Cr          Collected:           06/13/2019 1528              Ordering Location:     [address] [place] [address] Received:            06/13/2019 0957                                     Ultrasound                                                                   Pathologist:           Stephanie Hein MD                                                                Specimens:   A) - Thyroid, Left, mid pole                                                                        B) - Thyroid, Left, mid pole                                                               Final Diagnosis 06/13/2019                      Value: This result contains rich text formatting which cannot be displayed here   Note 06/13/2019                      Value: This result contains rich text formatting which cannot be displayed here   Intraoperative Consultat* 06/13/2019                      Value: This result contains rich text formatting which cannot be displayed here  Randee Bolaños Gross Description 06/13/2019                      Value: This result contains rich text formatting which cannot be displayed here   Clinical Information 06/13/2019                      Value:Size: 1 8x1 0x1 2cm Margins: SMOOTH Echogenicity: SOLID Microcalcs: N/A Flow: N/A Size change: N/A Suspicion level: INTERMEDIATE Hx of Hashimoto's Thyroiditis: N/A    Additional Information 06/13/2019                      Value: This result contains rich text formatting which cannot be displayed here     Appointment on 06/06/2019 Component Date Value    Vit D, 25-Hydroxy 06/06/2019 13 2*    Vitamin B-12 06/06/2019 340     Iron 06/06/2019 52     TIBC 06/06/2019 443     Ferritin 06/06/2019 6*         Imaging  [email or web address]@     No orders to display     No results found for this or any previous visit  Physical Exam   Constitutional: She is oriented to person, place, and time  She appears well-developed  HENT:   Right Ear: External ear normal    Left Ear: External ear normal    Eyes: Right eye exhibits no discharge  Left eye exhibits no discharge  No scleral icterus  Neck: Carotid bruit is not present  No tracheal deviation present  No thyroid mass and no thyromegaly present  Cardiovascular: Normal rate, regular rhythm, normal heart sounds and intact distal pulses  Exam reveals no gallop and no friction rub  Pulses are no weak pulses  No murmur heard  Pulses:       Dorsalis pedis pulses are 1+ on the right side, and 1+ on the left side  Posterior tibial pulses are 1+ on the right side, and 1+ on the left side  Pulmonary/Chest: No respiratory distress  She has no wheezes  She has no rales  Musculoskeletal: She exhibits no edema  Feet:   Right Foot:   Skin Integrity: Negative for ulcer, skin breakdown, erythema, warmth, callus or dry skin  Left Foot:   Skin Integrity: Negative for ulcer, skin breakdown, erythema, warmth, callus or dry skin  Lymphadenopathy:     She has no cervical adenopathy  Neurological: She is alert and oriented to person, place, and time  Coordination normal    Psychiatric: She has a normal mood and affect  Her behavior is normal  Judgment and thought content normal    Nursing note and vitals reviewed

## 2019-11-20 NOTE — PATIENT INSTRUCTIONS
2 years no change in weight will reconsider bariatric surgery or if gain, 20 pounds a year  1 patient will be getting gyn exam with Liz /Natanael  2  Type 2 diabetes A1c is at goal continue diet lifestyle  3  Morbid obesity did discuss bariatric surgery patient is very motivated to start exercising before work her new job as an  as well as decrease calories has been working with a nutritionist goal is to lose at least 20 lb a year if she gains weight or if she is unable to lose weight in 2 year she will reconsider bariatric evaluation patient has legitimate concerns about bariatric surgery  4   Hypothyroidism is at goal

## 2019-11-22 ENCOUNTER — TELEPHONE (OUTPATIENT)
Dept: INTERNAL MEDICINE CLINIC | Facility: CLINIC | Age: 44
End: 2019-11-22

## 2019-11-22 NOTE — TELEPHONE ENCOUNTER
----- Message from Celestine Morrell DO sent at 11/21/2019  3:48 PM EST -----  Please call patient is she still has iron deficiency be sure she is taking her iron

## 2019-12-23 ENCOUNTER — ANNUAL EXAM (OUTPATIENT)
Dept: INTERNAL MEDICINE CLINIC | Facility: CLINIC | Age: 44
End: 2019-12-23
Payer: COMMERCIAL

## 2019-12-23 VITALS
RESPIRATION RATE: 18 BRPM | TEMPERATURE: 98.4 F | HEART RATE: 100 BPM | HEIGHT: 66 IN | WEIGHT: 256.8 LBS | SYSTOLIC BLOOD PRESSURE: 120 MMHG | BODY MASS INDEX: 41.27 KG/M2 | DIASTOLIC BLOOD PRESSURE: 80 MMHG | OXYGEN SATURATION: 100 %

## 2019-12-23 DIAGNOSIS — Z12.39 SCREENING FOR MALIGNANT NEOPLASM OF BREAST: Primary | ICD-10-CM

## 2019-12-23 DIAGNOSIS — Z01.419 ENCOUNTER FOR GYNECOLOGICAL EXAMINATION WITH PAPANICOLAOU SMEAR OF CERVIX: ICD-10-CM

## 2019-12-23 PROCEDURE — 87624 HPV HI-RISK TYP POOLED RSLT: CPT | Performed by: NURSE PRACTITIONER

## 2019-12-23 PROCEDURE — 99396 PREV VISIT EST AGE 40-64: CPT | Performed by: NURSE PRACTITIONER

## 2019-12-23 PROCEDURE — G0145 SCR C/V CYTO,THINLAYER,RESCR: HCPCS | Performed by: NURSE PRACTITIONER

## 2019-12-23 NOTE — PROGRESS NOTES
Assessment/Plan:    Pap/pelvic completed, co-testing done  CBE done today, will be scheduling mammogram     Follow up with me as needed and with Dr Darryl Barnes as scheduled, labs prior  Diagnoses and all orders for this visit:    Screening for malignant neoplasm of breast  -     Liquid-based pap, screening  -     Mammo screening bilateral w 3d & cad; Future    Encounter for gynecological examination with Papanicolaou smear of cervix  -     Liquid-based pap, screening  -     Mammo screening bilateral w 3d & cad; Future    The patient was counseled regarding instructions for management, risk factor reductions, patient and family education,impressions, risks and benefits of treatment options, side effects of medications, importance of compliance with treatment  The treatment plan was reviewed with the patient/guardian and patient/guardian understands and agrees with the treatment plan  Current Outpatient Medications:     ascorbic acid (VITAMIN C) 250 mg tablet, Take 1 twice a day with iron, Disp: 100 tablet, Rfl: 2    Cholecalciferol 68412 units capsule, Take 1 cap po weekly, Disp: 12 capsule, Rfl: 3    ferrous sulfate 324 (65 Fe) mg, Take 1 twice a day with vitamin-C, Disp: 100 tablet, Rfl: 2    levothyroxine 100 mcg tablet, , Disp: , Rfl:     albuterol (2 5 mg/3 mL) 0 083 % nebulizer solution, Inhale, Disp: , Rfl:     Naltrexone-buPROPion HCl ER 8-90 MG TB12, 1 tab daily x 7 days, 1 tab twice daily x 7 days, 2 tabs in AM and 1 tab in PM x 7 days, then 2 tabs twice daily (Patient not taking: Reported on 12/23/2019), Disp: 120 tablet, Rfl: 0    Subjective:      Patient ID: Max Faith is a 38 y o  female  Here for pap/pelvic  No complaints today  Last pap was three years ago and normal     The following portions of the patient's history were reviewed and updated as appropriate:   She has a past medical history of Bell's palsy, Diabetes mellitus during pregnancy, and Migraine  ,  does not have any pertinent problems on file  ,   has a past surgical history that includes  section; Hysteroscopy; US guided thyroid biopsy (2019); and US guided thyroid biopsy (2019)  ,  family history includes Cervical cancer in her maternal aunt; Heart disease in her father; Hyperlipidemia in her father; Hypertension in her father; Migraines in her mother  ,   reports that she has never smoked  She has never used smokeless tobacco  She reports that she drinks alcohol  She reports that she does not use drugs  ,  is allergic to aspirin and iodinated diagnostic agents       Review of Systems   Constitutional: Negative  Respiratory: Negative  Cardiovascular: Negative  Musculoskeletal: Negative  Psychiatric/Behavioral: Negative            Objective:  /80 (BP Location: Left arm, Patient Position: Sitting, Cuff Size: Large)   Pulse 100   Temp 98 4 °F (36 9 °C) (Tympanic)   Resp 18   Ht 5' 6" (1 676 m)   Wt 116 kg (256 lb 12 8 oz)   SpO2 100%   BMI 41 45 kg/m²     Lab Review  Appointment on 2019   Component Date Value    TSH 3RD GENERATON 2019 0 898     T3, Free 2019 2 70     Free T4 2019 1 14     Vit D, 25-Hydroxy 2019 34 5     WBC 2019 5 28     RBC 2019 4 09     Hemoglobin 2019 11 2*    Hematocrit 2019 36 7     MCV 2019 90     MCH 2019 27 4     MCHC 2019 30 5*    RDW 2019 13 7     MPV 2019 12 8*    Platelets 15/44/0722 220     nRBC 2019 0     Neutrophils Relative 2019 62     Immat GRANS % 2019 0     Lymphocytes Relative 2019 27     Monocytes Relative 2019 6     Eosinophils Relative 2019 5     Basophils Relative 2019 0     Neutrophils Absolute 2019 3 29     Immature Grans Absolute 2019 0 01     Lymphocytes Absolute 2019 1 43     Monocytes Absolute 2019 0 29     Eosinophils Absolute 2019 0 24     Basophils Absolute 11/18/2019 0 02     Hemoglobin A1C 11/18/2019 6 4*    EAG 11/18/2019 137     Iron Saturation 11/18/2019 8     TIBC 11/18/2019 462*    Iron 11/18/2019 35*    Ferritin 11/18/2019 5*        Imaging: No results found  Physical Exam   Pulmonary/Chest: Right breast exhibits no inverted nipple, no mass, no nipple discharge, no skin change and no tenderness  Left breast exhibits no inverted nipple, no mass, no nipple discharge, no skin change and no tenderness  No breast tenderness or discharge  Breasts are symmetrical    Genitourinary: Uterus normal  No breast tenderness or discharge  There is no rash, tenderness or lesion on the right labia  There is no rash, tenderness or lesion on the left labia  Cervix exhibits no discharge and no friability  Right adnexum displays no mass and no tenderness  Left adnexum displays no mass and no tenderness  No erythema, tenderness or bleeding in the vagina  No vaginal discharge found  Lymphadenopathy:        Right axillary: No pectoral and no lateral adenopathy present  Left axillary: No pectoral and no lateral adenopathy present

## 2019-12-23 NOTE — PATIENT INSTRUCTIONS
Pap/pelvic completed, co-testing done  CBE done today, will be scheduling mammogram     Follow up with me as needed and with Dr Rishabh Emerson as scheduled, labs prior

## 2019-12-26 LAB
HPV HR 12 DNA CVX QL NAA+PROBE: NEGATIVE
HPV16 DNA CVX QL NAA+PROBE: NEGATIVE
HPV18 DNA CVX QL NAA+PROBE: NEGATIVE

## 2019-12-30 ENCOUNTER — TELEPHONE (OUTPATIENT)
Dept: INTERNAL MEDICINE CLINIC | Facility: CLINIC | Age: 44
End: 2019-12-30

## 2019-12-30 LAB
LAB AP GYN PRIMARY INTERPRETATION: NORMAL
Lab: NORMAL

## 2020-01-28 RX ORDER — LEVOTHYROXINE SODIUM 0.03 MG/1
TABLET ORAL
Qty: 45 TABLET | Refills: 2 | Status: SHIPPED | OUTPATIENT
Start: 2020-01-28 | End: 2020-12-28 | Stop reason: ALTCHOICE

## 2020-02-26 ENCOUNTER — TELEPHONE (OUTPATIENT)
Dept: PHYSICAL THERAPY | Facility: OTHER | Age: 45
End: 2020-02-26

## 2020-02-26 ENCOUNTER — HOSPITAL ENCOUNTER (EMERGENCY)
Facility: HOSPITAL | Age: 45
Discharge: HOME | End: 2020-02-26
Attending: EMERGENCY MEDICINE
Payer: COMMERCIAL

## 2020-02-26 VITALS
DIASTOLIC BLOOD PRESSURE: 90 MMHG | RESPIRATION RATE: 20 BRPM | HEART RATE: 83 BPM | SYSTOLIC BLOOD PRESSURE: 151 MMHG | WEIGHT: 265 LBS | BODY MASS INDEX: 42.77 KG/M2 | TEMPERATURE: 98 F | OXYGEN SATURATION: 99 %

## 2020-02-26 DIAGNOSIS — M54.16 LUMBAR RADICULOPATHY: Primary | ICD-10-CM

## 2020-02-26 PROCEDURE — 99283 EMERGENCY DEPT VISIT LOW MDM: CPT

## 2020-02-26 PROCEDURE — 96374 THER/PROPH/DIAG INJ IV PUSH: CPT

## 2020-02-26 PROCEDURE — 99284 EMERGENCY DEPT VISIT MOD MDM: CPT | Performed by: PHYSICIAN ASSISTANT

## 2020-02-26 PROCEDURE — 96375 TX/PRO/DX INJ NEW DRUG ADDON: CPT

## 2020-02-26 PROCEDURE — 96376 TX/PRO/DX INJ SAME DRUG ADON: CPT

## 2020-02-26 RX ORDER — HYDROMORPHONE HCL/PF 1 MG/ML
1 SYRINGE (ML) INJECTION ONCE
Status: COMPLETED | OUTPATIENT
Start: 2020-02-26 | End: 2020-02-26

## 2020-02-26 RX ORDER — OXYCODONE HYDROCHLORIDE AND ACETAMINOPHEN 5; 325 MG/1; MG/1
1 TABLET ORAL EVERY 4 HOURS PRN
Qty: 12 TABLET | Refills: 0 | Status: SHIPPED | OUTPATIENT
Start: 2020-02-26 | End: 2020-03-07

## 2020-02-26 RX ORDER — PREDNISONE 20 MG/1
40 TABLET ORAL ONCE
Status: COMPLETED | OUTPATIENT
Start: 2020-02-26 | End: 2020-02-26

## 2020-02-26 RX ORDER — DIAZEPAM 5 MG/ML
5 INJECTION, SOLUTION INTRAMUSCULAR; INTRAVENOUS ONCE
Status: COMPLETED | OUTPATIENT
Start: 2020-02-26 | End: 2020-02-26

## 2020-02-26 RX ORDER — PREDNISONE 20 MG/1
40 TABLET ORAL DAILY
Qty: 10 TABLET | Refills: 0 | Status: SHIPPED | OUTPATIENT
Start: 2020-02-26 | End: 2020-03-02

## 2020-02-26 RX ORDER — ONDANSETRON 2 MG/ML
4 INJECTION INTRAMUSCULAR; INTRAVENOUS ONCE
Status: COMPLETED | OUTPATIENT
Start: 2020-02-26 | End: 2020-02-26

## 2020-02-26 RX ORDER — KETOROLAC TROMETHAMINE 30 MG/ML
15 INJECTION, SOLUTION INTRAMUSCULAR; INTRAVENOUS ONCE
Status: COMPLETED | OUTPATIENT
Start: 2020-02-26 | End: 2020-02-26

## 2020-02-26 RX ORDER — CYCLOBENZAPRINE HCL 10 MG
10 TABLET ORAL 2 TIMES DAILY PRN
Qty: 20 TABLET | Refills: 0 | Status: SHIPPED | OUTPATIENT
Start: 2020-02-26 | End: 2020-12-28 | Stop reason: ALTCHOICE

## 2020-02-26 RX ADMIN — DIAZEPAM 5 MG: 10 INJECTION, SOLUTION INTRAMUSCULAR; INTRAVENOUS at 04:50

## 2020-02-26 RX ADMIN — ONDANSETRON 4 MG: 2 INJECTION INTRAMUSCULAR; INTRAVENOUS at 04:27

## 2020-02-26 RX ADMIN — HYDROMORPHONE HYDROCHLORIDE 1 MG: 1 INJECTION, SOLUTION INTRAMUSCULAR; INTRAVENOUS; SUBCUTANEOUS at 05:12

## 2020-02-26 RX ADMIN — PREDNISONE 40 MG: 20 TABLET ORAL at 04:53

## 2020-02-26 RX ADMIN — HYDROMORPHONE HYDROCHLORIDE 1 MG: 1 INJECTION, SOLUTION INTRAMUSCULAR; INTRAVENOUS; SUBCUTANEOUS at 04:28

## 2020-02-26 RX ADMIN — KETOROLAC TROMETHAMINE 15 MG: 30 INJECTION, SOLUTION INTRAMUSCULAR at 04:27

## 2020-02-26 NOTE — ED PROVIDER NOTES
History  Chief Complaint   Patient presents with    Back Pain     Per PT " I've been having generalized weakness for a few days  Last night the back and leg pain has gotten worse "     Leg Pain     Patient is a 52-year-old female presents emergency department with complaints of low back pain radiating down bilateral lower extremities, right greater than left  Patient states symptoms began about 3 days ago and got progressively worse  She denies any numbness, tingling, saddle paresthesia, bowel or bladder incontinence  She denies any injury to her lumbar spine  Prior to Admission Medications   Prescriptions Last Dose Informant Patient Reported? Taking? Cholecalciferol 62177 units capsule   No No   Sig: Take 1 cap po weekly   albuterol (2 5 mg/3 mL) 0 083 % nebulizer solution   Yes No   Sig: Inhale   ascorbic acid (VITAMIN C) 250 mg tablet   No No   Sig: Take 1 twice a day with iron   ferrous sulfate 324 (65 Fe) mg   No No   Sig: Take 1 twice a day with vitamin-C   levothyroxine 100 mcg tablet   Yes No   levothyroxine 25 mcg tablet   No No   Sig: Take 1 5 tabs po QAM, one hour prior to breakfast       Facility-Administered Medications: None       Past Medical History:   Diagnosis Date    Bell's palsy     Diabetes mellitus during pregnancy     Hashimoto's disease     Migraine        Past Surgical History:   Procedure Laterality Date     SECTION      HYSTEROSCOPY      with resection of intrauterine septum    US GUIDED THYROID BIOPSY  2019    US GUIDED THYROID BIOPSY  2019       Family History   Problem Relation Age of Onset    Migraines Mother     Heart disease Father         cardiac disorder    Hyperlipidemia Father     Hypertension Father     Cervical cancer Maternal Aunt      I have reviewed and agree with the history as documented      E-Cigarette/Vaping    E-Cigarette Use Never User      E-Cigarette/Vaping Substances    Nicotine No     THC No     CBD No     Flavoring No     Other No     Unknown No      Social History     Tobacco Use    Smoking status: Never Smoker    Smokeless tobacco: Never Used   Substance Use Topics    Alcohol use: Yes    Drug use: No       Review of Systems   Constitutional: Negative for fever  Respiratory: Negative for shortness of breath  Cardiovascular: Negative for chest pain  Musculoskeletal: Positive for back pain  Neurological: Negative for numbness  All other systems reviewed and are negative  Physical Exam  Physical Exam   Constitutional: She is oriented to person, place, and time  She appears well-developed and well-nourished  HENT:   Head: Normocephalic and atraumatic  Right Ear: External ear normal    Left Ear: External ear normal    Nose: Nose normal    Mouth/Throat: Oropharynx is clear and moist    Eyes: Pupils are equal, round, and reactive to light  Conjunctivae and EOM are normal    Neck: Normal range of motion  Cardiovascular: Normal rate, regular rhythm and normal heart sounds  Pulmonary/Chest: Effort normal and breath sounds normal    Abdominal: Soft  Bowel sounds are normal    Musculoskeletal:        Lumbar back: She exhibits decreased range of motion, tenderness, pain and spasm  Positive straight leg raise bilaterally  Sensation light touch intact in all dermatomes  Motor strength is 5/5 and symmetric  Neurological: She is alert and oriented to person, place, and time  Skin: Skin is warm  Capillary refill takes less than 2 seconds  Psychiatric: She has a normal mood and affect  Her behavior is normal  Judgment and thought content normal    Vitals reviewed        Vital Signs  ED Triage Vitals [02/26/20 0357]   Temperature Pulse Respirations Blood Pressure SpO2   98 °F (36 7 °C) (!) 124 (!) 30 (!) 188/96 99 %      Temp Source Heart Rate Source Patient Position - Orthostatic VS BP Location FiO2 (%)   Oral Monitor Sitting Right arm --      Pain Score       Worst Possible Pain Vitals:    02/26/20 0357 02/26/20 0359 02/26/20 0430 02/26/20 0515   BP: (!) 188/96  161/96 151/90   Pulse: (!) 124 94 85 83   Patient Position - Orthostatic VS: Sitting            Visual Acuity      ED Medications  Medications   HYDROmorphone (DILAUDID) injection 1 mg (1 mg Intravenous Given 2/26/20 0428)   diazepam (VALIUM) injection 5 mg (5 mg Intravenous Given 2/26/20 0450)   ketorolac (TORADOL) injection 15 mg (15 mg Intravenous Given 2/26/20 0427)   ondansetron (ZOFRAN) injection 4 mg (4 mg Intravenous Given 2/26/20 0427)   predniSONE tablet 40 mg (40 mg Oral Given 2/26/20 0453)   HYDROmorphone (DILAUDID) injection 1 mg (1 mg Intravenous Given 2/26/20 5548)       Diagnostic Studies  Results Reviewed     None                 No orders to display              Procedures  Procedures         ED Course                               MDM  Number of Diagnoses or Management Options  Lumbar radiculopathy:   Diagnosis management comments: Patient is a 52-year-old female that presents emergency department with complaints of low back pain radiating down bilateral lower extremities, right greater than left  Motor strength bilateral lower extremities is intact and symmetric  Sensation light touch intact in all dermatomes  There is no evidence of cauda equina syndrome  Patient was given pain medication, muscle relaxant, prednisone to help control her pain  Pain control was established  Prescription for prednisone, Flexeril, Percocet was given  Referral to [facility] was given  Return parameters were discussed  Patient stable for discharge      Risk of Complications, Morbidity, and/or Mortality  Presenting problems: moderate  Diagnostic procedures: low  Management options: moderate    Patient Progress  Patient progress: stable        Disposition  Final diagnoses:   Lumbar radiculopathy     Time reflects when diagnosis was documented in both MDM as applicable and the Disposition within this note     Time User Action Codes Description Comment    2/26/2020  5:37 AM Fieldale Julieta Add [P97 50] Lumbar radiculopathy       ED Disposition     ED Disposition Condition Date/Time Comment    Discharge Good Wed Feb 26, 2020  5:36 AM Tom Urena discharge to home/self care  Follow-up Information     Follow up With Specialties Details Why [address] [facility], DO Internal Medicine   [address]  [address] [place] [address]  [place] [address]  [phone]            Discharge Medication List as of 2/26/2020  5:41 AM      START taking these medications    Details   cyclobenzaprine (FLEXERIL) 10 mg tablet Take 1 tablet (10 mg total) by mouth 2 (two) times a day as needed for muscle spasms, Starting Wed 2/26/2020, Normal      oxyCODONE-acetaminophen (PERCOCET) 5-325 mg per tablet Take 1 tablet by mouth every 4 (four) hours as needed for moderate pain for up to 10 daysMax Daily Amount: 6 tablets, Starting Wed 2/26/2020, Until Sat 3/7/2020, Normal      predniSONE 20 mg tablet Take 2 tablets (40 mg total) by mouth daily for 5 days, Starting Wed 2/26/2020, Until Mon 3/2/2020, Normal         CONTINUE these medications which have NOT CHANGED    Details   albuterol (2 5 mg/3 mL) 0 083 % nebulizer solution Inhale, Historical Med      ascorbic acid (VITAMIN C) 250 mg tablet Take 1 twice a day with iron, Normal      Cholecalciferol 07982 units capsule Take 1 cap po weekly, Normal      ferrous sulfate 324 (65 Fe) mg Take 1 twice a day with vitamin-C, Normal      !! levothyroxine 100 mcg tablet Starting Fri 11/15/2019, Historical Med      !! levothyroxine 25 mcg tablet Take 1 5 tabs po QAM, one hour prior to breakfast , Normal       !! - Potential duplicate medications found  Please discuss with provider              PDMP Review     None          ED Provider  Electronically Signed by           Susan Brady PA-C  02/26/20 6304

## 2020-02-26 NOTE — TELEPHONE ENCOUNTER
Voice mail/message left for patient to return call to [place] [id number] program including our hours of business and phone number  First attempt to contact patient regarding referral  Deferred per protocol for f/u

## 2020-02-27 ENCOUNTER — TELEPHONE (OUTPATIENT)
Dept: PHYSICAL THERAPY | Facility: OTHER | Age: 45
End: 2020-02-27

## 2020-02-27 NOTE — TELEPHONE ENCOUNTER
Patient LM/returned our call after office hours yesterday  Nurse reached out this morning and discussed [facility] and offering of PT evaluation  Patient stated she is interested, but does not have time to triage at this time  She will call back when "available to talk more"  Nurse agreed and reminded to LM with full name, , and one of the nurses will call ASAP  Agreed and thankful for call back  Will await pts call      Referral closed per protocol

## 2020-04-02 ENCOUNTER — TELEPHONE (OUTPATIENT)
Dept: INTERNAL MEDICINE CLINIC | Facility: CLINIC | Age: 45
End: 2020-04-02

## 2020-05-12 DIAGNOSIS — E55.9 VITAMIN D DEFICIENCY: ICD-10-CM

## 2020-05-14 RX ORDER — CHOLECALCIFEROL (VITAMIN D3) 1250 MCG
CAPSULE ORAL
Qty: 12 CAPSULE | Refills: 3 | Status: SHIPPED | OUTPATIENT
Start: 2020-05-14

## 2020-10-21 ENCOUNTER — TELEMEDICINE (OUTPATIENT)
Dept: FAMILY MEDICINE CLINIC | Facility: CLINIC | Age: 45
End: 2020-10-21
Payer: COMMERCIAL

## 2020-10-21 DIAGNOSIS — Z20.822 SUSPECTED COVID-19 VIRUS INFECTION: ICD-10-CM

## 2020-10-21 DIAGNOSIS — Z20.822 SUSPECTED COVID-19 VIRUS INFECTION: Primary | ICD-10-CM

## 2020-10-21 PROCEDURE — 99214 OFFICE O/P EST MOD 30 MIN: CPT | Performed by: FAMILY MEDICINE

## 2020-10-21 PROCEDURE — 1036F TOBACCO NON-USER: CPT | Performed by: FAMILY MEDICINE

## 2020-10-21 PROCEDURE — U0003 INFECTIOUS AGENT DETECTION BY NUCLEIC ACID (DNA OR RNA); SEVERE ACUTE RESPIRATORY SYNDROME CORONAVIRUS 2 (SARS-COV-2) (CORONAVIRUS DISEASE [COVID-19]), AMPLIFIED PROBE TECHNIQUE, MAKING USE OF HIGH THROUGHPUT TECHNOLOGIES AS DESCRIBED BY CMS-2020-01-R: HCPCS | Performed by: FAMILY MEDICINE

## 2020-10-22 LAB — SARS-COV-2 RNA SPEC QL NAA+PROBE: DETECTED

## 2020-10-23 ENCOUNTER — TELEPHONE (OUTPATIENT)
Dept: FAMILY MEDICINE CLINIC | Facility: CLINIC | Age: 45
End: 2020-10-23

## 2020-10-23 ENCOUNTER — APPOINTMENT (OUTPATIENT)
Dept: RADIOLOGY | Facility: CLINIC | Age: 45
End: 2020-10-23
Payer: COMMERCIAL

## 2020-10-23 ENCOUNTER — OFFICE VISIT (OUTPATIENT)
Dept: URGENT CARE | Facility: CLINIC | Age: 45
End: 2020-10-23
Payer: COMMERCIAL

## 2020-10-23 VITALS — TEMPERATURE: 97.6 F | HEART RATE: 118 BPM | OXYGEN SATURATION: 100 %

## 2020-10-23 DIAGNOSIS — U07.1 COVID-19: ICD-10-CM

## 2020-10-23 DIAGNOSIS — U07.1 COVID-19: Primary | ICD-10-CM

## 2020-10-23 PROCEDURE — 99214 OFFICE O/P EST MOD 30 MIN: CPT | Performed by: PHYSICIAN ASSISTANT

## 2020-10-23 PROCEDURE — 71046 X-RAY EXAM CHEST 2 VIEWS: CPT

## 2020-10-23 RX ORDER — BENZONATATE 100 MG/1
100 CAPSULE ORAL 3 TIMES DAILY PRN
Qty: 20 CAPSULE | Refills: 0 | Status: SHIPPED | OUTPATIENT
Start: 2020-10-23 | End: 2020-12-28 | Stop reason: ALTCHOICE

## 2020-10-23 RX ORDER — PREDNISONE 10 MG/1
TABLET ORAL
Qty: 18 TABLET | Refills: 0 | Status: SHIPPED | OUTPATIENT
Start: 2020-10-23 | End: 2020-12-28 | Stop reason: ALTCHOICE

## 2020-10-29 ENCOUNTER — TELEMEDICINE (OUTPATIENT)
Dept: FAMILY MEDICINE CLINIC | Facility: CLINIC | Age: 45
End: 2020-10-29
Payer: COMMERCIAL

## 2020-10-29 VITALS — BODY MASS INDEX: 41.78 KG/M2 | HEIGHT: 66 IN | WEIGHT: 260 LBS

## 2020-10-29 DIAGNOSIS — U07.1 COVID-19: Primary | ICD-10-CM

## 2020-10-29 PROCEDURE — 3008F BODY MASS INDEX DOCD: CPT | Performed by: FAMILY MEDICINE

## 2020-10-29 PROCEDURE — 3725F SCREEN DEPRESSION PERFORMED: CPT | Performed by: FAMILY MEDICINE

## 2020-10-29 PROCEDURE — 99212 OFFICE O/P EST SF 10 MIN: CPT | Performed by: FAMILY MEDICINE

## 2020-12-28 ENCOUNTER — OFFICE VISIT (OUTPATIENT)
Dept: OBGYN CLINIC | Facility: MEDICAL CENTER | Age: 45
End: 2020-12-28
Payer: COMMERCIAL

## 2020-12-28 VITALS — BODY MASS INDEX: 44.06 KG/M2 | DIASTOLIC BLOOD PRESSURE: 80 MMHG | WEIGHT: 273 LBS | SYSTOLIC BLOOD PRESSURE: 134 MMHG

## 2020-12-28 DIAGNOSIS — B36.9 FUNGAL DERMATITIS: Primary | ICD-10-CM

## 2020-12-28 PROBLEM — Z12.39 SCREENING FOR MALIGNANT NEOPLASM OF BREAST: Status: RESOLVED | Noted: 2019-12-23 | Resolved: 2020-12-28

## 2020-12-28 PROBLEM — Z01.419 ENCOUNTER FOR GYNECOLOGICAL EXAMINATION WITH PAPANICOLAOU SMEAR OF CERVIX: Status: RESOLVED | Noted: 2019-12-23 | Resolved: 2020-12-28

## 2020-12-28 PROCEDURE — 99202 OFFICE O/P NEW SF 15 MIN: CPT | Performed by: PHYSICIAN ASSISTANT

## 2021-01-08 ENCOUNTER — TELEMEDICINE (OUTPATIENT)
Dept: INTERNAL MEDICINE CLINIC | Facility: CLINIC | Age: 46
End: 2021-01-08
Payer: COMMERCIAL

## 2021-01-08 VITALS — TEMPERATURE: 98 F | BODY MASS INDEX: 43.87 KG/M2 | WEIGHT: 273 LBS | HEIGHT: 66 IN

## 2021-01-08 DIAGNOSIS — J45.20 MILD INTERMITTENT ASTHMA WITHOUT COMPLICATION: Primary | ICD-10-CM

## 2021-01-08 PROCEDURE — 1036F TOBACCO NON-USER: CPT | Performed by: INTERNAL MEDICINE

## 2021-01-08 PROCEDURE — 99214 OFFICE O/P EST MOD 30 MIN: CPT | Performed by: INTERNAL MEDICINE

## 2021-01-08 PROCEDURE — 3008F BODY MASS INDEX DOCD: CPT | Performed by: INTERNAL MEDICINE

## 2021-01-08 RX ORDER — ALBUTEROL SULFATE 90 UG/1
2 AEROSOL, METERED RESPIRATORY (INHALATION) EVERY 6 HOURS PRN
Qty: 1 INHALER | Refills: 0 | Status: SHIPPED | OUTPATIENT
Start: 2021-01-08

## 2021-01-08 RX ORDER — ALBUTEROL SULFATE 2.5 MG/3ML
2.5 SOLUTION RESPIRATORY (INHALATION) EVERY 4 HOURS PRN
Qty: 5 VIAL | Refills: 1 | Status: SHIPPED | OUTPATIENT
Start: 2021-01-08

## 2021-01-08 RX ORDER — ALBUTEROL SULFATE 2.5 MG/3ML
2.5 SOLUTION RESPIRATORY (INHALATION) EVERY 4 HOURS PRN
Qty: 5 VIAL | Refills: 1 | Status: SHIPPED | OUTPATIENT
Start: 2021-01-08 | End: 2021-01-08 | Stop reason: SDUPTHER

## 2021-01-08 RX ORDER — METHYLPREDNISOLONE 4 MG/1
TABLET ORAL
Qty: 21 EACH | Refills: 0 | Status: SHIPPED | OUTPATIENT
Start: 2021-01-08 | End: 2021-04-28 | Stop reason: ALTCHOICE

## 2021-01-08 RX ORDER — AZITHROMYCIN 250 MG/1
TABLET, FILM COATED ORAL
Qty: 6 TABLET | Refills: 0 | Status: SHIPPED | OUTPATIENT
Start: 2021-01-08 | End: 2021-01-13

## 2021-01-08 NOTE — PROGRESS NOTES
Virtual Regular Visit      Assessment/Plan:    Problem List Items Addressed This Visit        Respiratory    Asthma - Primary    Relevant Medications    methylPREDNISolone 4 MG tablet therapy pack    azithromycin (Zithromax) 250 mg tablet    albuterol (Ventolin HFA) 90 mcg/act inhaler    albuterol (2 5 mg/3 mL) 0 083 % nebulizer solution        If S/S worsen, will order CXR   BMI Counseling: Body mass index is 44 06 kg/m²  The BMI is above normal  Nutrition recommendations include decreasing portion sizes  Exercise recommendations include moderate physical activity 150 minutes/week  No pharmacotherapy was ordered  Reason for visit is   Chief Complaint   Patient presents with    Cough    Asthma    Virtual Regular Visit        Encounter provider Luz Marina Ignacio MD    Provider located at [address] [facility]  [address]  [facility] [address]  [address] [facility] [address] [phone]      Recent Visits  No visits were found meeting these conditions  Showing recent visits within past 7 days and meeting all other requirements     Today's Visits  Date Type Provider Dept   01/08/21 Artis Rico MD [facility] Internal Med [address] today's visits and meeting all other requirements     Future Appointments  No visits were found meeting these conditions  Showing future appointments within next 150 days and meeting all other requirements        The patient was identified by name and date of birth  Arabella Mccarthy was informed that this is a telemedicine visit and that the visit is being conducted through [facility] [place] and patient was informed that this is not a secure, HIPAA-compliant platform  She agrees to proceed     My office door was closed  No one else was in the room  She acknowledged consent and understanding of privacy and security of the video platform   The patient has agreed to participate and understands they can discontinue the visit at any time     Patient is aware this is a billable service  Subjective  Jossy Casillas is a 41 y o  female presents for a virtual visit with cough and asthma  URI symptoms- Patient has a h/o asthma which has been controlled for years  She started experiencing slight sob and wheezes yesterday  Denies sick contact or covid exposure  She did have covid last October  States she had some trouble sleeping overnight  Reports non productive cough  Has not tried any treatment as she has run out of her medications  Episode has been triggered by URI  Past Medical History:   Diagnosis Date    Bell's palsy     Diabetes mellitus during pregnancy     Hashimoto's disease     Migraine        Past Surgical History:   Procedure Laterality Date     SECTION      HYSTEROSCOPY      with resection of intrauterine septum    US GUIDED THYROID BIOPSY  2019    US GUIDED THYROID BIOPSY  2019       Current Outpatient Medications   Medication Sig Dispense Refill    albuterol (2 5 mg/3 mL) 0 083 % nebulizer solution Take 1 vial (2 5 mg total) by nebulization every 4 (four) hours as needed for wheezing or shortness of breath 5 vial 1    Cholecalciferol (VITAMIN D3) 1 25 MG (12617 UT) CAPS TAKE ONE CAPSULE BY MOUTH WEEKLY 12 capsule 3    econazole nitrate 1 % cream Apply topically daily 30 g 0    ferrous sulfate 324 (65 Fe) mg Take 1 twice a day with vitamin-C 100 tablet 2    levothyroxine 100 mcg tablet       albuterol (Ventolin HFA) 90 mcg/act inhaler Inhale 2 puffs every 6 (six) hours as needed for wheezing or shortness of breath 1 Inhaler 0    azithromycin (Zithromax) 250 mg tablet Take 2 tablets (500 mg total) by mouth daily for 1 day, THEN 1 tablet (250 mg total) daily for 4 days  6 tablet 0    methylPREDNISolone 4 MG tablet therapy pack Use as directed on package 21 each 0     No current facility-administered medications for this visit           Allergies   Allergen Reactions    Aspirin     Iodinated Diagnostic Agents        Review of Systems   Constitutional: Negative for fatigue and fever  HENT: Positive for congestion, postnasal drip and rhinorrhea  Eyes: Negative for visual disturbance  Respiratory: Positive for cough and chest tightness  Negative for shortness of breath  Endocrine: Negative  Allergic/Immunologic: Negative  Neurological: Negative  Hematological: Negative  Psychiatric/Behavioral: Negative  Video Exam    Vitals:    01/08/21 1252   Temp: 98 °F (36 7 °C)   TempSrc: Tympanic   Weight: 124 kg (273 lb)   Height: 5' 6" (1 676 m)       Physical Exam  Constitutional:       Appearance: Normal appearance  She is not toxic-appearing  Pulmonary:      Effort: Pulmonary effort is normal    Neurological:      Mental Status: She is alert and oriented to person, place, and time  Psychiatric:         Mood and Affect: Mood normal           I spent 15 minutes directly with the patient during this visit      VIRTUAL VISIT DISCLAIMER    Jemma Bhat acknowledges that she has consented to an online visit or consultation  She understands that the online visit is based solely on information provided by her, and that, in the absence of a face-to-face physical evaluation by the physician, the diagnosis she receives is both limited and provisional in terms of accuracy and completeness  This is not intended to replace a full medical face-to-face evaluation by the physician  Jemma Bhat understands and accepts these terms

## 2021-03-10 DIAGNOSIS — Z23 ENCOUNTER FOR IMMUNIZATION: ICD-10-CM

## 2021-03-15 DIAGNOSIS — E11.9 TYPE 2 DIABETES MELLITUS WITHOUT COMPLICATION, WITHOUT LONG-TERM CURRENT USE OF INSULIN (HCC): Primary | ICD-10-CM

## 2021-03-15 DIAGNOSIS — E55.9 VITAMIN D DEFICIENCY: ICD-10-CM

## 2021-03-15 DIAGNOSIS — E06.3 HASHIMOTO'S THYROIDITIS: ICD-10-CM

## 2021-03-15 DIAGNOSIS — E66.01 OBESITY, MORBID (HCC): ICD-10-CM

## 2021-03-15 DIAGNOSIS — E78.1 HYPERTRIGLYCERIDEMIA: ICD-10-CM

## 2021-03-15 DIAGNOSIS — D50.0 IRON DEFICIENCY ANEMIA DUE TO CHRONIC BLOOD LOSS: ICD-10-CM

## 2021-03-15 DIAGNOSIS — E03.9 HYPOTHYROIDISM, UNSPECIFIED TYPE: ICD-10-CM

## 2021-03-15 DIAGNOSIS — E06.3 HASHIMOTO'S THYROIDITIS: Primary | ICD-10-CM

## 2021-03-15 RX ORDER — LEVOTHYROXINE SODIUM 0.1 MG/1
100 TABLET ORAL DAILY
Qty: 30 TABLET | Refills: 5 | OUTPATIENT
Start: 2021-03-15

## 2021-03-15 RX ORDER — LEVOTHYROXINE SODIUM 0.1 MG/1
100 TABLET ORAL DAILY
Qty: 30 TABLET | Refills: 0 | Status: SHIPPED | OUTPATIENT
Start: 2021-03-15 | End: 2021-04-28 | Stop reason: SDUPTHER

## 2021-03-15 NOTE — TELEPHONE ENCOUNTER
Pt aware will have labs done tomorrow Has not been able to go due to work schedule   Only has two tabs left

## 2021-03-17 ENCOUNTER — APPOINTMENT (OUTPATIENT)
Dept: LAB | Facility: HOSPITAL | Age: 46
End: 2021-03-17
Payer: COMMERCIAL

## 2021-03-17 DIAGNOSIS — E06.3 HASHIMOTO'S THYROIDITIS: ICD-10-CM

## 2021-03-17 LAB — TSH SERPL DL<=0.05 MIU/L-ACNC: 3.99 UIU/ML (ref 0.36–3.74)

## 2021-03-17 PROCEDURE — 84443 ASSAY THYROID STIM HORMONE: CPT

## 2021-03-17 PROCEDURE — 36415 COLL VENOUS BLD VENIPUNCTURE: CPT

## 2021-03-22 ENCOUNTER — OFFICE VISIT (OUTPATIENT)
Dept: OBGYN CLINIC | Facility: MEDICAL CENTER | Age: 46
End: 2021-03-22
Payer: COMMERCIAL

## 2021-03-22 VITALS
DIASTOLIC BLOOD PRESSURE: 64 MMHG | BODY MASS INDEX: 45.13 KG/M2 | HEIGHT: 66 IN | WEIGHT: 280.8 LBS | SYSTOLIC BLOOD PRESSURE: 128 MMHG

## 2021-03-22 DIAGNOSIS — N90.7 VULVAR CYST: Primary | ICD-10-CM

## 2021-03-22 PROCEDURE — 1036F TOBACCO NON-USER: CPT | Performed by: PHYSICIAN ASSISTANT

## 2021-03-22 PROCEDURE — 99213 OFFICE O/P EST LOW 20 MIN: CPT | Performed by: PHYSICIAN ASSISTANT

## 2021-03-22 PROCEDURE — 3008F BODY MASS INDEX DOCD: CPT | Performed by: PHYSICIAN ASSISTANT

## 2021-03-22 NOTE — PROGRESS NOTES
Max Faith  1975    S:  40 y o  female here for a problem visit  Two days ago she started with a painful vulvar bump  This is the third occurrence in the same location over the past few years  In the past it has eventually become larger until it opens and drains  She noticed some bleeding when she wiped the area this weekend but it is not currently draining  Past Medical History:   Diagnosis Date    Bell's palsy     COVID-19 10/20/2020    Diabetes mellitus during pregnancy     Hashimoto's disease     Migraine      Family History   Problem Relation Age of Onset    Migraines Mother     Heart disease Father         cardiac disorder    Hyperlipidemia Father     Hypertension Father     Cervical cancer Maternal Aunt      Social History     Socioeconomic History    Marital status: /Civil Union     Spouse name: None    Number of children: 2    Years of education: None    Highest education level: None   Occupational History    Occupation: employed     Comment: full-time   Social Needs    Financial resource strain: None    Food insecurity     Worry: None     Inability: None    Transportation needs     Medical: None     Non-medical: None   Tobacco Use    Smoking status: Never Smoker    Smokeless tobacco: Never Used   Substance and Sexual Activity    Alcohol use:  Yes    Drug use: No    Sexual activity: Yes     Partners: Male     Birth control/protection: Male Sterilization   Lifestyle    Physical activity     Days per week: None     Minutes per session: None    Stress: None   Relationships    Social connections     Talks on phone: None     Gets together: None     Attends Jain service: None     Active member of club or organization: None     Attends meetings of clubs or organizations: None     Relationship status: None    Intimate partner violence     Fear of current or ex partner: None     Emotionally abused: None     Physically abused: None     Forced sexual activity: None   Other Topics Concern    None   Social History Narrative    Always uses a seatbelt    Caffeine use    Feels safe at home    Lives with spouse    Recreational activities: walking       Review of Systems   Respiratory: Negative  Cardiovascular: Negative  Gastrointestinal: Negative for constipation and diarrhea  Genitourinary: Negative for difficulty urinating, pelvic pain, vaginal bleeding, vaginal discharge, itching or odor  O:  /64 (BP Location: Right arm, Patient Position: Sitting, Cuff Size: Standard)   Ht 5' 6" (1 676 m)   Wt 127 kg (280 lb 12 8 oz)   LMP 02/27/2021   BMI 45 32 kg/m²   She appears well and is in no distress  Abdomen is soft and nontender  External genitals show a small erythematous, tender cyst at the posterior labia minora on the left  A/P: Vulvar cyst  Recommended epsom salt baths/warm moist compresses until resolved  Call with worsening or if not better in a week

## 2021-04-02 ENCOUNTER — TRANSCRIBE ORDERS (OUTPATIENT)
Dept: LAB | Facility: HOSPITAL | Age: 46
End: 2021-04-02

## 2021-04-12 DIAGNOSIS — E06.3 HASHIMOTO'S THYROIDITIS: ICD-10-CM

## 2021-04-12 RX ORDER — LEVOTHYROXINE SODIUM 0.1 MG/1
TABLET ORAL
Qty: 30 TABLET | Refills: 0 | OUTPATIENT
Start: 2021-04-12

## 2021-04-28 ENCOUNTER — OFFICE VISIT (OUTPATIENT)
Dept: INTERNAL MEDICINE CLINIC | Facility: CLINIC | Age: 46
End: 2021-04-28
Payer: COMMERCIAL

## 2021-04-28 ENCOUNTER — TELEPHONE (OUTPATIENT)
Dept: INTERNAL MEDICINE CLINIC | Facility: CLINIC | Age: 46
End: 2021-04-28

## 2021-04-28 VITALS
WEIGHT: 275 LBS | SYSTOLIC BLOOD PRESSURE: 122 MMHG | OXYGEN SATURATION: 98 % | HEIGHT: 66 IN | DIASTOLIC BLOOD PRESSURE: 62 MMHG | HEART RATE: 116 BPM | BODY MASS INDEX: 44.2 KG/M2

## 2021-04-28 DIAGNOSIS — E06.3 HASHIMOTO'S THYROIDITIS: Primary | ICD-10-CM

## 2021-04-28 DIAGNOSIS — J45.909 ASTHMA, UNSPECIFIED ASTHMA SEVERITY, UNSPECIFIED WHETHER COMPLICATED, UNSPECIFIED WHETHER PERSISTENT: ICD-10-CM

## 2021-04-28 DIAGNOSIS — D50.0 IRON DEFICIENCY ANEMIA DUE TO CHRONIC BLOOD LOSS: ICD-10-CM

## 2021-04-28 DIAGNOSIS — E78.1 HYPERTRIGLYCERIDEMIA: ICD-10-CM

## 2021-04-28 DIAGNOSIS — E11.9 TYPE 2 DIABETES MELLITUS WITHOUT COMPLICATION, WITHOUT LONG-TERM CURRENT USE OF INSULIN (HCC): ICD-10-CM

## 2021-04-28 PROCEDURE — 3008F BODY MASS INDEX DOCD: CPT | Performed by: NURSE PRACTITIONER

## 2021-04-28 PROCEDURE — 3725F SCREEN DEPRESSION PERFORMED: CPT | Performed by: NURSE PRACTITIONER

## 2021-04-28 PROCEDURE — 1036F TOBACCO NON-USER: CPT | Performed by: NURSE PRACTITIONER

## 2021-04-28 PROCEDURE — 99214 OFFICE O/P EST MOD 30 MIN: CPT | Performed by: NURSE PRACTITIONER

## 2021-04-28 RX ORDER — LEVOTHYROXINE SODIUM 0.1 MG/1
TABLET ORAL
Qty: 45 TABLET | Refills: 1 | Status: SHIPPED | OUTPATIENT
Start: 2021-04-28

## 2021-04-28 RX ORDER — LEVOTHYROXINE SODIUM 112 UG/1
112 TABLET ORAL
Qty: 90 TABLET | Refills: 3 | Status: SHIPPED | OUTPATIENT
Start: 2021-04-28 | End: 2021-04-28 | Stop reason: DRUGHIGH

## 2021-04-28 RX ORDER — LEVOTHYROXINE SODIUM 112 UG/1
TABLET ORAL
Qty: 45 TABLET | Refills: 1 | Status: SHIPPED | OUTPATIENT
Start: 2021-04-28

## 2021-04-28 NOTE — PROGRESS NOTES
Assessment/Plan:    1  Hypothyroidism- Not at goal Increase Levothyroxine to 100 mcg alternating with 112 mcg and recheck TSH in two and six months  2  Diabetes- A1c added to labs  Foot exam performed today  3  Hyperlipidemia- Due for lipid panel  4  Asthma- Stable, rarely needs rescue inhaler  Please have your labs completed soon and we will call you with results  Follow up in six months, labs prior  Diagnoses and all orders for this visit:    Hashimoto's thyroiditis  -     Discontinue: levothyroxine 112 mcg tablet; Take 1 tablet (112 mcg total) by mouth daily in the early morning  -     TSH, 3rd generation with Free T4 reflex; Future  -     TSH, 3rd generation with Free T4 reflex; Future  -     levothyroxine 100 mcg tablet; Take one tab po every other day (alt with 112 mcg)  -     levothyroxine 112 mcg tablet; Take one tab po every other day (alt with 110 mcg)    Asthma, unspecified asthma severity, unspecified whether complicated, unspecified whether persistent  -     CBC and differential; Future  -     Comprehensive metabolic panel; Future    Type 2 diabetes mellitus without complication, without long-term current use of insulin (HCC)  -     HEMOGLOBIN A1C W/ EAG ESTIMATION; Future    Hypertriglyceridemia  -     Lipid panel; Future    Iron deficiency anemia due to chronic blood loss        The patient was counseled regarding instructions for management, risk factor reductions, patient and family education,impressions, risks and benefits of treatment options, side effects of medications, importance of compliance with treatment  The treatment plan was reviewed with the patient/guardian and patient/guardian understands and agrees with the treatment plan          Current Outpatient Medications:     albuterol (2 5 mg/3 mL) 0 083 % nebulizer solution, Take 1 vial (2 5 mg total) by nebulization every 4 (four) hours as needed for wheezing or shortness of breath, Disp: 5 vial, Rfl: 1    albuterol (Ventolin HFA) 90 mcg/act inhaler, Inhale 2 puffs every 6 (six) hours as needed for wheezing or shortness of breath, Disp: 1 Inhaler, Rfl: 0    Cholecalciferol (VITAMIN D3) 1 25 MG (03988 UT) CAPS, TAKE ONE CAPSULE BY MOUTH WEEKLY, Disp: 12 capsule, Rfl: 3    ferrous sulfate 324 (65 Fe) mg, Take 1 twice a day with vitamin-C, Disp: 100 tablet, Rfl: 2    levothyroxine 100 mcg tablet, Take one tab po every other day (alt with 112 mcg), Disp: 45 tablet, Rfl: 1    levothyroxine 112 mcg tablet, Take one tab po every other day (alt with 110 mcg), Disp: 45 tablet, Rfl: 1    Subjective:      Patient ID: Thom Godfrey is a 40 y o  female  Here for follow up  Had recent bout of possible food poisoning- nausea, vomiting, diarrhea, has mainly resolved  Stools are soft, but not quite normal and has some mild LUQ tenderness  The following portions of the patient's history were reviewed and updated as appropriate:   She has a past medical history of Bell's palsy, COVID-19 (10/20/2020), Diabetes mellitus during pregnancy, Hashimoto's disease, and Migraine  ,  does not have any pertinent problems on file  ,   has a past surgical history that includes  section; Hysteroscopy; US guided thyroid biopsy (2019); and US guided thyroid biopsy (2019)  ,  family history includes Cervical cancer in her maternal aunt; Heart disease in her father; Hyperlipidemia in her father; Hypertension in her father; Migraines in her mother  ,   reports that she has never smoked  She has never used smokeless tobacco  She reports current alcohol use  She reports that she does not use drugs  ,  is allergic to aspirin and iodinated diagnostic agents       Review of Systems   Constitutional: Negative  Respiratory: Negative  Cardiovascular: Negative  Gastrointestinal: Positive for abdominal pain  Musculoskeletal: Negative  Psychiatric/Behavioral: Negative          Objective:  /62   Pulse (!) 116   Ht 5' 6" (1 676 m) Wt 125 kg (275 lb)   SpO2 98%   BMI 44 39 kg/m²     Lab Review  Appointment on 03/17/2021   Component Date Value    TSH 3RD GENERATON 03/17/2021 3 987*      Imaging: No results found  Physical Exam  Constitutional:       Appearance: She is well-developed  Cardiovascular:      Rate and Rhythm: Normal rate and regular rhythm  Heart sounds: Normal heart sounds  Pulmonary:      Effort: Pulmonary effort is normal       Breath sounds: Normal breath sounds  Abdominal:      General: Bowel sounds are normal       Palpations: Abdomen is soft  Tenderness: There is no abdominal tenderness  Musculoskeletal: Normal range of motion  Neurological:      Mental Status: She is alert and oriented to person, place, and time  Deep Tendon Reflexes: Reflexes are normal and symmetric  Psychiatric:         Behavior: Behavior normal          Thought Content:  Thought content normal          Judgment: Judgment normal

## 2021-04-28 NOTE — PATIENT INSTRUCTIONS
1  Hypothyroidism- Not at goal Increase Levothyroxine to 100 mcg alternating with 112 mcg and recheck TSH in two and six months  2  Diabetes- A1c added to labs  Foot exam performed today  3  Hyperlipidemia- Due for lipid panel  4  Asthma- Stable, rarely needs rescue inhaler  Please have your labs completed soon and we will call you with results  Follow up in six months, labs prior

## 2021-05-19 ENCOUNTER — HOSPITAL ENCOUNTER (OUTPATIENT)
Dept: MAMMOGRAPHY | Facility: CLINIC | Age: 46
Discharge: HOME | End: 2021-05-19
Payer: COMMERCIAL

## 2021-05-19 VITALS — BODY MASS INDEX: 44.2 KG/M2 | HEIGHT: 66 IN | WEIGHT: 275 LBS

## 2021-05-19 DIAGNOSIS — Z01.419 ENCOUNTER FOR GYNECOLOGICAL EXAMINATION WITH PAPANICOLAOU SMEAR OF CERVIX: ICD-10-CM

## 2021-05-19 DIAGNOSIS — Z12.39 SCREENING FOR MALIGNANT NEOPLASM OF BREAST: ICD-10-CM

## 2021-05-19 DIAGNOSIS — Z12.31 VISIT FOR SCREENING MAMMOGRAM: ICD-10-CM

## 2021-05-19 PROCEDURE — 77063 BREAST TOMOSYNTHESIS BI: CPT

## 2021-05-19 PROCEDURE — 77067 SCR MAMMO BI INCL CAD: CPT

## 2021-11-18 ENCOUNTER — APPOINTMENT (EMERGENCY)
Dept: CT IMAGING | Facility: HOSPITAL | Age: 46
End: 2021-11-18
Payer: COMMERCIAL

## 2021-11-18 ENCOUNTER — HOSPITAL ENCOUNTER (EMERGENCY)
Facility: HOSPITAL | Age: 46
Discharge: HOME | End: 2021-11-18
Attending: EMERGENCY MEDICINE
Payer: COMMERCIAL

## 2021-11-18 VITALS
OXYGEN SATURATION: 98 % | SYSTOLIC BLOOD PRESSURE: 139 MMHG | DIASTOLIC BLOOD PRESSURE: 70 MMHG | BODY MASS INDEX: 44.39 KG/M2 | HEIGHT: 66 IN | TEMPERATURE: 98.1 F | HEART RATE: 86 BPM | RESPIRATION RATE: 20 BRPM

## 2021-11-18 DIAGNOSIS — R10.32 LEFT LOWER QUADRANT PAIN: Primary | ICD-10-CM

## 2021-11-18 LAB
ALBUMIN SERPL BCP-MCNC: 3.1 G/DL (ref 3.5–5)
ALP SERPL-CCNC: 99 U/L (ref 46–116)
ALT SERPL W P-5'-P-CCNC: 22 U/L (ref 12–78)
ANION GAP SERPL CALCULATED.3IONS-SCNC: 5 MMOL/L (ref 4–13)
AST SERPL W P-5'-P-CCNC: 9 U/L (ref 5–45)
ATRIAL RATE: 119 BPM
BACTERIA UR QL AUTO: ABNORMAL /HPF
BASOPHILS # BLD AUTO: 0.02 THOUSANDS/ΜL (ref 0–0.1)
BASOPHILS NFR BLD AUTO: 0 % (ref 0–1)
BILIRUB SERPL-MCNC: 0.48 MG/DL (ref 0.2–1)
BILIRUB UR QL STRIP: NEGATIVE
BUN SERPL-MCNC: 14 MG/DL (ref 5–25)
CALCIUM ALBUM COR SERPL-MCNC: 9.1 MG/DL (ref 8.3–10.1)
CALCIUM SERPL-MCNC: 8.4 MG/DL (ref 8.3–10.1)
CHLORIDE SERPL-SCNC: 106 MMOL/L (ref 100–108)
CLARITY UR: CLEAR
CO2 SERPL-SCNC: 28 MMOL/L (ref 21–32)
COLOR UR: YELLOW
CREAT SERPL-MCNC: 0.85 MG/DL (ref 0.6–1.3)
EOSINOPHIL # BLD AUTO: 0.17 THOUSAND/ΜL (ref 0–0.61)
EOSINOPHIL NFR BLD AUTO: 3 % (ref 0–6)
ERYTHROCYTE [DISTWIDTH] IN BLOOD BY AUTOMATED COUNT: 13.6 % (ref 11.6–15.1)
EXT PREG TEST URINE: NEGATIVE
EXT. CONTROL ED NAV: NORMAL
GFR SERPL CREATININE-BSD FRML MDRD: 83 ML/MIN/1.73SQ M
GLUCOSE SERPL-MCNC: 206 MG/DL (ref 65–140)
GLUCOSE UR STRIP-MCNC: ABNORMAL MG/DL
HCT VFR BLD AUTO: 35.2 % (ref 34.8–46.1)
HGB BLD-MCNC: 10.8 G/DL (ref 11.5–15.4)
HGB UR QL STRIP.AUTO: NEGATIVE
IMM GRANULOCYTES # BLD AUTO: 0.03 THOUSAND/UL (ref 0–0.2)
IMM GRANULOCYTES NFR BLD AUTO: 1 % (ref 0–2)
KETONES UR STRIP-MCNC: NEGATIVE MG/DL
LEUKOCYTE ESTERASE UR QL STRIP: ABNORMAL
LIPASE SERPL-CCNC: 140 U/L (ref 73–393)
LYMPHOCYTES # BLD AUTO: 1.72 THOUSANDS/ΜL (ref 0.6–4.47)
LYMPHOCYTES NFR BLD AUTO: 27 % (ref 14–44)
MCH RBC QN AUTO: 26.9 PG (ref 26.8–34.3)
MCHC RBC AUTO-ENTMCNC: 30.7 G/DL (ref 31.4–37.4)
MCV RBC AUTO: 88 FL (ref 82–98)
MONOCYTES # BLD AUTO: 0.4 THOUSAND/ΜL (ref 0.17–1.22)
MONOCYTES NFR BLD AUTO: 6 % (ref 4–12)
NEUTROPHILS # BLD AUTO: 4.05 THOUSANDS/ΜL (ref 1.85–7.62)
NEUTS SEG NFR BLD AUTO: 63 % (ref 43–75)
NITRITE UR QL STRIP: NEGATIVE
NON-SQ EPI CELLS URNS QL MICRO: ABNORMAL /HPF
NRBC BLD AUTO-RTO: 0 /100 WBCS
P AXIS: 73 DEGREES
PH UR STRIP.AUTO: 6 [PH]
PLATELET # BLD AUTO: 244 THOUSANDS/UL (ref 149–390)
PMV BLD AUTO: 11.4 FL (ref 8.9–12.7)
POTASSIUM SERPL-SCNC: 3.8 MMOL/L (ref 3.5–5.3)
PR INTERVAL: 142 MS
PROT SERPL-MCNC: 6.9 G/DL (ref 6.4–8.2)
PROT UR STRIP-MCNC: NEGATIVE MG/DL
QRS AXIS: 29 DEGREES
QRSD INTERVAL: 78 MS
QT INTERVAL: 318 MS
QTC INTERVAL: 447 MS
RBC # BLD AUTO: 4.02 MILLION/UL (ref 3.81–5.12)
RBC #/AREA URNS AUTO: ABNORMAL /HPF
SODIUM SERPL-SCNC: 139 MMOL/L (ref 136–145)
SP GR UR STRIP.AUTO: 1.02 (ref 1–1.03)
T WAVE AXIS: 61 DEGREES
UROBILINOGEN UR QL STRIP.AUTO: 0.2 E.U./DL
VENTRICULAR RATE: 119 BPM
WBC # BLD AUTO: 6.39 THOUSAND/UL (ref 4.31–10.16)
WBC #/AREA URNS AUTO: ABNORMAL /HPF

## 2021-11-18 PROCEDURE — 96374 THER/PROPH/DIAG INJ IV PUSH: CPT

## 2021-11-18 PROCEDURE — 83690 ASSAY OF LIPASE: CPT | Performed by: PHYSICIAN ASSISTANT

## 2021-11-18 PROCEDURE — 96361 HYDRATE IV INFUSION ADD-ON: CPT

## 2021-11-18 PROCEDURE — 81001 URINALYSIS AUTO W/SCOPE: CPT | Performed by: PHYSICIAN ASSISTANT

## 2021-11-18 PROCEDURE — 93005 ELECTROCARDIOGRAM TRACING: CPT

## 2021-11-18 PROCEDURE — 81025 URINE PREGNANCY TEST: CPT | Performed by: PHYSICIAN ASSISTANT

## 2021-11-18 PROCEDURE — 93010 ELECTROCARDIOGRAM REPORT: CPT | Performed by: INTERNAL MEDICINE

## 2021-11-18 PROCEDURE — 80053 COMPREHEN METABOLIC PANEL: CPT | Performed by: PHYSICIAN ASSISTANT

## 2021-11-18 PROCEDURE — 36415 COLL VENOUS BLD VENIPUNCTURE: CPT | Performed by: PHYSICIAN ASSISTANT

## 2021-11-18 PROCEDURE — 85025 COMPLETE CBC W/AUTO DIFF WBC: CPT | Performed by: PHYSICIAN ASSISTANT

## 2021-11-18 PROCEDURE — 96375 TX/PRO/DX INJ NEW DRUG ADDON: CPT

## 2021-11-18 PROCEDURE — 74176 CT ABD & PELVIS W/O CONTRAST: CPT

## 2021-11-18 PROCEDURE — 99284 EMERGENCY DEPT VISIT MOD MDM: CPT | Performed by: PHYSICIAN ASSISTANT

## 2021-11-18 PROCEDURE — 96376 TX/PRO/DX INJ SAME DRUG ADON: CPT

## 2021-11-18 PROCEDURE — 99285 EMERGENCY DEPT VISIT HI MDM: CPT

## 2021-11-18 RX ORDER — HYDROMORPHONE HCL/PF 1 MG/ML
0.5 SYRINGE (ML) INJECTION ONCE
Status: COMPLETED | OUTPATIENT
Start: 2021-11-18 | End: 2021-11-18

## 2021-11-18 RX ORDER — ACETAMINOPHEN 325 MG/1
975 TABLET ORAL ONCE
Status: COMPLETED | OUTPATIENT
Start: 2021-11-18 | End: 2021-11-18

## 2021-11-18 RX ORDER — KETOROLAC TROMETHAMINE 30 MG/ML
15 INJECTION, SOLUTION INTRAMUSCULAR; INTRAVENOUS ONCE
Status: COMPLETED | OUTPATIENT
Start: 2021-11-18 | End: 2021-11-18

## 2021-11-18 RX ORDER — DICYCLOMINE HCL 20 MG
20 TABLET ORAL ONCE
Status: COMPLETED | OUTPATIENT
Start: 2021-11-18 | End: 2021-11-18

## 2021-11-18 RX ORDER — DICYCLOMINE HCL 20 MG
20 TABLET ORAL EVERY 6 HOURS PRN
Qty: 20 TABLET | Refills: 0 | Status: SHIPPED | OUTPATIENT
Start: 2021-11-18

## 2021-11-18 RX ADMIN — KETOROLAC TROMETHAMINE 15 MG: 30 INJECTION, SOLUTION INTRAMUSCULAR at 12:19

## 2021-11-18 RX ADMIN — SODIUM CHLORIDE 1000 ML: 0.9 INJECTION, SOLUTION INTRAVENOUS at 10:45

## 2021-11-18 RX ADMIN — ACETAMINOPHEN 975 MG: 325 TABLET, FILM COATED ORAL at 12:19

## 2021-11-18 RX ADMIN — HYDROMORPHONE HYDROCHLORIDE 0.5 MG: 1 INJECTION, SOLUTION INTRAMUSCULAR; INTRAVENOUS; SUBCUTANEOUS at 12:19

## 2021-11-18 RX ADMIN — KETOROLAC TROMETHAMINE 15 MG: 30 INJECTION, SOLUTION INTRAMUSCULAR at 10:50

## 2021-11-18 RX ADMIN — HYDROMORPHONE HYDROCHLORIDE 0.5 MG: 1 INJECTION, SOLUTION INTRAMUSCULAR; INTRAVENOUS; SUBCUTANEOUS at 11:45

## 2021-11-18 RX ADMIN — DICYCLOMINE HYDROCHLORIDE 20 MG: 20 TABLET ORAL at 12:19

## 2021-11-22 ENCOUNTER — TELEPHONE (OUTPATIENT)
Dept: INTERNAL MEDICINE CLINIC | Facility: CLINIC | Age: 46
End: 2021-11-22

## 2021-11-22 ENCOUNTER — OFFICE VISIT (OUTPATIENT)
Dept: INTERNAL MEDICINE CLINIC | Facility: CLINIC | Age: 46
End: 2021-11-22
Payer: COMMERCIAL

## 2021-11-22 ENCOUNTER — HOSPITAL ENCOUNTER (OUTPATIENT)
Dept: CT IMAGING | Facility: HOSPITAL | Age: 46
Discharge: HOME | End: 2021-11-22

## 2021-11-22 ENCOUNTER — APPOINTMENT (EMERGENCY)
Dept: CT IMAGING | Facility: HOSPITAL | Age: 46
End: 2021-11-22
Payer: COMMERCIAL

## 2021-11-22 ENCOUNTER — OFFICE VISIT (OUTPATIENT)
Dept: GASTROENTEROLOGY | Facility: CLINIC | Age: 46
End: 2021-11-22
Payer: COMMERCIAL

## 2021-11-22 ENCOUNTER — HOSPITAL ENCOUNTER (EMERGENCY)
Facility: HOSPITAL | Age: 46
Discharge: HOME | End: 2021-11-22
Attending: EMERGENCY MEDICINE | Admitting: EMERGENCY MEDICINE
Payer: COMMERCIAL

## 2021-11-22 VITALS
HEART RATE: 101 BPM | RESPIRATION RATE: 16 BRPM | BODY MASS INDEX: 44.36 KG/M2 | HEIGHT: 66 IN | DIASTOLIC BLOOD PRESSURE: 80 MMHG | TEMPERATURE: 98.6 F | OXYGEN SATURATION: 99 % | SYSTOLIC BLOOD PRESSURE: 128 MMHG | WEIGHT: 276 LBS

## 2021-11-22 VITALS
HEART RATE: 104 BPM | BODY MASS INDEX: 44.68 KG/M2 | WEIGHT: 278 LBS | DIASTOLIC BLOOD PRESSURE: 120 MMHG | HEIGHT: 66 IN | SYSTOLIC BLOOD PRESSURE: 162 MMHG | OXYGEN SATURATION: 97 %

## 2021-11-22 VITALS
OXYGEN SATURATION: 98 % | BODY MASS INDEX: 44.71 KG/M2 | DIASTOLIC BLOOD PRESSURE: 89 MMHG | TEMPERATURE: 98.1 F | HEART RATE: 83 BPM | SYSTOLIC BLOOD PRESSURE: 164 MMHG | WEIGHT: 277 LBS | RESPIRATION RATE: 16 BRPM

## 2021-11-22 DIAGNOSIS — R10.9 LEFT FLANK PAIN: Primary | ICD-10-CM

## 2021-11-22 DIAGNOSIS — M54.9 BACK PAIN, UNSPECIFIED BACK LOCATION, UNSPECIFIED BACK PAIN LATERALITY, UNSPECIFIED CHRONICITY: ICD-10-CM

## 2021-11-22 DIAGNOSIS — R10.32 LLQ ABDOMINAL PAIN: ICD-10-CM

## 2021-11-22 DIAGNOSIS — M54.9 BACK PAIN, UNSPECIFIED BACK LOCATION, UNSPECIFIED BACK PAIN LATERALITY, UNSPECIFIED CHRONICITY: Primary | ICD-10-CM

## 2021-11-22 DIAGNOSIS — R11.0 NAUSEA: ICD-10-CM

## 2021-11-22 LAB
ALBUMIN SERPL BCP-MCNC: 3.3 G/DL (ref 3.5–5)
ALP SERPL-CCNC: 97 U/L (ref 46–116)
ALT SERPL W P-5'-P-CCNC: 26 U/L (ref 12–78)
ANION GAP SERPL CALCULATED.3IONS-SCNC: 9 MMOL/L (ref 4–13)
AST SERPL W P-5'-P-CCNC: 12 U/L (ref 5–45)
BASOPHILS # BLD AUTO: 0.02 THOUSANDS/ΜL (ref 0–0.1)
BASOPHILS NFR BLD AUTO: 0 % (ref 0–1)
BILIRUB SERPL-MCNC: 0.88 MG/DL (ref 0.2–1)
BILIRUB UR QL STRIP: NEGATIVE
BUN SERPL-MCNC: 14 MG/DL (ref 5–25)
CALCIUM ALBUM COR SERPL-MCNC: 9.3 MG/DL (ref 8.3–10.1)
CALCIUM SERPL-MCNC: 8.7 MG/DL (ref 8.3–10.1)
CHLORIDE SERPL-SCNC: 106 MMOL/L (ref 100–108)
CLARITY UR: CLEAR
CO2 SERPL-SCNC: 22 MMOL/L (ref 21–32)
COLOR UR: YELLOW
CREAT SERPL-MCNC: 0.75 MG/DL (ref 0.6–1.3)
EOSINOPHIL # BLD AUTO: 0.24 THOUSAND/ΜL (ref 0–0.61)
EOSINOPHIL NFR BLD AUTO: 4 % (ref 0–6)
ERYTHROCYTE [DISTWIDTH] IN BLOOD BY AUTOMATED COUNT: 14 % (ref 11.6–15.1)
GFR SERPL CREATININE-BSD FRML MDRD: 97 ML/MIN/1.73SQ M
GLUCOSE SERPL-MCNC: 159 MG/DL (ref 65–140)
GLUCOSE UR STRIP-MCNC: NEGATIVE MG/DL
HCG SERPL QL: NEGATIVE
HCT VFR BLD AUTO: 36.8 % (ref 34.8–46.1)
HGB BLD-MCNC: 11.3 G/DL (ref 11.5–15.4)
HGB UR QL STRIP.AUTO: NEGATIVE
IMM GRANULOCYTES # BLD AUTO: 0.01 THOUSAND/UL (ref 0–0.2)
IMM GRANULOCYTES NFR BLD AUTO: 0 % (ref 0–2)
KETONES UR STRIP-MCNC: NEGATIVE MG/DL
LEUKOCYTE ESTERASE UR QL STRIP: NEGATIVE
LIPASE SERPL-CCNC: 99 U/L (ref 73–393)
LYMPHOCYTES # BLD AUTO: 1.71 THOUSANDS/ΜL (ref 0.6–4.47)
LYMPHOCYTES NFR BLD AUTO: 25 % (ref 14–44)
MCH RBC QN AUTO: 26.4 PG (ref 26.8–34.3)
MCHC RBC AUTO-ENTMCNC: 30.7 G/DL (ref 31.4–37.4)
MCV RBC AUTO: 86 FL (ref 82–98)
MONOCYTES # BLD AUTO: 0.44 THOUSAND/ΜL (ref 0.17–1.22)
MONOCYTES NFR BLD AUTO: 6 % (ref 4–12)
NEUTROPHILS # BLD AUTO: 4.41 THOUSANDS/ΜL (ref 1.85–7.62)
NEUTS SEG NFR BLD AUTO: 65 % (ref 43–75)
NITRITE UR QL STRIP: NEGATIVE
NRBC BLD AUTO-RTO: 0 /100 WBCS
PH UR STRIP.AUTO: 6 [PH]
PLATELET # BLD AUTO: 245 THOUSANDS/UL (ref 149–390)
PMV BLD AUTO: 11.9 FL (ref 8.9–12.7)
POTASSIUM SERPL-SCNC: 4.2 MMOL/L (ref 3.5–5.3)
PROT SERPL-MCNC: 7.3 G/DL (ref 6.4–8.2)
PROT UR STRIP-MCNC: NEGATIVE MG/DL
RBC # BLD AUTO: 4.28 MILLION/UL (ref 3.81–5.12)
SL AMB  POCT GLUCOSE, UA: NORMAL
SL AMB LEUKOCYTE ESTERASE,UA: NORMAL
SL AMB POCT BILIRUBIN,UA: NORMAL
SL AMB POCT BLOOD,UA: NORMAL
SL AMB POCT CLARITY,UA: NORMAL
SL AMB POCT COLOR,UA: NORMAL
SL AMB POCT KETONES,UA: NORMAL
SL AMB POCT NITRITE,UA: NORMAL
SL AMB POCT PH,UA: 6
SL AMB POCT SPECIFIC GRAVITY,UA: 1.03
SL AMB POCT URINE PROTEIN: NORMAL
SL AMB POCT UROBILINOGEN: 3.2
SODIUM SERPL-SCNC: 137 MMOL/L (ref 136–145)
SP GR UR STRIP.AUTO: 1.01 (ref 1–1.03)
UROBILINOGEN UR QL STRIP.AUTO: 0.2 E.U./DL
WBC # BLD AUTO: 6.83 THOUSAND/UL (ref 4.31–10.16)

## 2021-11-22 PROCEDURE — 96375 TX/PRO/DX INJ NEW DRUG ADDON: CPT

## 2021-11-22 PROCEDURE — 96365 THER/PROPH/DIAG IV INF INIT: CPT

## 2021-11-22 PROCEDURE — 99213 OFFICE O/P EST LOW 20 MIN: CPT | Performed by: NURSE PRACTITIONER

## 2021-11-22 PROCEDURE — 83690 ASSAY OF LIPASE: CPT | Performed by: EMERGENCY MEDICINE

## 2021-11-22 PROCEDURE — 74176 CT ABD & PELVIS W/O CONTRAST: CPT

## 2021-11-22 PROCEDURE — 81003 URINALYSIS AUTO W/O SCOPE: CPT | Performed by: EMERGENCY MEDICINE

## 2021-11-22 PROCEDURE — 80053 COMPREHEN METABOLIC PANEL: CPT | Performed by: EMERGENCY MEDICINE

## 2021-11-22 PROCEDURE — 96366 THER/PROPH/DIAG IV INF ADDON: CPT

## 2021-11-22 PROCEDURE — 99213 OFFICE O/P EST LOW 20 MIN: CPT | Performed by: PHYSICIAN ASSISTANT

## 2021-11-22 PROCEDURE — 99284 EMERGENCY DEPT VISIT MOD MDM: CPT

## 2021-11-22 PROCEDURE — 3061F NEG MICROALBUMINURIA REV: CPT | Performed by: NURSE PRACTITIONER

## 2021-11-22 PROCEDURE — 36415 COLL VENOUS BLD VENIPUNCTURE: CPT

## 2021-11-22 PROCEDURE — G1004 CDSM NDSC: HCPCS

## 2021-11-22 PROCEDURE — 84703 CHORIONIC GONADOTROPIN ASSAY: CPT | Performed by: EMERGENCY MEDICINE

## 2021-11-22 PROCEDURE — 81002 URINALYSIS NONAUTO W/O SCOPE: CPT | Performed by: NURSE PRACTITIONER

## 2021-11-22 PROCEDURE — 99285 EMERGENCY DEPT VISIT HI MDM: CPT | Performed by: EMERGENCY MEDICINE

## 2021-11-22 PROCEDURE — 85025 COMPLETE CBC W/AUTO DIFF WBC: CPT | Performed by: EMERGENCY MEDICINE

## 2021-11-22 RX ORDER — METHOCARBAMOL 500 MG/1
500 TABLET, FILM COATED ORAL 4 TIMES DAILY PRN
Qty: 28 TABLET | Refills: 0 | Status: SHIPPED | OUTPATIENT
Start: 2021-11-22 | End: 2021-11-29 | Stop reason: ALTCHOICE

## 2021-11-22 RX ORDER — METAXALONE 800 MG/1
800 TABLET ORAL 3 TIMES DAILY
Qty: 90 TABLET | Refills: 3 | Status: SHIPPED | OUTPATIENT
Start: 2021-11-22 | End: 2021-11-23

## 2021-11-22 RX ORDER — ONDANSETRON 4 MG/1
4 TABLET, ORALLY DISINTEGRATING ORAL EVERY 6 HOURS PRN
Qty: 20 TABLET | Refills: 0 | Status: SHIPPED | OUTPATIENT
Start: 2021-11-22

## 2021-11-22 RX ORDER — METHOCARBAMOL 500 MG/1
500 TABLET, FILM COATED ORAL ONCE
Status: COMPLETED | OUTPATIENT
Start: 2021-11-22 | End: 2021-11-22

## 2021-11-22 RX ORDER — ONDANSETRON 2 MG/ML
4 INJECTION INTRAMUSCULAR; INTRAVENOUS ONCE
Status: COMPLETED | OUTPATIENT
Start: 2021-11-22 | End: 2021-11-22

## 2021-11-22 RX ORDER — NAPROXEN 500 MG/1
500 TABLET ORAL 2 TIMES DAILY PRN
Qty: 60 TABLET | Refills: 0 | Status: SHIPPED | OUTPATIENT
Start: 2021-11-22

## 2021-11-22 RX ORDER — METHOCARBAMOL 500 MG/1
500 TABLET, FILM COATED ORAL 4 TIMES DAILY PRN
Qty: 28 TABLET | Refills: 0 | Status: SHIPPED | OUTPATIENT
Start: 2021-11-22 | End: 2021-11-22 | Stop reason: SDUPTHER

## 2021-11-22 RX ORDER — KETOROLAC TROMETHAMINE 30 MG/ML
15 INJECTION, SOLUTION INTRAMUSCULAR; INTRAVENOUS ONCE
Status: COMPLETED | OUTPATIENT
Start: 2021-11-22 | End: 2021-11-22

## 2021-11-22 RX ADMIN — METHOCARBAMOL 500 MG: 500 TABLET ORAL at 10:01

## 2021-11-22 RX ADMIN — KETOROLAC TROMETHAMINE 15 MG: 30 INJECTION, SOLUTION INTRAMUSCULAR at 10:45

## 2021-11-22 RX ADMIN — SODIUM CHLORIDE, SODIUM LACTATE, POTASSIUM CHLORIDE, AND CALCIUM CHLORIDE 1000 ML: .6; .31; .03; .02 INJECTION, SOLUTION INTRAVENOUS at 10:01

## 2021-11-22 RX ADMIN — MORPHINE SULFATE 2 MG: 2 INJECTION, SOLUTION INTRAMUSCULAR; INTRAVENOUS at 10:01

## 2021-11-22 RX ADMIN — ONDANSETRON 4 MG: 2 INJECTION INTRAMUSCULAR; INTRAVENOUS at 10:01

## 2021-11-23 ENCOUNTER — TELEPHONE (OUTPATIENT)
Dept: GASTROENTEROLOGY | Facility: CLINIC | Age: 46
End: 2021-11-23

## 2021-11-23 RX ORDER — METAXALONE 800 MG/1
TABLET ORAL
Qty: 90 TABLET | Refills: 3 | Status: SHIPPED | OUTPATIENT
Start: 2021-11-23 | End: 2021-11-29 | Stop reason: ALTCHOICE

## 2021-11-29 ENCOUNTER — OFFICE VISIT (OUTPATIENT)
Dept: INTERNAL MEDICINE CLINIC | Facility: CLINIC | Age: 46
End: 2021-11-29
Payer: COMMERCIAL

## 2021-11-29 VITALS
HEIGHT: 66 IN | BODY MASS INDEX: 45 KG/M2 | WEIGHT: 280 LBS | SYSTOLIC BLOOD PRESSURE: 142 MMHG | TEMPERATURE: 98.8 F | HEART RATE: 109 BPM | DIASTOLIC BLOOD PRESSURE: 80 MMHG | OXYGEN SATURATION: 99 %

## 2021-11-29 DIAGNOSIS — M54.16 LUMBAR RADICULOPATHY: Primary | ICD-10-CM

## 2021-11-29 PROCEDURE — 3725F SCREEN DEPRESSION PERFORMED: CPT | Performed by: NURSE PRACTITIONER

## 2021-11-29 PROCEDURE — 3008F BODY MASS INDEX DOCD: CPT | Performed by: NURSE PRACTITIONER

## 2021-11-29 PROCEDURE — 99213 OFFICE O/P EST LOW 20 MIN: CPT | Performed by: NURSE PRACTITIONER

## 2021-11-29 PROCEDURE — 1036F TOBACCO NON-USER: CPT | Performed by: NURSE PRACTITIONER

## 2021-11-29 RX ORDER — PREDNISONE 10 MG/1
TABLET ORAL
Qty: 30 TABLET | Refills: 0 | Status: SHIPPED | OUTPATIENT
Start: 2021-11-29

## 2021-12-28 ENCOUNTER — IMMUNIZATIONS (OUTPATIENT)
Dept: FAMILY MEDICINE CLINIC | Facility: HOSPITAL | Age: 46
End: 2021-12-28

## 2021-12-28 DIAGNOSIS — Z23 ENCOUNTER FOR IMMUNIZATION: Primary | ICD-10-CM

## 2021-12-28 PROCEDURE — 0001A COVID-19 PFIZER VACC 0.3 ML: CPT

## 2021-12-28 PROCEDURE — 91300 COVID-19 PFIZER VACC 0.3 ML: CPT

## 2022-01-27 ENCOUNTER — VBI (OUTPATIENT)
Dept: ADMINISTRATIVE | Facility: OTHER | Age: 47
End: 2022-01-27

## 2022-02-14 ENCOUNTER — RA CDI HCC (OUTPATIENT)
Dept: OTHER | Facility: HOSPITAL | Age: 47
End: 2022-02-14

## 2022-02-14 NOTE — PROGRESS NOTES
Based on clinical documentation indicated in your record, it appears that the patient may have the following conditions:    E66 01 Morbid obesity    If this is correct, please document and assess at your next visit Feb 23rd    [place] [address]  coding opportunities             Chart Reviewed * (Number of) Inbasket suggestions sent to Provider: 1                  Patients insurance company: [employer] ([employer] and [employer])             [place] [address]  coding opportunities             Chart Reviewed * (Number of) Inbasket suggestions sent to Provider: 1     Problem listed updated   Provider Accepted, (number of) suggestions accepted: 1               Patients insurance company: [employer] ([employer] and [employer])

## 2022-07-06 ENCOUNTER — TELEPHONE (OUTPATIENT)
Dept: INTERNAL MEDICINE CLINIC | Facility: CLINIC | Age: 47
End: 2022-07-06

## 2022-07-12 NOTE — TELEPHONE ENCOUNTER
3rd attempt ; left a detailed message asking the pt to call the office  I will send a letter out to her

## 2022-10-19 ENCOUNTER — OFFICE VISIT (OUTPATIENT)
Dept: INTERNAL MEDICINE CLINIC | Facility: CLINIC | Age: 47
End: 2022-10-19
Payer: COMMERCIAL

## 2022-10-19 VITALS
BODY MASS INDEX: 44.36 KG/M2 | TEMPERATURE: 98.4 F | OXYGEN SATURATION: 99 % | WEIGHT: 276 LBS | DIASTOLIC BLOOD PRESSURE: 88 MMHG | HEIGHT: 66 IN | RESPIRATION RATE: 14 BRPM | HEART RATE: 107 BPM | SYSTOLIC BLOOD PRESSURE: 120 MMHG

## 2022-10-19 DIAGNOSIS — J06.9 VIRAL UPPER RESPIRATORY TRACT INFECTION: ICD-10-CM

## 2022-10-19 DIAGNOSIS — E11.9 TYPE 2 DIABETES MELLITUS WITHOUT COMPLICATION, WITHOUT LONG-TERM CURRENT USE OF INSULIN (HCC): ICD-10-CM

## 2022-10-19 DIAGNOSIS — R89.9 ABNORMAL THYROID BIOPSY: ICD-10-CM

## 2022-10-19 DIAGNOSIS — Z12.31 ENCOUNTER FOR SCREENING MAMMOGRAM FOR MALIGNANT NEOPLASM OF BREAST: ICD-10-CM

## 2022-10-19 DIAGNOSIS — Z12.31 SCREENING MAMMOGRAM FOR BREAST CANCER: Primary | ICD-10-CM

## 2022-10-19 DIAGNOSIS — E55.9 VITAMIN D DEFICIENCY: ICD-10-CM

## 2022-10-19 DIAGNOSIS — E06.3 HASHIMOTO'S THYROIDITIS: ICD-10-CM

## 2022-10-19 DIAGNOSIS — E78.1 HYPERTRIGLYCERIDEMIA: ICD-10-CM

## 2022-10-19 DIAGNOSIS — D50.0 IRON DEFICIENCY ANEMIA DUE TO CHRONIC BLOOD LOSS: ICD-10-CM

## 2022-10-19 DIAGNOSIS — J45.909 ASTHMA, UNSPECIFIED ASTHMA SEVERITY, UNSPECIFIED WHETHER COMPLICATED, UNSPECIFIED WHETHER PERSISTENT: ICD-10-CM

## 2022-10-19 PROBLEM — R10.32 LEFT LOWER QUADRANT ABDOMINAL PAIN: Status: RESOLVED | Noted: 2021-11-22 | Resolved: 2022-10-19

## 2022-10-19 PROBLEM — M54.16 LUMBAR RADICULOPATHY: Status: RESOLVED | Noted: 2020-02-26 | Resolved: 2022-10-19

## 2022-10-19 PROBLEM — R53.83 OTHER FATIGUE: Status: RESOLVED | Noted: 2018-08-31 | Resolved: 2022-10-19

## 2022-10-19 PROBLEM — R10.9 LEFT FLANK PAIN: Status: RESOLVED | Noted: 2021-11-22 | Resolved: 2022-10-19

## 2022-10-19 PROCEDURE — 99214 OFFICE O/P EST MOD 30 MIN: CPT | Performed by: NURSE PRACTITIONER

## 2022-10-19 RX ORDER — LEVOTHYROXINE SODIUM 112 UG/1
TABLET ORAL
Qty: 30 TABLET | Refills: 0 | Status: SHIPPED | OUTPATIENT
Start: 2022-10-19

## 2022-10-19 RX ORDER — PREDNISONE 10 MG/1
10 TABLET ORAL DAILY
Qty: 5 TABLET | Refills: 0 | Status: SHIPPED | OUTPATIENT
Start: 2022-10-19

## 2022-10-19 RX ORDER — LEVOTHYROXINE SODIUM 0.1 MG/1
TABLET ORAL
Qty: 30 TABLET | Refills: 0 | Status: SHIPPED | OUTPATIENT
Start: 2022-10-19

## 2022-10-19 NOTE — PROGRESS NOTES
Patient's shoes and socks removed  Right Foot/Ankle   Right Foot Inspection  Skin Exam: skin normal and skin intact  No dry skin, no warmth, no callus, no erythema, no maceration, no abnormal color, no pre-ulcer, no ulcer and no callus  Toe Exam: ROM and strength within normal limits  Sensory   Monofilament testing: intact    Vascular  The right DP pulse is 2+  Right Toe  - Comprehensive Exam  Ecchymosis: none  Swelling: none         Left Foot/Ankle  Left Foot Inspection  Skin Exam: skin normal and skin intact  No dry skin, no warmth, no erythema, no maceration, normal color, no pre-ulcer, no ulcer and no callus  Toe Exam: ROM and strength within normal limits  Sensory   Monofilament testing: diminished    Vascular  The left DP pulse is 2+  Left Toe  - Comprehensive Exam  Ecchymosis: none  Swelling: none           Assign Risk Category  No deformity present  No loss of protective sensation  No weak pulses  Risk: 1  Assessment/Plan:    1  Viral upper respiratory tract infection- No fever today  Start low dose prednisone for symptom relief  Drinks lots of fluids and get sleep  Test yourself for Covid and obtain free Covid tests as discussed  2  Hashimoto's thyroiditis- TSH ordered  Continue present doses of Levothyroxine for now  We will let you know if you need dose adjustment  3  Type 2 diabetes- A1c added to labs  4  Hypertriglyceridemia- Due for lipid panel  5  Thyroid nodule- Had biopsy in the past,  nodule was benign  Please have your blood work completed prior to the next visit in two weeks  Mammogram also ordered  Diagnoses and all orders for this visit:    Screening mammogram for breast cancer  -     Mammo screening bilateral w 3d & cad; Future    Hashimoto's thyroiditis  -     levothyroxine 100 mcg tablet; Take one tab po every other day (alt with 112 mcg)  -     levothyroxine 112 mcg tablet;  Take one tab po every other day (alt with 110 mcg)  -     TSH, 3rd generation with Free T4 reflex; Future    Type 2 diabetes mellitus without complication, without long-term current use of insulin (HCC)  -     HEMOGLOBIN A1C W/ EAG ESTIMATION; Future    Asthma, unspecified asthma severity, unspecified whether complicated, unspecified whether persistent  -     CBC and differential; Future  -     Comprehensive metabolic panel; Future     abnormal thyroid biopsy Atypia of undetermined significance ([facility]     Iron deficiency anemia due to chronic blood loss    Vitamin D deficiency    Hypertriglyceridemia  -     Lipid panel; Future    Encounter for screening mammogram for malignant neoplasm of breast    Viral upper respiratory tract infection  -     predniSONE 10 mg tablet; Take 1 tablet (10 mg total) by mouth daily    The patient was counseled regarding instructions for management, risk factor reductions, patient and family education,impressions, risks and benefits of treatment options, side effects of medications, importance of compliance with treatment  The treatment plan was reviewed with the patient/guardian and patient/guardian understands and agrees with the treatment plan          Current Outpatient Medications:   •  albuterol (2 5 mg/3 mL) 0 083 % nebulizer solution, Take 1 vial (2 5 mg total) by nebulization every 4 (four) hours as needed for wheezing or shortness of breath, Disp: 5 vial, Rfl: 1  •  albuterol (Ventolin HFA) 90 mcg/act inhaler, Inhale 2 puffs every 6 (six) hours as needed for wheezing or shortness of breath, Disp: 1 Inhaler, Rfl: 0  •  Cholecalciferol (VITAMIN D3) 1 25 MG (08859 UT) CAPS, TAKE ONE CAPSULE BY MOUTH WEEKLY, Disp: 12 capsule, Rfl: 3  •  cyclobenzaprine (FLEXERIL) 10 mg tablet, Take 1 tablet (10 mg total) by mouth 3 (three) times a day as needed for muscle spasms, Disp: 90 tablet, Rfl: 3  •  dicyclomine (BENTYL) 20 mg tablet, Take 1 tablet (20 mg total) by mouth every 6 (six) hours as needed (abdominal cramping), Disp: 20 tablet, Rfl: 0  •  ferrous sulfate 324 (65 Fe) mg, Take 1 twice a day with vitamin-C, Disp: 100 tablet, Rfl: 2  •  levothyroxine 100 mcg tablet, Take one tab po every other day (alt with 112 mcg), Disp: 30 tablet, Rfl: 0  •  levothyroxine 112 mcg tablet, Take one tab po every other day (alt with 110 mcg), Disp: 30 tablet, Rfl: 0  •  naproxen (NAPROSYN) 500 mg tablet, Take 1 tablet (500 mg total) by mouth 2 (two) times a day as needed (pain), Disp: 60 tablet, Rfl: 0  •  ondansetron (ZOFRAN-ODT) 4 mg disintegrating tablet, Take 1 tablet (4 mg total) by mouth every 6 (six) hours as needed for nausea or vomiting, Disp: 20 tablet, Rfl: 0  •  predniSONE 10 mg tablet, Take 1 tablet (10 mg total) by mouth daily, Disp: 5 tablet, Rfl: 0    Subjective:      Patient ID: Slade Vee is a 56 y o  female  Patient has been feeling fullness in left side of neck, which started a few weeks ago  Three days ago, patient also noticed swelling of the left side of her neck  The left side of her neck is also tender and feels a lump under her left ear, along jaw  She has also been having low grade fevers and chills, fatigue, headache, sore throat, and pain/pressure in left ear  Patient is Covid vaccinated, has not tested for Covid  No recent sick contacts  The following portions of the patient's history were reviewed and updated as appropriate:   She has a past medical history of Bell's palsy, COVID-19 (10/20/2020), Diabetes mellitus during pregnancy, Hashimoto's disease, and Migraine  ,  does not have any pertinent problems on file  ,   has a past surgical history that includes  section; Hysteroscopy; US guided thyroid biopsy (2019); and US guided thyroid biopsy (2019)  ,  family history includes Cervical cancer in her maternal aunt; Endometrial cancer (age of onset: 21) in her maternal aunt; Endometrial cancer (age of onset: 27) in her paternal aunt;  Heart disease in her father; Hyperlipidemia in her father; Hypertension in her father; Migraines in her mother  ,   reports that she has never smoked  She has never used smokeless tobacco  She reports current alcohol use  She reports that she does not use drugs  ,  is allergic to iodinated diagnostic agents and aspirin       Review of Systems   Constitutional: Positive for chills and fever  HENT: Positive for ear pain, facial swelling and sore throat  Left ear pain, left neck swelling    Respiratory: Negative  Cardiovascular: Negative  Musculoskeletal: Negative  Psychiatric/Behavioral: Negative  Objective:  /88 (BP Location: Left arm, Patient Position: Sitting)   Pulse (!) 107   Temp 98 4 °F (36 9 °C) (Tympanic)   Resp 14   Ht 5' 6" (1 676 m)   Wt 125 kg (276 lb)   SpO2 99%   BMI 44 55 kg/m²     Lab Review  No visits with results within 2 Month(s) from this visit     Latest known visit with results is:   Admission on 11/22/2021, Discharged on 11/22/2021   Component Date Value   • WBC 11/22/2021 6 83    • RBC 11/22/2021 4 28    • Hemoglobin 11/22/2021 11 3 (A)   • Hematocrit 11/22/2021 36 8    • MCV 11/22/2021 86    • MCH 11/22/2021 26 4 (A)   • MCHC 11/22/2021 30 7 (A)   • RDW 11/22/2021 14 0    • MPV 11/22/2021 11 9    • Platelets 84/73/5916 245    • nRBC 11/22/2021 0    • Neutrophils Relative 11/22/2021 65    • Immat GRANS % 11/22/2021 0    • Lymphocytes Relative 11/22/2021 25    • Monocytes Relative 11/22/2021 6    • Eosinophils Relative 11/22/2021 4    • Basophils Relative 11/22/2021 0    • Neutrophils Absolute 11/22/2021 4 41    • Immature Grans Absolute 11/22/2021 0 01    • Lymphocytes Absolute 11/22/2021 1 71    • Monocytes Absolute 11/22/2021 0 44    • Eosinophils Absolute 11/22/2021 0 24    • Basophils Absolute 11/22/2021 0 02    • Sodium 11/22/2021 137    • Potassium 11/22/2021 4 2    • Chloride 11/22/2021 106    • CO2 11/22/2021 22    • ANION GAP 11/22/2021 9    • BUN 11/22/2021 14    • Creatinine 11/22/2021 0 75    • Glucose 11/22/2021 159 (A)   • Calcium 11/22/2021 8 7    • Corrected Calcium 11/22/2021 9 3    • AST 11/22/2021 12    • ALT 11/22/2021 26    • Alkaline Phosphatase 11/22/2021 97    • Total Protein 11/22/2021 7 3    • Albumin 11/22/2021 3 3 (A)   • Total Bilirubin 11/22/2021 0 88    • eGFR 11/22/2021 97    • Lipase 11/22/2021 99    • Color, UA 11/22/2021 Yellow    • Clarity, UA 11/22/2021 Clear    • Specific Gravity, UA 11/22/2021 1 010    • pH, UA 11/22/2021 6 0    • Leukocytes, UA 11/22/2021 Negative    • Nitrite, UA 11/22/2021 Negative    • Protein, UA 11/22/2021 Negative    • Glucose, UA 11/22/2021 Negative    • Ketones, UA 11/22/2021 Negative    • Urobilinogen, UA 11/22/2021 0 2    • Bilirubin, UA 11/22/2021 Negative    • Occult Blood, UA 11/22/2021 Negative    • Preg, Serum 11/22/2021 Negative         Imaging: No results found  Physical Exam  Constitutional:       Appearance: She is well-developed  HENT:      Head:      Comments: (+) Left side tonsil stone     Mouth/Throat:      Comments: Left tonsil stone   Neck:      Comments: Left neck swelling   Cardiovascular:      Rate and Rhythm: Normal rate and regular rhythm  Pulses: no weak pulses          Dorsalis pedis pulses are 2+ on the right side and 2+ on the left side  Heart sounds: Normal heart sounds  Pulmonary:      Effort: Pulmonary effort is normal       Breath sounds: Normal breath sounds  Musculoskeletal:         General: Normal range of motion  Feet:      Right foot:      Skin integrity: No ulcer, skin breakdown, erythema, warmth, callus or dry skin  Left foot:      Skin integrity: No ulcer, skin breakdown, erythema, warmth, callus or dry skin  Lymphadenopathy:      Cervical: Cervical adenopathy present  Neurological:      Mental Status: She is alert and oriented to person, place, and time  Deep Tendon Reflexes: Reflexes are normal and symmetric  Psychiatric:         Behavior: Behavior normal          Thought Content:  Thought content normal  Judgment: Judgment normal

## 2022-10-19 NOTE — PATIENT INSTRUCTIONS
Viral upper respiratory tract infection- No fever today  Start low dose prednisone for symptom relief  Drinks lots of fluids and get sleep  Test yourself for Covid and obtain free Covid tests as discussed  Hashimoto's thyroiditis- TSH ordered  Continue present doses of Levothyroxine for now  We will let you know if you need dose adjustment  Type 2 diabetes- A1c added to labs  Hypertriglyceridemia- Due for lipid panel  Thyroid nodule- Had biopsy in the past,  nodule was benign  Please have your blood work completed prior to the next visit in two weeks  Mammogram also ordered

## 2022-10-19 NOTE — LETTER
October 19, 2022     Patient: Milvia Vergara  YOB: 1975  Date of Visit: 10/19/2022      To Whom it May Concern:    Dashawn Mora is under my professional care  Dana Bruce was seen in my office on 10/19/2022  Brittjennifer Bruce may return to work on 10/24/2022  If you have any questions or concerns, please don't hesitate to call           Sincerely,          NAMITA Momin        CC: No Recipients

## 2022-10-21 ENCOUNTER — APPOINTMENT (OUTPATIENT)
Dept: LAB | Facility: HOSPITAL | Age: 47
End: 2022-10-21
Payer: COMMERCIAL

## 2022-10-21 DIAGNOSIS — J45.909 ASTHMA, UNSPECIFIED ASTHMA SEVERITY, UNSPECIFIED WHETHER COMPLICATED, UNSPECIFIED WHETHER PERSISTENT: ICD-10-CM

## 2022-10-21 DIAGNOSIS — E06.3 HASHIMOTO'S THYROIDITIS: ICD-10-CM

## 2022-10-21 DIAGNOSIS — E78.1 HYPERTRIGLYCERIDEMIA: ICD-10-CM

## 2022-10-21 DIAGNOSIS — E11.9 TYPE 2 DIABETES MELLITUS WITHOUT COMPLICATION, WITHOUT LONG-TERM CURRENT USE OF INSULIN (HCC): ICD-10-CM

## 2022-10-21 LAB
ALBUMIN SERPL BCP-MCNC: 3.4 G/DL (ref 3.5–5)
ALP SERPL-CCNC: 110 U/L (ref 46–116)
ALT SERPL W P-5'-P-CCNC: 30 U/L (ref 12–78)
ANION GAP SERPL CALCULATED.3IONS-SCNC: 9 MMOL/L (ref 4–13)
AST SERPL W P-5'-P-CCNC: 20 U/L (ref 5–45)
BASOPHILS # BLD AUTO: 0.02 THOUSANDS/ÂΜL (ref 0–0.1)
BASOPHILS NFR BLD AUTO: 0 % (ref 0–1)
BILIRUB SERPL-MCNC: 0.71 MG/DL (ref 0.2–1)
BUN SERPL-MCNC: 11 MG/DL (ref 5–25)
CALCIUM ALBUM COR SERPL-MCNC: 9.2 MG/DL (ref 8.3–10.1)
CALCIUM SERPL-MCNC: 8.7 MG/DL (ref 8.3–10.1)
CHLORIDE SERPL-SCNC: 105 MMOL/L (ref 96–108)
CHOLEST SERPL-MCNC: 206 MG/DL
CO2 SERPL-SCNC: 26 MMOL/L (ref 21–32)
CREAT SERPL-MCNC: 0.8 MG/DL (ref 0.6–1.3)
EOSINOPHIL # BLD AUTO: 0.31 THOUSAND/ÂΜL (ref 0–0.61)
EOSINOPHIL NFR BLD AUTO: 4 % (ref 0–6)
ERYTHROCYTE [DISTWIDTH] IN BLOOD BY AUTOMATED COUNT: 14.3 % (ref 11.6–15.1)
EST. AVERAGE GLUCOSE BLD GHB EST-MCNC: 192 MG/DL
GFR SERPL CREATININE-BSD FRML MDRD: 88 ML/MIN/1.73SQ M
GLUCOSE P FAST SERPL-MCNC: 204 MG/DL (ref 65–99)
HBA1C MFR BLD: 8.3 %
HCT VFR BLD AUTO: 36.2 % (ref 34.8–46.1)
HDLC SERPL-MCNC: 59 MG/DL
HGB BLD-MCNC: 11.1 G/DL (ref 11.5–15.4)
IMM GRANULOCYTES # BLD AUTO: 0.02 THOUSAND/UL (ref 0–0.2)
IMM GRANULOCYTES NFR BLD AUTO: 0 % (ref 0–2)
LDLC SERPL CALC-MCNC: 120 MG/DL (ref 0–100)
LYMPHOCYTES # BLD AUTO: 1.54 THOUSANDS/ÂΜL (ref 0.6–4.47)
LYMPHOCYTES NFR BLD AUTO: 20 % (ref 14–44)
MCH RBC QN AUTO: 26.2 PG (ref 26.8–34.3)
MCHC RBC AUTO-ENTMCNC: 30.7 G/DL (ref 31.4–37.4)
MCV RBC AUTO: 85 FL (ref 82–98)
MONOCYTES # BLD AUTO: 0.33 THOUSAND/ÂΜL (ref 0.17–1.22)
MONOCYTES NFR BLD AUTO: 4 % (ref 4–12)
NEUTROPHILS # BLD AUTO: 5.42 THOUSANDS/ÂΜL (ref 1.85–7.62)
NEUTS SEG NFR BLD AUTO: 72 % (ref 43–75)
NONHDLC SERPL-MCNC: 147 MG/DL
NRBC BLD AUTO-RTO: 0 /100 WBCS
PLATELET # BLD AUTO: 274 THOUSANDS/UL (ref 149–390)
PMV BLD AUTO: 11.1 FL (ref 8.9–12.7)
POTASSIUM SERPL-SCNC: 3.9 MMOL/L (ref 3.5–5.3)
PROT SERPL-MCNC: 7.5 G/DL (ref 6.4–8.4)
RBC # BLD AUTO: 4.24 MILLION/UL (ref 3.81–5.12)
SODIUM SERPL-SCNC: 140 MMOL/L (ref 135–147)
TRIGL SERPL-MCNC: 134 MG/DL
TSH SERPL DL<=0.05 MIU/L-ACNC: 3.69 UIU/ML (ref 0.45–4.5)
WBC # BLD AUTO: 7.64 THOUSAND/UL (ref 4.31–10.16)

## 2022-10-21 PROCEDURE — 84443 ASSAY THYROID STIM HORMONE: CPT

## 2022-10-21 PROCEDURE — 80053 COMPREHEN METABOLIC PANEL: CPT

## 2022-10-21 PROCEDURE — 85025 COMPLETE CBC W/AUTO DIFF WBC: CPT

## 2022-10-21 PROCEDURE — 36415 COLL VENOUS BLD VENIPUNCTURE: CPT

## 2022-10-21 PROCEDURE — 80061 LIPID PANEL: CPT

## 2022-10-21 PROCEDURE — 83036 HEMOGLOBIN GLYCOSYLATED A1C: CPT

## 2022-10-27 ENCOUNTER — RA CDI HCC (OUTPATIENT)
Dept: OTHER | Facility: HOSPITAL | Age: 47
End: 2022-10-27

## 2022-10-27 NOTE — PROGRESS NOTES
[facility]  coding opportunities          Chart Reviewed number of suggestions sent to Provider: 2   [id number] [phone]  [phone]    Patients Insurance        Commercial Insurance: [employer]

## 2022-11-02 ENCOUNTER — APPOINTMENT (OUTPATIENT)
Dept: LAB | Facility: HOSPITAL | Age: 47
End: 2022-11-02

## 2022-11-02 ENCOUNTER — OFFICE VISIT (OUTPATIENT)
Dept: INTERNAL MEDICINE CLINIC | Facility: CLINIC | Age: 47
End: 2022-11-02

## 2022-11-02 VITALS
RESPIRATION RATE: 16 BRPM | SYSTOLIC BLOOD PRESSURE: 114 MMHG | HEART RATE: 117 BPM | WEIGHT: 279.2 LBS | BODY MASS INDEX: 44.87 KG/M2 | DIASTOLIC BLOOD PRESSURE: 74 MMHG | HEIGHT: 66 IN | OXYGEN SATURATION: 97 %

## 2022-11-02 DIAGNOSIS — E06.3 HASHIMOTO'S THYROIDITIS: ICD-10-CM

## 2022-11-02 DIAGNOSIS — J45.909 ASTHMA, UNSPECIFIED ASTHMA SEVERITY, UNSPECIFIED WHETHER COMPLICATED, UNSPECIFIED WHETHER PERSISTENT: ICD-10-CM

## 2022-11-02 DIAGNOSIS — Z23 NEED FOR INFLUENZA VACCINATION: ICD-10-CM

## 2022-11-02 DIAGNOSIS — N92.0 MENORRHAGIA WITH REGULAR CYCLE: ICD-10-CM

## 2022-11-02 DIAGNOSIS — E11.9 TYPE 2 DIABETES MELLITUS WITHOUT COMPLICATION, WITHOUT LONG-TERM CURRENT USE OF INSULIN (HCC): Primary | ICD-10-CM

## 2022-11-02 NOTE — PROGRESS NOTES
Assessment/Plan:    1  Diabetes- A1c 8 3  Referred to diabetes educators  Start Metformin as discussed  Glucometer and supplies ordered  Check your blood sugar at least twice daily as we discussed and keep a blood sugar log to bring with you at your next appointment in three months  A1c can be done in office  2  Hypothyroidism- TSH at goal  Continue present doses of Levothyroxine  3  Menorrhagia with history of iron deficiency- TV US ordered  Iron panel ordered  Please have these tests done soon  Diagnoses and all orders for this visit:    Type 2 diabetes mellitus without complication, without long-term current use of insulin ([facility] [address] )  -     Ambulatory Referral to Diabetic Education; Future  -     metFORMIN (GLUCOPHAGE) 500 mg tablet; Take 1 tablet (500 mg total) by mouth 2 (two) times a day with meals  -     Glucometer  -     Glucometer test strips  -     Lancets    Hashimoto's thyroiditis    Asthma, unspecified asthma severity, unspecified whether complicated, unspecified whether persistent    Menorrhagia with regular cycle  -     US pelvis complete w transvaginal; Future  -     Iron Panel (Includes Ferritin, Iron Sat%, Iron, and TIBC); Future        The patient was counseled regarding instructions for management, risk factor reductions, patient and family education,impressions, risks and benefits of treatment options, side effects of medications, importance of compliance with treatment  The treatment plan was reviewed with the patient/guardian and patient/guardian understands and agrees with the treatment plan          Current Outpatient Medications:   •  albuterol (2 5 mg/3 mL) 0 083 % nebulizer solution, Take 1 vial (2 5 mg total) by nebulization every 4 (four) hours as needed for wheezing or shortness of breath, Disp: 5 vial, Rfl: 1  •  albuterol (Ventolin HFA) 90 mcg/act inhaler, Inhale 2 puffs every 6 (six) hours as needed for wheezing or shortness of breath, Disp: 1 Inhaler, Rfl: 0  •  Cholecalciferol (VITAMIN D3) 1 25 MG (06807 UT) CAPS, TAKE ONE CAPSULE BY MOUTH WEEKLY, Disp: 12 capsule, Rfl: 3  •  cyclobenzaprine (FLEXERIL) 10 mg tablet, Take 1 tablet (10 mg total) by mouth 3 (three) times a day as needed for muscle spasms, Disp: 90 tablet, Rfl: 3  •  ferrous sulfate 324 (65 Fe) mg, Take 1 twice a day with vitamin-C, Disp: 100 tablet, Rfl: 2  •  levothyroxine 100 mcg tablet, Take one tab po every other day (alt with 112 mcg), Disp: 30 tablet, Rfl: 0  •  levothyroxine 112 mcg tablet, Take one tab po every other day (alt with 110 mcg), Disp: 30 tablet, Rfl: 0  •  metFORMIN (GLUCOPHAGE) 500 mg tablet, Take 1 tablet (500 mg total) by mouth 2 (two) times a day with meals, Disp: 180 tablet, Rfl: 1  •  naproxen (NAPROSYN) 500 mg tablet, Take 1 tablet (500 mg total) by mouth 2 (two) times a day as needed (pain), Disp: 60 tablet, Rfl: 0  •  ondansetron (ZOFRAN-ODT) 4 mg disintegrating tablet, Take 1 tablet (4 mg total) by mouth every 6 (six) hours as needed for nausea or vomiting, Disp: 20 tablet, Rfl: 0  •  dicyclomine (BENTYL) 20 mg tablet, Take 1 tablet (20 mg total) by mouth every 6 (six) hours as needed (abdominal cramping) (Patient not taking: Reported on 2022), Disp: 20 tablet, Rfl: 0    Subjective:      Patient ID: Roland Kussmaul is a 56 y o  female  Here for follow up lab review  Feeling better  She notes heavy periods for the past few years  Periods last 7 days, four of which are heavy, changing super absorbent pad every 30 minutes to one hour  She has large blood clots  The following portions of the patient's history were reviewed and updated as appropriate:   She has a past medical history of Bell's palsy, COVID-19 (10/20/2020), Diabetes mellitus during pregnancy, Hashimoto's disease, and Migraine  ,  does not have any pertinent problems on file  ,   has a past surgical history that includes  section;  Hysteroscopy; US guided thyroid biopsy (2019); and US guided thyroid biopsy (9/19/2019)  ,  family history includes Cervical cancer in her maternal aunt; Endometrial cancer (age of onset: 21) in her maternal aunt; Endometrial cancer (age of onset: 27) in her paternal aunt; Heart disease in her father; Hyperlipidemia in her father; Hypertension in her father; Migraines in her mother  ,   reports that she has never smoked  She has never used smokeless tobacco  She reports current alcohol use  She reports that she does not use drugs  ,  is allergic to iodinated diagnostic agents and aspirin       Review of Systems   Constitutional: Negative  Respiratory: Negative  Cardiovascular: Negative  Musculoskeletal: Negative  Psychiatric/Behavioral: Negative            Objective:  /74 (BP Location: Left arm, Patient Position: Sitting, Cuff Size: Adult)   Pulse (!) 117   Resp 16   Ht 5' 6" (1 676 m)   Wt 127 kg (279 lb 3 2 oz)   SpO2 97%   BMI 45 06 kg/m²     Lab Review  Appointment on 10/21/2022   Component Date Value   • WBC 10/21/2022 7 64    • RBC 10/21/2022 4 24    • Hemoglobin 10/21/2022 11 1 (A)   • Hematocrit 10/21/2022 36 2    • MCV 10/21/2022 85    • MCH 10/21/2022 26 2 (A)   • MCHC 10/21/2022 30 7 (A)   • RDW 10/21/2022 14 3    • MPV 10/21/2022 11 1    • Platelets 21/55/2356 274    • nRBC 10/21/2022 0    • Neutrophils Relative 10/21/2022 72    • Immat GRANS % 10/21/2022 0    • Lymphocytes Relative 10/21/2022 20    • Monocytes Relative 10/21/2022 4    • Eosinophils Relative 10/21/2022 4    • Basophils Relative 10/21/2022 0    • Neutrophils Absolute 10/21/2022 5 42    • Immature Grans Absolute 10/21/2022 0 02    • Lymphocytes Absolute 10/21/2022 1 54    • Monocytes Absolute 10/21/2022 0 33    • Eosinophils Absolute 10/21/2022 0 31    • Basophils Absolute 10/21/2022 0 02    • Sodium 10/21/2022 140    • Potassium 10/21/2022 3 9    • Chloride 10/21/2022 105    • CO2 10/21/2022 26    • ANION GAP 10/21/2022 9    • BUN 10/21/2022 11    • Creatinine 10/21/2022 0 80    • Glucose, Fasting 10/21/2022 204 (A)   • Calcium 10/21/2022 8 7    • Corrected Calcium 10/21/2022 9 2    • AST 10/21/2022 20    • ALT 10/21/2022 30    • Alkaline Phosphatase 10/21/2022 110    • Total Protein 10/21/2022 7 5    • Albumin 10/21/2022 3 4 (A)   • Total Bilirubin 10/21/2022 0 71    • eGFR 10/21/2022 88    • Hemoglobin A1C 10/21/2022 8 3 (A)   • EAG 10/21/2022 192    • Cholesterol 10/21/2022 206 (A)   • Triglycerides 10/21/2022 134    • HDL, Direct 10/21/2022 59    • LDL Calculated 10/21/2022 120 (A)   • Non-HDL-Chol (CHOL-HDL) 10/21/2022 147    • TSH 3RD GENERATON 10/21/2022 3 688         Imaging: No results found  Physical Exam  Constitutional:       Appearance: She is well-developed  Cardiovascular:      Rate and Rhythm: Normal rate and regular rhythm  Heart sounds: Normal heart sounds  Pulmonary:      Effort: Pulmonary effort is normal       Breath sounds: Normal breath sounds  Musculoskeletal:         General: Normal range of motion  Neurological:      Mental Status: She is alert and oriented to person, place, and time  Deep Tendon Reflexes: Reflexes are normal and symmetric  Psychiatric:         Behavior: Behavior normal          Thought Content:  Thought content normal          Judgment: Judgment normal

## 2022-11-02 NOTE — PATIENT INSTRUCTIONS
Diabetes- A1c 8 3  Referred to diabetes educators  Start Metformin as discussed  Glucometer and supplies ordered  Check your blood sugar at least twice daily as we discussed and keep a blood sugar log to bring with you at your next appointment in three months  A1c can be done in office  Hypothyroidism- TSH at goal  Continue present doses of Levothyroxine  Menorrhagia with history of iron deficiency- TV US ordered  Iron panel ordered  Please have these tests done soon

## 2022-11-03 LAB
FERRITIN SERPL-MCNC: 5 NG/ML (ref 8–388)
IRON SATN MFR SERPL: 5 % (ref 15–50)
IRON SERPL-MCNC: 22 UG/DL (ref 50–170)
TIBC SERPL-MCNC: 475 UG/DL (ref 250–450)

## 2022-11-08 ENCOUNTER — HOSPITAL ENCOUNTER (OUTPATIENT)
Dept: ULTRASOUND IMAGING | Facility: HOSPITAL | Age: 47
Discharge: HOME | End: 2022-11-08

## 2022-11-08 DIAGNOSIS — E11.9 TYPE 2 DIABETES MELLITUS WITHOUT COMPLICATION, WITHOUT LONG-TERM CURRENT USE OF INSULIN (HCC): Primary | ICD-10-CM

## 2022-11-08 DIAGNOSIS — N92.0 MENORRHAGIA WITH REGULAR CYCLE: ICD-10-CM

## 2022-11-08 RX ORDER — DIABETIC SUPPLIES,MISCELL
KIT MISCELLANEOUS
Qty: 200 EACH | Refills: 5 | Status: SHIPPED | OUTPATIENT
Start: 2022-11-08

## 2022-11-08 RX ORDER — BLOOD-GLUCOSE METER
EACH MISCELLANEOUS
Qty: 1 KIT | Refills: 2 | Status: SHIPPED | OUTPATIENT
Start: 2022-11-08

## 2022-11-14 DIAGNOSIS — D21.9 FIBROID: Primary | ICD-10-CM

## 2022-11-15 ENCOUNTER — OFFICE VISIT (OUTPATIENT)
Dept: DIABETES SERVICES | Facility: CLINIC | Age: 47
End: 2022-11-15

## 2022-11-15 VITALS — WEIGHT: 277 LBS | BODY MASS INDEX: 44.71 KG/M2

## 2022-11-15 DIAGNOSIS — E11.9 TYPE 2 DIABETES MELLITUS WITHOUT COMPLICATION, WITHOUT LONG-TERM CURRENT USE OF INSULIN (HCC): Primary | ICD-10-CM

## 2022-11-15 NOTE — PROGRESS NOTES
Medical Nutrition Therapy        Assessment    Visit Type: Initial visit  Chief complaint/Medical Diagnosis/reason for visit E11 9 (ICD-10-CM) - Type 2 diabetes mellitus without complication, without long-term current use of insulin ([facility] 75 )    ROLY Lindsey Art was seen in person for the initial MNT appointment  Patient currently does not check BG and plans to start once she starts taking her Metformin  Lindsey Art plans to take her first dose this weekend and explained that she does not want to be at work if she has GI distress (specifically diarrhea)  She reports high levels of stress 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 [employer]  She is concerned that her health (specifically her blood sugars) are suffering and asked for advise for stress management  Explained the benefits of physical activity and exercise for BG control and also recommended using a phone application with guided meditation and breathing exercises  In an attempt to control BG while managing her busy lifestyle, Lindsey Art has replaced some meals with Boost Glucose Control  Explained that this allotment of carbohydrates is appropriate for a snack but not for her needs for an entire meal   Explained basic pathophysiology of diabetes and impact of diet on blood glucose levels and disease complications  Problems identified in food recall include meal skipping and inconsistent carbohydrate intake  Consumption of carbohydrates ranges from 15 to 100 grams per meal   Provided patient with a 1557 calorie meal plan to assist with consistency, balance and portion control  Encouraged the consumption of regular meals at regular times  Advised patient to keep carbohydrate intake to 45 grams per meal and 15 per snack to assist with glycemic control  Suggested keeping protein intake to 6 ounces a day and fat to 4-5 servings daily to assist with lipid management and calorie control   Portion booklet and food labels were used to teach basic carbohydrate counting  Patient agreed to keep daily food logs and return them in 2 months for assessment  RD will remain available for further dietary questions/concerns  Ht Readings from Last 1 Encounters:   11/02/22 5' 6" (1 676 m)     Wt Readings from Last 3 Encounters:   11/02/22 127 kg (279 lb 3 2 oz)   10/19/22 125 kg (276 lb)   11/29/21 127 kg (280 lb)     Weight Change: Yes 2 lbs intentional weight loss for a duration of about 2 weeks    Barriers to Learning: no barriers    Do you follow any special diet presently?: No  Who shops: patient and spouse  Who cooks: patient and spouse    Food Log: Completed via the method of food recall    Wakes up about 3 am    Limits dairy    Breakfast:8:30-9 am; 1 Boost Glucose Control supplement (16 g) with water OR 2 eggs with 2 toast (whole wheat or multigrain) with buttery spread ([place]) and meat (1 ham or 2 slices pork hanson) OR 1 c dry steel cut oats with water with a little more than 2 TBSP nuts and a little less than 2 TBSP cranberries with hot tea & Splenda (used to be sugar about 1 month ago)  Morning Snack:none  Lunch:1-1:30 pm Boost or skips;   Afternoon Snack: 3 pm; another Boost or 6 carrots or celery with 1/4 c hummus or cheese stick  Dinner:7-7:30 pm; 1 c pasta with chicken with light cream sauce with tomato with water  OR salad from chick filet (chicken, hard boiled egg, tomato, hanson bits, fried onion with avocado ranch dressing) with 1/2 c mac n cheese with water  Evening Snack: cannoli dip with 3 butter crisp cookie  Beverages: water, hot tea, diet green froilan tea  Eating out/Take out:3-4x/ week; chinese- chicken & cashew w/ pork fried rice w/egg roll or chicken & broccoli OR 2 slices veg or supreme pizza  Exercise none    Calorie needs 1557 kcals/day Carbs: 45 g/meal, 15 g/snack     Fat: 4-5 servings/day    Protein:6 ounces/day    Nutrition Diagnosis:  Food and nutrition related knowledge deficit  related to Lack of prior exposure to accurate nutrition related information as evidenced by Verbalizes inaccurate or incomplete information    Intervention: plate method, increased fiber intake, label reading, carbohydrate counting, meal timing, meal planning, exercise guidelines and food diary     Treatment Goals: Patient understands education and recommendations, Patient will monitor food intake daily with tracker, Patient will consume 3 meals a day, Patient will count carbohydrates and Patient will exercise    Monitoring and evaluation:    Term code indicator  FH 1 3 2 Food Intake Criteria: Eat 3 meals per day, 4-5 hours apart  No meal skipping  Term code indicator  FH 1 6 3 Carbohydrate Intake Criteria: Eat 30-45 grams of carbohydrate per meal, and no more than 15 grams per snack   Term code indicator  CH 2 2 Treatments/Therapy/Alternative Medicine Criteria: Initiate regular exercise to build to at least 30 minutes per day or 150 minutes of moderate exercise per week, pending physician approval      Materials Provided: Portion book, 3-day food log    Patient’s Response to Instruction:  Comprehensiongood  Motivationgood  Expected Compliancegood    Begin Time: 3:30 pm  End Time: 4:55 pm  Referring Provider: NAMITA Cohen    Thank you for coming to the [address] [place] [address] for education today  Please feel free to call with any questions or concerns      Floyd Morales RD  [address] [facility] [id number] [phone]

## 2022-11-15 NOTE — PATIENT INSTRUCTIONS
Eat 3 meals per day, 4-5 hours apart  No meal skipping       Eat 30-45 grams of carbohydrate per meal, and no more than 15 grams per snack     Initiate regular exercise to build to at least 30 minutes per day or 150 minutes of moderate exercise per week, pending physician approval      Complete 3-day food log and return completed log at the follow up appointment

## 2022-12-08 ENCOUNTER — OFFICE VISIT (OUTPATIENT)
Dept: OBGYN CLINIC | Facility: MEDICAL CENTER | Age: 47
End: 2022-12-08

## 2022-12-08 VITALS — SYSTOLIC BLOOD PRESSURE: 142 MMHG | BODY MASS INDEX: 44.48 KG/M2 | WEIGHT: 275.6 LBS | DIASTOLIC BLOOD PRESSURE: 92 MMHG

## 2022-12-08 DIAGNOSIS — N92.0 MENORRHAGIA WITH REGULAR CYCLE: Primary | ICD-10-CM

## 2022-12-08 DIAGNOSIS — D21.9 FIBROID: ICD-10-CM

## 2022-12-09 NOTE — PROGRESS NOTES
Assessment/Plan:    1  Menorrhagia with regular cycle  -See counseling below regarding management options   -Patient would like time to consider all options but will likely move forward with Mirena IUD at the time of EMB  -We will have patient return to office for EMB and Mirena IUD placement  Advised abstinence 2 weeks prior to insertion  Subjective:      Patient ID: Cait Saini is a 56 y o  female  Patient presents today for a problem visit  She presents today to discuss menorrhagia with a regular cycle  A transvaginal ultrasound was ordered by her PCP to address heavy menses  Impression can be seen below  The ultrasound showed a small fibroid as well as a heterogeneous myometrium  She has a past medical history significant for type 2 diabetes, BMI of 44, Hashimoto's  She states over the past year her menses have become increasingly heavier  Menses are regular and come every 28 to 30 days  Her menses last anywhere from 7 to 10 days  Most of the days are heavier in flow with more frequent clotting and cramping  This is new for the patient over the past year  She is not interested in any oral medications to treat her menses at this time  She would like to go over management options today  We reviewed management options for menorrhagia in detail including management with POP, Depo, Nexplanon, Mirena IUD  We also reviewed possible surgical management options such as ablation and hysterectomy  Reviewed the mechanism of action/process of all options  Reviewed given patient's elevated BMI, age and type 2 diabetes would recommend an endometrial biopsy before pursuing any management options  Patient is agreeable to this  She expresses interest in a Mirena IUD versus surgical management with a possible ablation  We discussed the benefits of the Mirena IUD together including endometrial protection  Reviewed with this option she would likely have little to no menses    Reviewed option of having Mirena placed at the same time and EMB  She believes she would like to move forward with this option  We reviewed guidelines of no intercourse 2 weeks prior to IUD placement/EMB  Current method of birth control is partner's vasectomy      IMPRESSION:     Hypoechoic lesion in the region of the  scar which may represent a fibroid      Heterogeneous echotexture of the myometrium for which nonemergent follow-up with MRI can be obtained      The following portions of the patient's history were reviewed and updated as appropriate: allergies, current medications, past family history, past medical history, past social history, past surgical history and problem list     Review of Systems      Objective:      /92 (BP Location: Left arm, Patient Position: Sitting, Cuff Size: Large)   Wt 125 kg (275 lb 9 6 oz)   BMI 44 48 kg/m²

## 2022-12-18 PROBLEM — J06.9 VIRAL UPPER RESPIRATORY TRACT INFECTION: Status: RESOLVED | Noted: 2022-10-19 | Resolved: 2022-12-18

## 2022-12-27 ENCOUNTER — TELEPHONE (OUTPATIENT)
Dept: INTERNAL MEDICINE CLINIC | Facility: CLINIC | Age: 47
End: 2022-12-27

## 2022-12-27 DIAGNOSIS — N63.0 LUMP IN FEMALE BREAST: Primary | ICD-10-CM

## 2022-12-27 NOTE — TELEPHONE ENCOUNTER
Patient found a lump on left breast  She did go for mammogram but they suggest she get order for a bilateral diagnostic mammogram and suggested also having ultrasound  They did not do the one today  Please call patient back so she can get this scheduled at #[phone]

## 2023-01-03 ENCOUNTER — HOSPITAL ENCOUNTER (OUTPATIENT)
Dept: MAMMOGRAPHY | Facility: CLINIC | Age: 48
Discharge: HOME | End: 2023-01-03

## 2023-01-03 ENCOUNTER — HOSPITAL ENCOUNTER (OUTPATIENT)
Dept: ULTRASOUND IMAGING | Facility: CLINIC | Age: 48
Discharge: HOME | End: 2023-01-03

## 2023-01-03 VITALS — BODY MASS INDEX: 44.2 KG/M2 | WEIGHT: 275 LBS | HEIGHT: 66 IN

## 2023-01-03 DIAGNOSIS — N63.0 LUMP IN FEMALE BREAST: ICD-10-CM

## 2023-01-03 DIAGNOSIS — N63.20 MASS OF LEFT BREAST, UNSPECIFIED QUADRANT: ICD-10-CM

## 2023-01-12 ENCOUNTER — PROCEDURE VISIT (OUTPATIENT)
Dept: OBGYN CLINIC | Facility: MEDICAL CENTER | Age: 48
End: 2023-01-12

## 2023-01-12 VITALS
DIASTOLIC BLOOD PRESSURE: 86 MMHG | HEIGHT: 65 IN | SYSTOLIC BLOOD PRESSURE: 140 MMHG | WEIGHT: 273 LBS | BODY MASS INDEX: 45.48 KG/M2

## 2023-01-12 DIAGNOSIS — Z32.02 NEGATIVE PREGNANCY TEST: ICD-10-CM

## 2023-01-12 DIAGNOSIS — N92.0 MENORRHAGIA WITH REGULAR CYCLE: Primary | ICD-10-CM

## 2023-01-12 NOTE — PROGRESS NOTES
Here today for EMB-has not been sexually active in at least 1 month and  had a vasectomy years ago  Has decided to to proceed with Mirena-would to discuss surgical management, understands will need MD  Pre-op consult

## 2023-01-12 NOTE — PROGRESS NOTES
Pt presents today for and EMB and Mirena IUD placement for menorrhagia  She has taken time to consider the Mirena IUD after counseling last visit  She has opted not to move forward with the Mirena IUD and would rather return to the office to discuss surgical interventions with a doctor  Will still plan to move forward with EMB today  See note below  EMB was not successful today due to cervical stenosis  Attempted to dilate with os finder and metal dilators without success  Attempted by myself and Rhiannon Siegel  Will have patient return to office to attempt EMB with a physician or discuss D&C hysterscopy    Endometrial biopsy    Date/Time: 1/12/2023 2:40 PM  Performed by: Elaina Devine PA-C  Authorized by: Elaina Devine PA-C   Universal Protocol:  Procedure performed by: (Rhiannon Siegel present for assistance/supervision)  Consent: Verbal consent obtained  Risks and benefits: risks, benefits and alternatives were discussed  Consent given by: patient  Patient understanding: patient states understanding of the procedure being performed  Patient identity confirmed: verbally with patient      Indication:     Indications: Other disorder of menstruation and other abnormal bleeding from female genital tract      Indications comment:  Menorrhagia  Pre-procedure:     Premeds:  Ibuprofen  Procedure:     Procedure: endometrial biopsy with Pipelle      A bivalve speculum was placed in the vagina: yes      Cervix cleaned and prepped: yes      Cervix dilated: attempted to dilate cervix with os finder and metal dilator without success  Uterus sounded: no      Unable to perform due to: cervical stenosis    Comments:     Procedure comments:  Attempted EMB today  Cervical stenosis noted on exam  Attempted to dilate with os finder and metal dilators without success  Attempted by myself and Rhiannon Siegel   Will have patient return to office to attempt EMB with a physician or discuss D&C hysterscopy

## 2023-01-13 LAB — SL AMB POCT URINE HCG: NEGATIVE

## 2023-01-16 ENCOUNTER — VBI (OUTPATIENT)
Dept: ADMINISTRATIVE | Facility: OTHER | Age: 48
End: 2023-01-16

## 2023-01-17 ENCOUNTER — PROCEDURE VISIT (OUTPATIENT)
Dept: OBGYN CLINIC | Facility: MEDICAL CENTER | Age: 48
End: 2023-01-17

## 2023-01-17 VITALS
DIASTOLIC BLOOD PRESSURE: 92 MMHG | HEIGHT: 65 IN | BODY MASS INDEX: 46.28 KG/M2 | WEIGHT: 277.8 LBS | SYSTOLIC BLOOD PRESSURE: 160 MMHG

## 2023-01-17 DIAGNOSIS — N92.0 MENORRHAGIA WITH REGULAR CYCLE: Primary | ICD-10-CM

## 2023-01-17 LAB — SL AMB POCT URINE HCG: NORMAL

## 2023-01-17 NOTE — PROGRESS NOTES
Assessment/Plan:      Diagnoses and all orders for this visit:    Menorrhagia with regular cycle  Biopsy attempted but unable to complete  Proceed with D&C  The patient was counseled on risks of hysteroscopy and dilation and curettage  She was counseled on the risk of bleeding, infection, and injury to surrounding structures  She was counseled in detail about the risk of uterine perforation and the possible need for operative laparoscopy if this would occur  She was counseled on the risks with blood transfusions should she need one  She expressed understanding and is in agreement with the plan  She signed consent  Considering ablation vs  Hysterectomy afterwards  Subjective:     Patient ID: Tuan Weaver is a 56 y o  female  55 yo F presents to f/u heavy menses  She has regular cycles but often is bleeding through maxi pads in 30-60 minutes and has bled through her clothes on multiple occasions  EMB was attempted at last visit but was not successful  She presents to discuss options for sampling as well as long term options  She previously considered the mirena IUD but is no longer interested in this  Review of Systems   All other systems reviewed and are negative  Objective:     Physical Exam  Vitals reviewed  Pulmonary:      Effort: Pulmonary effort is normal    Genitourinary:     Labia:         Right: No rash  Left: No rash  Neurological:      Mental Status: She is alert and oriented to person, place, and time     Psychiatric:         Behavior: Behavior normal        Attempted to complete

## 2023-02-04 ENCOUNTER — OFFICE VISIT (OUTPATIENT)
Dept: LAB | Facility: HOSPITAL | Age: 48
End: 2023-02-04

## 2023-02-04 ENCOUNTER — APPOINTMENT (OUTPATIENT)
Dept: LAB | Facility: HOSPITAL | Age: 48
End: 2023-02-04

## 2023-02-04 DIAGNOSIS — Z01.818 PRE-OP TESTING: ICD-10-CM

## 2023-02-04 LAB
ANION GAP SERPL CALCULATED.3IONS-SCNC: 6 MMOL/L (ref 4–13)
ATRIAL RATE: 91 BPM
BUN SERPL-MCNC: 12 MG/DL (ref 5–25)
CALCIUM SERPL-MCNC: 8.5 MG/DL (ref 8.3–10.1)
CHLORIDE SERPL-SCNC: 105 MMOL/L (ref 96–108)
CO2 SERPL-SCNC: 28 MMOL/L (ref 21–32)
CREAT SERPL-MCNC: 0.71 MG/DL (ref 0.6–1.3)
ERYTHROCYTE [DISTWIDTH] IN BLOOD BY AUTOMATED COUNT: 14.8 % (ref 11.6–15.1)
GFR SERPL CREATININE-BSD FRML MDRD: 102 ML/MIN/1.73SQ M
GLUCOSE P FAST SERPL-MCNC: 226 MG/DL (ref 65–99)
HCT VFR BLD AUTO: 34.4 % (ref 34.8–46.1)
HGB BLD-MCNC: 10.6 G/DL (ref 11.5–15.4)
MCH RBC QN AUTO: 25.9 PG (ref 26.8–34.3)
MCHC RBC AUTO-ENTMCNC: 30.8 G/DL (ref 31.4–37.4)
MCV RBC AUTO: 84 FL (ref 82–98)
P AXIS: 42 DEGREES
PLATELET # BLD AUTO: 231 THOUSANDS/UL (ref 149–390)
PMV BLD AUTO: 11.7 FL (ref 8.9–12.7)
POTASSIUM SERPL-SCNC: 3.5 MMOL/L (ref 3.5–5.3)
PR INTERVAL: 154 MS
QRS AXIS: -3 DEGREES
QRSD INTERVAL: 84 MS
QT INTERVAL: 356 MS
QTC INTERVAL: 437 MS
RBC # BLD AUTO: 4.09 MILLION/UL (ref 3.81–5.12)
SODIUM SERPL-SCNC: 139 MMOL/L (ref 135–147)
T WAVE AXIS: 4 DEGREES
VENTRICULAR RATE: 91 BPM
WBC # BLD AUTO: 6.17 THOUSAND/UL (ref 4.31–10.16)

## 2023-02-06 ENCOUNTER — OFFICE VISIT (OUTPATIENT)
Dept: DIABETES SERVICES | Facility: CLINIC | Age: 48
End: 2023-02-06

## 2023-02-06 VITALS — WEIGHT: 275.2 LBS | BODY MASS INDEX: 46.51 KG/M2

## 2023-02-06 DIAGNOSIS — E11.9 TYPE 2 DIABETES MELLITUS WITHOUT COMPLICATION, WITHOUT LONG-TERM CURRENT USE OF INSULIN (HCC): Primary | ICD-10-CM

## 2023-02-06 NOTE — PROGRESS NOTES
Medical Nutrition Therapy        Assessment    Visit Type: Follow-up visit  Chief complaint/Medical Diagnosis/reason for visit E11 9 (ICD-10-CM) - Type 2 diabetes mellitus without complication, without long-term current use of insulin ([facility] )    ROLY Broderick was seen in person for the follow-up MNT appointment  Patient was pleasant and returned completed food log  BG levels are checked once daily  Patient does not recall level  Patient's A1c has improved since October  Currently the A1c is 8 0, whereas it was 8 3%  Patient is walking for exercise  Problems identified in food recall include inconsistent carbohydrate intake, high fat food choices, limited non-starchy vegetables  Consumption of carbohydrates ranges from 16 to 98 grams per meal  Problem-solved ways to increase carbs to appropriate amounts at breakfast  Discussed intake of fat from keto products due to potential of fat to impact blood sugars and effect heart health  Portion booklet and food labels were used to review basic carbohydrate counting  Patient agreed to keep daily food logs and return them in 4 months for assessment  RD will remain available for further dietary questions/concerns       Ht Readings from Last 1 Encounters:   01/17/23 5' 4 5" (1 638 m)     Wt Readings from Last 3 Encounters:   01/17/23 126 kg (277 lb 12 8 oz)   01/12/23 124 kg (273 lb)   01/03/23 125 kg (275 lb)     Weight Change: No    Barriers to Learning: no barriers    Do you follow any special diet presently?: No  Who shops: patient and spouse  Who cooks: patient and spouse    Food Log: Please see scanned log    Calorie needs 1557 kcals/day Carbs: 30-45 g/meal, 15 g/snack     Fat: 4-5 servings/day    Protein:6 ounces/day    Nutrition Diagnosis:  Inconsistent carbohydrate intake  intake related to Physiological causes requiring careful timing and consistency in the amount of carbohydrate (i e  diabetes mellitus, hypoglycemia) as evidenced by  Estimated carbohydrate intake that is different from recommended types or ingested on an irregular basis    Intervention: reduced fat intake, label reading, carbohydrate counting, meal timing, exercise guidelines and food diary     Treatment Goals: Patient understands education and recommendations, Patient will monitor food intake daily with tracker, Patient will monitor fat intake, Patient will consume 3 meals a day, Patient will count carbohydrates, Patient will exercise and Patient will monitor blood glucose    Monitoring and evaluation:    Term code indicator  FH 1 3 2 Food Intake Criteria: Eat 3 meals per day, 4-5 hours apart  No meal skipping  Eat your snack 2-3 hours away from your meals  Term code indicator  FH 1 6 3 Carbohydrate Intake Criteria: Eat 30-45 grams of carbohydrate per meal, and no more than 15 grams per snack   Term code indicator  FH 1 6 1 Fat and Cholesterol Intake Criteria: Read food labels to limit fat to 4-5 servings per day    Materials Provided: 3-day food log    Patient’s Response to Instruction:  Comprehensiongood  Motivationgood  Expected Compliancegood    Begin Time: 4:00 pm  End Time: 4:51 pm  Referring Provider: Asha Calvillo    Thank you for coming to the [address] [place] [address] for education today  Please feel free to call with any questions or concerns      Ann Solomon, DMITRI  [address] [place] [id number] [phone]

## 2023-02-06 NOTE — PATIENT INSTRUCTIONS
Eat 3 meals per day, 4-5 hours apart  No meal skipping  Eat your snack 2-3 hours away from your meals       Eat 30-45 grams of carbohydrate per meal, and no more than 15 grams per snack     Read food labels to limit fat to 4-5 servings per day    Complete 3-day food log and return completed log at the follow up appointment

## 2023-02-09 ENCOUNTER — OFFICE VISIT (OUTPATIENT)
Dept: INTERNAL MEDICINE CLINIC | Facility: CLINIC | Age: 48
End: 2023-02-09

## 2023-02-09 VITALS
OXYGEN SATURATION: 99 % | HEIGHT: 65 IN | BODY MASS INDEX: 45.29 KG/M2 | HEART RATE: 109 BPM | WEIGHT: 271.8 LBS | DIASTOLIC BLOOD PRESSURE: 92 MMHG | RESPIRATION RATE: 16 BRPM | SYSTOLIC BLOOD PRESSURE: 138 MMHG

## 2023-02-09 DIAGNOSIS — E06.3 HASHIMOTO'S THYROIDITIS: ICD-10-CM

## 2023-02-09 DIAGNOSIS — E78.1 HYPERTRIGLYCERIDEMIA: ICD-10-CM

## 2023-02-09 DIAGNOSIS — E11.65 TYPE 2 DIABETES MELLITUS WITH HYPERGLYCEMIA, UNSPECIFIED WHETHER LONG TERM INSULIN USE (HCC): Primary | ICD-10-CM

## 2023-02-09 DIAGNOSIS — D50.0 IRON DEFICIENCY ANEMIA DUE TO CHRONIC BLOOD LOSS: ICD-10-CM

## 2023-02-09 DIAGNOSIS — Z12.11 COLON CANCER SCREENING: ICD-10-CM

## 2023-02-09 PROBLEM — R19.09 RIGHT GROIN MASS: Status: RESOLVED | Noted: 2018-01-11 | Resolved: 2023-02-09

## 2023-02-09 PROBLEM — E04.1 THYROID NODULE: Status: RESOLVED | Noted: 2019-06-07 | Resolved: 2023-02-09

## 2023-02-09 PROBLEM — R70.0 ELEVATED SED RATE: Status: RESOLVED | Noted: 2019-06-07 | Resolved: 2023-02-09

## 2023-02-09 PROBLEM — R79.82 ELEVATED C-REACTIVE PROTEIN (CRP): Status: RESOLVED | Noted: 2019-06-07 | Resolved: 2023-02-09

## 2023-02-09 LAB — SL AMB POCT UR MICROALBUMIN: NORMAL

## 2023-02-09 RX ORDER — LEVOTHYROXINE SODIUM 0.1 MG/1
TABLET ORAL
Qty: 90 TABLET | Refills: 0 | Status: SHIPPED | OUTPATIENT
Start: 2023-02-09

## 2023-02-09 RX ORDER — LEVOTHYROXINE SODIUM 112 UG/1
TABLET ORAL
Qty: 90 TABLET | Refills: 0 | Status: SHIPPED | OUTPATIENT
Start: 2023-02-09

## 2023-02-09 NOTE — PROGRESS NOTES
Assessment/Plan:     Here to establish care; PMH for hypothyroidism and DM; following with a dietician and has made diet changes and has lost weight; was given metformin but she did not take it- a1c 8; goal is 7; will plan to obtain new a1c in 3 months and make changes as needed  She has a procedure coming up for possible ablation and may need a hysterectomy  Quality Measures:     BMI Counseling: Body mass index is 45 93 kg/m²  The BMI is above normal  Nutrition recommendations include decreasing portion sizes and encouraging healthy choices of fruits and vegetables  Exercise recommendations include moderate physical activity 150 minutes/week  Rationale for BMI follow-up plan is due to patient being overweight or obese  Depression Screening and Follow-up Plan: Patient was screened for depression during today's encounter  They screened negative with a PHQ-2 score of 0  Return in about 3 months (around 5/9/2023)  No problem-specific Assessment & Plan notes found for this encounter  Diagnoses and all orders for this visit:    Type 2 diabetes mellitus with hyperglycemia, unspecified whether long term insulin use (HCC)  -     IRIS Diabetic eye exam  -     POCT urine microalbumin/creatinine  -     CBC and differential; Future  -     TSH, 3rd generation with Free T4 reflex; Future  -     Comprehensive metabolic panel; Future  -     Hemoglobin A1C; Future  -     Hemoglobin A1C; Future    Iron deficiency anemia due to chronic blood loss    Hypertriglyceridemia  -     Lipid Panel with Direct LDL reflex; Future    Colon cancer screening  -     Cologuard    Hashimoto's thyroiditis  -     levothyroxine 112 mcg tablet; Take one tab po every other day (alt with 110 mcg)  -     levothyroxine 100 mcg tablet; Take one tab po every other day (alt with 112 mcg)  -     TSH, 3rd generation with Free T4 reflex; Future          Subjective:      Patient ID: Janet Ortiz is a 56 y o  female      Here to establish care; former Located within [facility] pt  ALLERGIES:  Allergies   Allergen Reactions   • Iodinated Contrast Media Anaphylaxis     SOB, chest tightness, required epinephrine   • Aspirin Rash     As child        CURRENT MEDICATIONS:    Current Outpatient Medications:   •  Blood Glucose Monitoring Suppl ([employer] Blood Glucose Meter) w/Device KIT, Test blood sugar three times daily  , Disp: 1 kit, Rfl: 2  •  glucose blood test strip, Test blood sugar three times daily  , Disp: 200 strip, Rfl: 5  •  Lancets (LifeScan Unistik 2) MISC, Test blood sugar three times daily  , Disp: 200 each, Rfl: 5  •  levothyroxine 100 mcg tablet, Take one tab po every other day (alt with 112 mcg), Disp: 90 tablet, Rfl: 0  •  levothyroxine 112 mcg tablet, Take one tab po every other day (alt with 110 mcg), Disp: 90 tablet, Rfl: 0  •  naproxen (NAPROSYN) 500 mg tablet, Take 1 tablet (500 mg total) by mouth 2 (two) times a day as needed (pain), Disp: 60 tablet, Rfl: 0    ACTIVE PROBLEM LIST:  Patient Active Problem List   Diagnosis   • Asthma   • Chondromalacia patellae   • Obesity, morbid (HCC)   • Seborrheic keratosis   • Hypertriglyceridemia   • Type 2 diabetes mellitus without complication, without long-term current use of insulin (HCC)   • Hashimoto's thyroiditis   • Menorrhagia with regular cycle   • Vitamin D deficiency   • Iron deficiency anemia due to chronic blood loss   • Family history of lupus erythematosus   • Family history of rheumatoid arthritis   • Arthralgia of both hands   •  abnormal thyroid biopsy Atypia of undetermined significance ([place] Category    • Encounter for screening mammogram for malignant neoplasm of breast   • Type 2 diabetes mellitus with hyperglycemia, unspecified whether long term insulin use (HCC)       PAST MEDICAL HISTORY:  Past Medical History:   Diagnosis Date   • Bell's palsy    • COVID-19 10/20/2020   • Diabetes mellitus during pregnancy    • Hashimoto's disease    • Migraine        PAST SURGICAL HISTORY:  Past Surgical History:   Procedure Laterality Date   •  SECTION     • HYSTEROSCOPY      with resection of intrauterine septum   • US GUIDED THYROID BIOPSY  2019   • US GUIDED THYROID BIOPSY  2019       FAMILY HISTORY:  Family History   Problem Relation Age of Onset   • Migraines Mother    • Heart disease Father         cardiac disorder   • Hyperlipidemia Father    • Hypertension Father    • Breast cancer Maternal Grandmother    • Cervical cancer Maternal Aunt    • Endometrial cancer Maternal Aunt 20   • Breast cancer Paternal Aunt    • Endometrial cancer Paternal Aunt 34   • Colon cancer Neg Hx        SOCIAL HISTORY:  Social History     Socioeconomic History   • Marital status: /Civil Union     Spouse name: Not on file   • Number of children: 2   • Years of education: Not on file   • Highest education level: Not on file   Occupational History   • Occupation: employed     Comment: full-time   Tobacco Use   • Smoking status: Never   • Smokeless tobacco: Never   Vaping Use   • Vaping Use: Never used   Substance and Sexual Activity   • Alcohol use: Yes   • Drug use: Never   • Sexual activity: Yes     Partners: Male     Birth control/protection: Male Sterilization   Other Topics Concern   • Not on file   Social History Narrative    Always uses a seatbelt    Caffeine use    Feels safe at home    Lives with spouse    Recreational activities: walking     Social Determinants of Health     Financial Resource Strain: Not on file   Food Insecurity: Not on file   Transportation Needs: Not on file   Physical Activity: Not on file   Stress: Not on file   Social Connections: Not on file   Intimate Partner Violence: Not on file   Housing Stability: Not on file       Review of Systems   All other systems reviewed and are negative          Objective:  Vitals:    23 1559   BP: 138/92   BP Location: Left arm   Patient Position: Sitting   Cuff Size: Adult   Pulse: (!) 109   Resp: 16   SpO2: 99% Weight: 123 kg (271 lb 12 8 oz)   Height: 5' 4 5" (1 638 m)     Body mass index is 45 93 kg/m²  Physical Exam  Vitals and nursing note reviewed  Constitutional:       Appearance: Normal appearance  She is obese  Cardiovascular:      Rate and Rhythm: Normal rate  Pulmonary:      Effort: Pulmonary effort is normal    Musculoskeletal:         General: Normal range of motion  Cervical back: Normal range of motion  Skin:     General: Skin is warm and dry  Neurological:      General: No focal deficit present  Mental Status: She is alert and oriented to person, place, and time  Psychiatric:         Mood and Affect: Mood normal          Behavior: Behavior normal            RESULTS:    Recent Results (from the past 1008 hour(s))   POCT urine HCG    Collection Time: 01/13/23  7:04 AM   Result Value Ref Range    URINE HCG negative    POCT urine HCG    Collection Time: 01/17/23  3:38 PM   Result Value Ref Range    URINE HCG neg    Hemoglobin A1C    Collection Time: 02/04/23  8:58 AM   Result Value Ref Range    Hemoglobin A1C 8 0 (H) Normal 3 8-5 6%; PreDiabetic 5 7-6 4%;  Diabetic >=6 5%; Glycemic control for adults with diabetes <7 0% %     mg/dl   Basic metabolic panel    Collection Time: 02/04/23  8:58 AM   Result Value Ref Range    Sodium 139 135 - 147 mmol/L    Potassium 3 5 3 5 - 5 3 mmol/L    Chloride 105 96 - 108 mmol/L    CO2 28 21 - 32 mmol/L    ANION GAP 6 4 - 13 mmol/L    BUN 12 5 - 25 mg/dL    Creatinine 0 71 0 60 - 1 30 mg/dL    Glucose, Fasting 226 (H) 65 - 99 mg/dL    Calcium 8 5 8 3 - 10 1 mg/dL    eGFR 102 ml/min/1 73sq m   CBC and Platelet    Collection Time: 02/04/23  8:58 AM   Result Value Ref Range    WBC 6 17 4 31 - 10 16 Thousand/uL    RBC 4 09 3 81 - 5 12 Million/uL    Hemoglobin 10 6 (L) 11 5 - 15 4 g/dL    Hematocrit 34 4 (L) 34 8 - 46 1 %    MCV 84 82 - 98 fL    MCH 25 9 (L) 26 8 - 34 3 pg    MCHC 30 8 (L) 31 4 - 37 4 g/dL    RDW 14 8 11 6 - 15 1 %    Platelets 382 149 - 390 Thousands/uL    MPV 11 7 8 9 - 12 7 fL   ECG 12 lead    Collection Time: 02/04/23  8:59 AM   Result Value Ref Range    Ventricular Rate 91 BPM    Atrial Rate 91 BPM    MS Interval 154 ms    QRSD Interval 84 ms    QT Interval 356 ms    QTC Interval 437 ms    P Axis 42 degrees    QRS Axis -3 degrees    T Wave Axis 4 degrees       This note was created with voice recognition software  Phonic, grammatical and spelling errors may be present within the note as a result

## 2023-02-14 ENCOUNTER — ANESTHESIA EVENT (OUTPATIENT)
Dept: PERIOP | Facility: HOSPITAL | Age: 48
End: 2023-02-14

## 2023-02-20 NOTE — PRE-PROCEDURE INSTRUCTIONS
Pre-Surgery Instructions:   Medication Instructions   • levothyroxine 100 mcg tablet Take day of surgery  • levothyroxine 112 mcg tablet Hold day of surgery  • naproxen (NAPROSYN) 500 mg tablet Stop taking 7 days prior to surgery  Pre-op medication, and showering instructions with antibacteral soap reviewed  Pt  Verbalized understanding of current visitor restrictions  Pt  Verbalized an understanding of all instructions reviewed and offers no concerns at this time  Instructed to avoid all ASA/NSAIDs and OTC Vit/Supp from now until after surgery per anesthesia guidelines   Tylenol ok prn  DOS meds with a few sips of H2O

## 2023-02-24 ENCOUNTER — HOSPITAL ENCOUNTER (OUTPATIENT)
Facility: HOSPITAL | Age: 48
Setting detail: OUTPATIENT SURGERY
Discharge: HOME | End: 2023-02-24
Attending: STUDENT IN AN ORGANIZED HEALTH CARE EDUCATION/TRAINING PROGRAM | Admitting: STUDENT IN AN ORGANIZED HEALTH CARE EDUCATION/TRAINING PROGRAM

## 2023-02-24 ENCOUNTER — ANESTHESIA (OUTPATIENT)
Dept: PERIOP | Facility: HOSPITAL | Age: 48
End: 2023-02-24

## 2023-02-24 VITALS
DIASTOLIC BLOOD PRESSURE: 99 MMHG | HEIGHT: 65 IN | TEMPERATURE: 98.5 F | WEIGHT: 273.59 LBS | SYSTOLIC BLOOD PRESSURE: 144 MMHG | BODY MASS INDEX: 45.58 KG/M2 | RESPIRATION RATE: 17 BRPM | OXYGEN SATURATION: 98 % | HEART RATE: 79 BPM

## 2023-02-24 DIAGNOSIS — N92.0 MENORRHAGIA WITH REGULAR CYCLE: ICD-10-CM

## 2023-02-24 LAB
EXT PREGNANCY TEST URINE: NEGATIVE
EXT. CONTROL: NORMAL
GLUCOSE SERPL-MCNC: 202 MG/DL (ref 65–140)

## 2023-02-24 RX ORDER — FENTANYL CITRATE 50 UG/ML
INJECTION, SOLUTION INTRAMUSCULAR; INTRAVENOUS AS NEEDED
Status: DISCONTINUED | OUTPATIENT
Start: 2023-02-24 | End: 2023-02-24

## 2023-02-24 RX ORDER — ONDANSETRON 2 MG/ML
INJECTION INTRAMUSCULAR; INTRAVENOUS AS NEEDED
Status: DISCONTINUED | OUTPATIENT
Start: 2023-02-24 | End: 2023-02-24

## 2023-02-24 RX ORDER — PROMETHAZINE HYDROCHLORIDE 25 MG/ML
12.5 INJECTION, SOLUTION INTRAMUSCULAR; INTRAVENOUS ONCE AS NEEDED
Status: DISCONTINUED | OUTPATIENT
Start: 2023-02-24 | End: 2023-02-24 | Stop reason: HOSPADM

## 2023-02-24 RX ORDER — KETOROLAC TROMETHAMINE 30 MG/ML
INJECTION, SOLUTION INTRAMUSCULAR; INTRAVENOUS AS NEEDED
Status: DISCONTINUED | OUTPATIENT
Start: 2023-02-24 | End: 2023-02-24

## 2023-02-24 RX ORDER — LIDOCAINE HYDROCHLORIDE 20 MG/ML
INJECTION, SOLUTION EPIDURAL; INFILTRATION; INTRACAUDAL; PERINEURAL AS NEEDED
Status: DISCONTINUED | OUTPATIENT
Start: 2023-02-24 | End: 2023-02-24

## 2023-02-24 RX ORDER — LIDOCAINE HYDROCHLORIDE 10 MG/ML
0.5 INJECTION, SOLUTION EPIDURAL; INFILTRATION; INTRACAUDAL; PERINEURAL ONCE AS NEEDED
Status: DISCONTINUED | OUTPATIENT
Start: 2023-02-24 | End: 2023-02-24 | Stop reason: HOSPADM

## 2023-02-24 RX ORDER — MIDAZOLAM HYDROCHLORIDE 2 MG/2ML
INJECTION, SOLUTION INTRAMUSCULAR; INTRAVENOUS AS NEEDED
Status: DISCONTINUED | OUTPATIENT
Start: 2023-02-24 | End: 2023-02-24

## 2023-02-24 RX ORDER — DEXAMETHASONE SODIUM PHOSPHATE 10 MG/ML
INJECTION, SOLUTION INTRAMUSCULAR; INTRAVENOUS AS NEEDED
Status: DISCONTINUED | OUTPATIENT
Start: 2023-02-24 | End: 2023-02-24

## 2023-02-24 RX ORDER — ONDANSETRON 2 MG/ML
4 INJECTION INTRAMUSCULAR; INTRAVENOUS ONCE AS NEEDED
Status: DISCONTINUED | OUTPATIENT
Start: 2023-02-24 | End: 2023-02-24 | Stop reason: HOSPADM

## 2023-02-24 RX ORDER — FENTANYL CITRATE/PF 50 MCG/ML
25 SYRINGE (ML) INJECTION
Status: DISCONTINUED | OUTPATIENT
Start: 2023-02-24 | End: 2023-02-24 | Stop reason: HOSPADM

## 2023-02-24 RX ORDER — PROPOFOL 10 MG/ML
INJECTION, EMULSION INTRAVENOUS AS NEEDED
Status: DISCONTINUED | OUTPATIENT
Start: 2023-02-24 | End: 2023-02-24

## 2023-02-24 RX ORDER — MAGNESIUM HYDROXIDE 1200 MG/15ML
LIQUID ORAL AS NEEDED
Status: DISCONTINUED | OUTPATIENT
Start: 2023-02-24 | End: 2023-02-24 | Stop reason: HOSPADM

## 2023-02-24 RX ORDER — SODIUM CHLORIDE, SODIUM LACTATE, POTASSIUM CHLORIDE, CALCIUM CHLORIDE 600; 310; 30; 20 MG/100ML; MG/100ML; MG/100ML; MG/100ML
50 INJECTION, SOLUTION INTRAVENOUS CONTINUOUS
Status: DISCONTINUED | OUTPATIENT
Start: 2023-02-24 | End: 2023-02-24 | Stop reason: HOSPADM

## 2023-02-24 RX ADMIN — DEXAMETHASONE SODIUM PHOSPHATE 4 MG: 10 INJECTION, SOLUTION INTRAMUSCULAR; INTRAVENOUS at 07:57

## 2023-02-24 RX ADMIN — MIDAZOLAM 2 MG: 1 INJECTION INTRAMUSCULAR; INTRAVENOUS at 07:47

## 2023-02-24 RX ADMIN — KETOROLAC TROMETHAMINE 30 MG: 30 INJECTION, SOLUTION INTRAMUSCULAR at 08:25

## 2023-02-24 RX ADMIN — FENTANYL CITRATE 50 MCG: 50 INJECTION INTRAMUSCULAR; INTRAVENOUS at 07:50

## 2023-02-24 RX ADMIN — ONDANSETRON 4 MG: 2 INJECTION INTRAMUSCULAR; INTRAVENOUS at 07:58

## 2023-02-24 RX ADMIN — FENTANYL CITRATE 25 MCG: 50 INJECTION INTRAMUSCULAR; INTRAVENOUS at 08:12

## 2023-02-24 RX ADMIN — LIDOCAINE HYDROCHLORIDE 100 MG: 20 INJECTION, SOLUTION EPIDURAL; INFILTRATION; INTRACAUDAL at 07:52

## 2023-02-24 RX ADMIN — FENTANYL CITRATE 25 MCG: 50 INJECTION INTRAMUSCULAR; INTRAVENOUS at 08:03

## 2023-02-24 RX ADMIN — PROPOFOL 200 MG: 10 INJECTION, EMULSION INTRAVENOUS at 07:54

## 2023-02-24 RX ADMIN — SODIUM CHLORIDE, SODIUM LACTATE, POTASSIUM CHLORIDE, AND CALCIUM CHLORIDE 50 ML/HR: .6; .31; .03; .02 INJECTION, SOLUTION INTRAVENOUS at 07:21

## 2023-02-24 NOTE — H&P
H&P Exam - Gynecology   Indra Shelley 56 y o  female MRN: [id number]  Unit/Bed#: OR POOL Encounter: [id number]    Assessment/Plan     Assessment:  57 yo F - abnormal uterine bleeding  Plan:  Proceed with hysteroscopy, D&C  History of Present Illness   HX and PE limited by:   HPI:  Indra Shelley is a 56 y o  female who presents for D&C, hysteroscopy  She has a h/o of heavy menses  She has regular cycles but often is bleeding through maxi pads in 30-60 minutes and has bled through her clothes on multiple occasions  EMB was attempted x2 but was not successful       Review of Systems    Historical Information   Past Medical History:   Diagnosis Date   • Anemia    • Anesthesia     Cardiac event   • Asthma    • Bell's palsy    • COVID-19 10/20/2020   • Diabetes mellitus ([facility] [address] )     borderline-diet controlled   • Diabetes mellitus during pregnancy    • Disease of thyroid gland    • Hashimoto's disease    • Migraine      Past Surgical History:   Procedure Laterality Date   •  SECTION     • HYSTEROSCOPY      with resection of intrauterine septum   • US GUIDED THYROID BIOPSY  2019   • US GUIDED THYROID BIOPSY  2019     OB/GYN History  Family History   Problem Relation Age of Onset   • Migraines Mother    • Heart disease Father         cardiac disorder   • Hyperlipidemia Father    • Hypertension Father    • Breast cancer Maternal Grandmother    • Cervical cancer Maternal Aunt    • Endometrial cancer Maternal Aunt 20   • Breast cancer Paternal Aunt    • Endometrial cancer Paternal Aunt 34   • Colon cancer Neg Hx      Social History   Social History     Substance and Sexual Activity   Alcohol Use Yes    Comment: monthly     Social History     Substance and Sexual Activity   Drug Use Never     Social History     Tobacco Use   Smoking Status Never   Smokeless Tobacco Never     E-Cigarette/Vaping   • E-Cigarette Use Never User      E-Cigarette/Vaping Substances   • Nicotine No    • THC No    • CBD No    • Flavoring No    • Other No    • Unknown No        Meds/Allergies   all current active meds have been reviewed  Allergies   Allergen Reactions   • Iodinated Contrast Media Anaphylaxis     SOB, chest tightness, required epinephrine   • Aspirin Rash     As child        Objective   Vitals: Blood pressure 134/91, pulse 95, temperature (!) 96 6 °F (35 9 °C), temperature source Temporal, resp  rate 18, height 5' 5" (1 651 m), weight 124 kg (273 lb 9 5 oz), SpO2 98 %  No intake or output data in the 24 hours ending 02/24/23 0736    Invasive Devices: Invasive Devices     Peripheral Intravenous Line  Duration           Peripheral IV 02/24/23 Right Antecubital <1 day                Physical Exam  Vitals reviewed  Cardiovascular:      Pulses: Normal pulses  Heart sounds: Normal heart sounds  Pulmonary:      Effort: Pulmonary effort is normal       Breath sounds: Normal breath sounds  Abdominal:      Tenderness: There is no abdominal tenderness  There is no guarding  Musculoskeletal:      Right lower leg: No edema  Left lower leg: No edema  Neurological:      Mental Status: She is alert and oriented to person, place, and time  Psychiatric:         Behavior: Behavior normal          Lab Results: I have personally reviewed pertinent reports  Imaging: I have personally reviewed pertinent reports  EKG, Pathology, and Other Studies: I have personally reviewed pertinent reports        Code Status: No Order  Advance Directive and Living Will:      Power of :    POLST:

## 2023-02-24 NOTE — OP NOTE
OPERATIVE REPORT  PATIENT NAME: Keke Lion    :  1975  MRN: [id number]  Pt Location: [facility] OR ROOM 02    SURGERY DATE: 2023    Surgeon(s) and Role:     * Joshua Jaime MD - Primary    Preop Diagnosis:  Menorrhagia with regular cycle [N92 0]    Post-Op Diagnosis Codes:     * Menorrhagia with regular cycle [N92 0]    Procedure(s):  DILATATION AND CURETTAGE (D&C) WITH HYSTEROSCOPY    Specimen(s):  ID Type Source Tests Collected by Time Destination   1 : Endometrial Curettings Tissue Endometrium TISSUE EXAM Joshua Jaime MD 2023 0719    2 : Endometrial Polyp and Curettings Tissue Endometrium TISSUE EXAM Joshua Jaime MD 2023 8320        Estimated Blood Loss:   Minimal    Drains:  * No LDAs found *    Anesthesia Type:   General    Operative Indications:  Menorrhagia with regular cycle [N92 0]      Operative Findings:  Normal appearing external genitalia  B/l tubal ostia visualized  Small polypoid structure at fundus  Proliferative appearing endometrial tissue  Cervical polyp    Complications:   None    Procedure and Technique:  The patient was taken to the operating room where general anesthesia was obtained  She was positioned in dorsal lithotomy position ensuring that all of her pressure points were properly padded  She was prepped and draped in a sterile fashion  Next, a surgical timeout was performed  The patient and procedure to be performed were properly identified  A sterile speculum was placed in the vagina  The anterior lip of the cervix was grasped with a single-tooth tenaculum and the cervix was dilated up to 21 [place]  Next, the hysteroscope passed through the cervix  Visualization of the endometrial cavity was completed and the findings above were noted  The symphion device was used to perform polypectomy as well as general sampling of endometrial tissue   The cavity was holding pressure and there were not noted to be any perforations visualized  No suspicion for a rapidly increasing deficit  The scope was removed  A circumferential curetting was then performed  The tenaculum was removed and site was hemostatic  Speculum was removed  Counts were correct x 2  The patient was woken up from anesthesia  The patient was transferred to the PACU in stable condition         I was present for the entire procedure    Patient Disposition:  PACU         SIGNATURE: Elva Forde MD  DATE: February 24, 2023  TIME: 8:44 AM

## 2023-02-24 NOTE — ANESTHESIA POSTPROCEDURE EVALUATION
Post-Op Assessment Note    CV Status:  Stable  Pain Score: 1    Pain management: adequate     Mental Status:  Alert and awake   Hydration Status:  Euvolemic   PONV Controlled:  Controlled   Airway Patency:  Patent      Post Op Vitals Reviewed: Yes      Staff: CRNA         No notable events documented      BP   159/81   Temp 98 1   Pulse 79   Resp 18   SpO2 100% 3L FM

## 2023-02-24 NOTE — ANESTHESIA PREPROCEDURE EVALUATION
Procedure:  DILATATION AND CURETTAGE (D&C) WITH HYSTEROSCOPY (Uterus)    Relevant Problems   CARDIO   (+) Hypertriglyceridemia      ENDO   (+) Type 2 diabetes mellitus without complication, without long-term current use of insulin (HCC)      HEMATOLOGY   (+) Iron deficiency anemia due to chronic blood loss      PULMONARY   (+) Asthma      Endocrine   (+) Hashimoto's thyroiditis      Other   (+) Obesity, morbid ([address] )      Denies recent fever, cough or other symptom of upper respiratory tract infection  Confirmed NPO appropriate    Physical Exam    Airway    Mallampati score: III  TM Distance: >3 FB  Neck ROM: full     Dental   No notable dental hx     Cardiovascular      Pulmonary      Other Findings        Anesthesia Plan  ASA Score- 3     Anesthesia Type- general with ASA Monitors  Additional Monitors:   Airway Plan: LMA  Plan Factors-Exercise tolerance (METS): >4 METS  Exercise comment: Able to climb two flights of stairs without cardiopulmonary limitation  Chart reviewed  EKG reviewed  Existing labs reviewed  Patient summary reviewed  Patient is not a current smoker  Patient did not smoke on day of surgery  Induction- intravenous  Postoperative Plan- Plan for postoperative opioid use  Planned trial extubation    Informed Consent- Anesthetic plan and risks discussed with patient

## 2023-03-23 ENCOUNTER — OFFICE VISIT (OUTPATIENT)
Dept: OBGYN CLINIC | Facility: MEDICAL CENTER | Age: 48
End: 2023-03-23

## 2023-03-23 VITALS
HEIGHT: 65 IN | DIASTOLIC BLOOD PRESSURE: 100 MMHG | BODY MASS INDEX: 45.45 KG/M2 | SYSTOLIC BLOOD PRESSURE: 160 MMHG | WEIGHT: 272.8 LBS

## 2023-03-23 DIAGNOSIS — N93.9 ABNORMAL UTERINE BLEEDING (AUB): Primary | ICD-10-CM

## 2023-03-23 NOTE — PROGRESS NOTES
Assessment/Plan:      Diagnoses and all orders for this visit:    Abnormal uterine bleeding (AUB)  Recovered well from D&C  Benign path  Would like to proceed with next step - prefers hysterectomy w/ ovarian conservation  We discussed risks/benefits and pros/cons of ablation and hysterectomy at length  Will schedule pre-op with [facility]  Subjective:     Patient ID: Dalton Cook is a 53 y o  female  54 yo F presents for a postop after having a D&C done on 2/24  A small polyp was removed  All path returned benign  Denies discharge, odor, or pain  She would like to discuss next steps  We previously discussed ablation vs  Hysterectomy  She did start her menses last week and doesn't notice much improvement after polypectomy  Her Grandmother, 80, recently diagnosed with pelvic masses which is part of the reasons she's leaning towards hysterectomy  Review of Systems   All other systems reviewed and are negative  Objective:    Vitals:    03/23/23 1417   BP: 160/100          Physical Exam  Vitals reviewed  Pulmonary:      Effort: Pulmonary effort is normal    Neurological:      Mental Status: She is alert and oriented to person, place, and time     Psychiatric:         Behavior: Behavior normal

## 2023-05-19 NOTE — PROGRESS NOTES
Assessment/Plan:    Menorrhagia with regular cycle  Options discussed:  Medications- hormonal treatments, TXA, IUD with progestin  Sugery: D&C with ablation, hysterectomy    Patient wants definitive treatment  Has family history ( paternal side) of ovarian and uterine cancer  Interested in surgery  Plan for Total Laparoscopic Hysterectomy with bilateral salpingoophorectomy    We reviewed the risks of the surgery including but not limited to infection, bleeding, injury to nearby organs (bowel  bladder, ureter, blood vessel, nerve) and possible long term consequences of such injury, as well as possibility of oopherectomy, conversion to laparotomy, need for blood transfusion and other resuscitative measures  Diagnoses and all orders for this visit:    Menorrhagia with regular cycle          Subjective:      Patient ID: Hiwot Mcconnell is a 53 y o  female  Patient here for hysterectomy consult  Patient seen 3/23 for follow up from [facility]  procedure 2/24/23 to review pathology (Benign) and next steps  Has not noticed much improvement with menses since polypectomy  Discussed hysterectomy vs ablation at length with Dr Steffany Freed at this appt  Leaning towards hysterectomy due to family hx of pelvic masses in grandmother  49yo G3P 2 0 1 2 with menorrhagia  For over a year menses have been increasing in flow  Lasting up to 10-12 days  Using overnight pads with change every 1-2 hours  Has been evaluated by colleagues- had an attempt at EB and IUD insertion which were unsuccessful  D&C for polyp removal   Wants definitive treatment  H/o csection x 2   Pelvic ultrasound reviewed  EB/D&C pathology reviewed  Ovaries: discussed premature menopause and implications for early heart disease, osteoporosis, shorter life expectancy  ALso discussed possible need for repeat surgery in future for removal if concern for ovaries in the future  Also reviewed ERT for treatment of menopausal symptoms   Plan for removal of ovaries  Patient has concerns due to family history of ovarian cancer  Gynecologic Exam  She complains of vaginal bleeding  She reports no genital itching, genital lesions, genital odor, genital rash, pelvic pain or vaginal discharge  This is a chronic problem  The current episode started more than 1 year ago  The problem occurs intermittently  The problem has been gradually worsening since onset  The patient is experiencing no pain  Pertinent negatives include no chills, constipation, diarrhea, dysuria, fever, flank pain, frequency, hematuria, nausea, sore throat, urgency or vomiting  The vaginal bleeding is heavier than menses  Patient has been passing clots  Patient has been passing tissue  The symptoms are aggravated by activity and heavy lifting  Past treatments include heating pads and NSAIDs  The treatment provided no relief  She is sexually active  The patient's menstrual history has been regular  Her past medical history is significant for a gynecological surgery  The following portions of the patient's history were reviewed and updated as appropriate:   She  has a past medical history of Anemia, Anesthesia, Asthma, Bell's palsy, COVID-19 (10/20/2020), Diabetes mellitus ([facility] [address] ), Diabetes mellitus during pregnancy, Disease of thyroid gland, Hashimoto's disease, and Migraine    She   Patient Active Problem List    Diagnosis Date Noted   • Type 2 diabetes mellitus with hyperglycemia, unspecified whether long term insulin use ([address] ) 02/09/2023   • Iron deficiency anemia due to chronic blood loss 09/12/2019   • Family history of lupus erythematosus 09/12/2019   • Family history of rheumatoid arthritis 09/12/2019   • Arthralgia of both hands 09/12/2019   •  abnormal thyroid biopsy Atypia of undetermined significance ([place] [facility]  09/12/2019   • Vitamin D deficiency 06/07/2019   • Menorrhagia with regular cycle 06/03/2019   • Hashimoto's thyroiditis 05/22/2019   • Hypertriglyceridemia 2018   • Type 2 diabetes mellitus without complication, without long-term current use of insulin ([facility] ) 2018   • Obesity, morbid ([facility] ) 2017   • Asthma 2015   • Chondromalacia patellae 2015   • Seborrheic keratosis 2015     She  has a past surgical history that includes  section; Hysteroscopy; US guided thyroid biopsy (2019); US guided thyroid biopsy (2019); and pr hysteroscopy bx endometrium&/polypc w/wo d&c (N/A, 2023)  Her family history includes Breast cancer in her maternal grandmother and paternal aunt; Cancer in her paternal grandmother; Cervical cancer in her maternal aunt and maternal grandmother; Endometrial cancer (age of onset: 21) in her maternal aunt; Endometrial cancer (age of onset: 27) in her paternal aunt; Heart disease in her father; Hyperlipidemia in her father; Hypertension in her father; Migraines in her mother; Ovarian cancer in her paternal aunt and paternal grandmother; Uterine cancer in her paternal aunt  She  reports that she has never smoked  She has never used smokeless tobacco  She reports current alcohol use  She reports that she does not use drugs  Current Outpatient Medications   Medication Sig Dispense Refill   • Blood Glucose Monitoring Suppl ([employer] Blood Glucose Meter) w/Device KIT Test blood sugar three times daily  1 kit 2   • glucose blood test strip Test blood sugar three times daily  200 strip 5   • Lancets (LifeScan Unistik 2) MISC Test blood sugar three times daily  200 each 5   • levothyroxine 100 mcg tablet Take one tab po every other day (alt with 112 mcg) 90 tablet 0   • levothyroxine 112 mcg tablet Take one tab po every other day (alt with 110 mcg) 90 tablet 0   • naproxen (NAPROSYN) 500 mg tablet Take 1 tablet (500 mg total) by mouth 2 (two) times a day as needed (pain) (Patient not taking: Reported on 3/23/2023) 60 tablet 0     No current facility-administered medications for this visit       Current Outpatient "Medications on File Prior to Visit   Medication Sig   • Blood Glucose Monitoring Suppl ([employer] Blood Glucose Meter) w/Device KIT Test blood sugar three times daily  • glucose blood test strip Test blood sugar three times daily  • Lancets (LifeScan Unistik 2) MISC Test blood sugar three times daily  • levothyroxine 100 mcg tablet Take one tab po every other day (alt with 112 mcg)   • levothyroxine 112 mcg tablet Take one tab po every other day (alt with 110 mcg)   • naproxen (NAPROSYN) 500 mg tablet Take 1 tablet (500 mg total) by mouth 2 (two) times a day as needed (pain) (Patient not taking: Reported on 3/23/2023)     No current facility-administered medications on file prior to visit  She is allergic to iodinated contrast media and aspirin       Review of Systems   Constitutional: Positive for fatigue  Negative for activity change, appetite change, chills and fever  HENT: Negative for rhinorrhea, sneezing and sore throat  Eyes: Negative for visual disturbance  Respiratory: Negative for cough, shortness of breath and wheezing  Cardiovascular: Negative for chest pain, palpitations and leg swelling  Gastrointestinal: Negative for abdominal distention, constipation, diarrhea, nausea and vomiting  Genitourinary: Positive for menstrual problem and vaginal bleeding  Negative for decreased urine volume, difficulty urinating, dyspareunia, dysuria, flank pain, frequency, genital sores, hematuria, pelvic pain, urgency, vaginal discharge and vaginal pain  Neurological: Negative for syncope and light-headedness  Objective:      /86 (BP Location: Left arm, Patient Position: Sitting, Cuff Size: Standard)   Ht 5' 6\" (1 676 m)   Wt 124 kg (272 lb 12 8 oz)   LMP 05/12/2023 (Exact Date)   BMI 44 03 kg/m²          Physical Exam  Constitutional:       General: She is not in acute distress  Appearance: She is well-developed  She is not diaphoretic     Neck:      Vascular: No JVD  " Cardiovascular:      Rate and Rhythm: Normal rate and regular rhythm  Heart sounds: Normal heart sounds  No murmur heard  No friction rub  No gallop  Pulmonary:      Effort: Pulmonary effort is normal  No respiratory distress  Breath sounds: Normal breath sounds  Abdominal:      General: Bowel sounds are normal  There is no distension  Palpations: Abdomen is soft  Tenderness: There is no abdominal tenderness  There is no guarding or rebound  Genitourinary:     Labia:         Right: No rash, tenderness or lesion  Left: No rash, tenderness or lesion  Vagina: Normal  No vaginal discharge, erythema or tenderness  Cervix: No cervical motion tenderness, discharge or friability  Uterus: Not deviated, not enlarged, not fixed and not tender  Adnexa:         Right: No mass, tenderness or fullness  Left: No mass, tenderness or fullness  Musculoskeletal:      Cervical back: Neck supple  Right lower leg: No edema  Left lower leg: No edema  Lymphadenopathy:      Cervical: No cervical adenopathy  Neurological:      Mental Status: She is alert and oriented to person, place, and time  Deep Tendon Reflexes: Reflexes are normal and symmetric  I have spent a total time of 45 minutes on 05/22/23 in caring for this patient including Diagnostic results, Prognosis, Risks and benefits of tx options, Patient and family education, Counseling / Coordination of care, Documenting in the medical record and Reviewing / ordering tests, medicine, procedures

## 2023-05-22 ENCOUNTER — OFFICE VISIT (OUTPATIENT)
Dept: OBGYN CLINIC | Facility: CLINIC | Age: 48
End: 2023-05-22

## 2023-05-22 VITALS
BODY MASS INDEX: 43.84 KG/M2 | SYSTOLIC BLOOD PRESSURE: 116 MMHG | DIASTOLIC BLOOD PRESSURE: 86 MMHG | WEIGHT: 272.8 LBS | HEIGHT: 66 IN

## 2023-05-22 DIAGNOSIS — N92.0 MENORRHAGIA WITH REGULAR CYCLE: Primary | ICD-10-CM

## 2023-05-22 PROBLEM — Z12.31 ENCOUNTER FOR SCREENING MAMMOGRAM FOR MALIGNANT NEOPLASM OF BREAST: Status: RESOLVED | Noted: 2022-10-19 | Resolved: 2023-05-22

## 2023-05-22 NOTE — ASSESSMENT & PLAN NOTE
Options discussed:  Medications- hormonal treatments, TXA, IUD with progestin  Reny: D&C with ablation, hysterectomy    Patient wants definitive treatment  Has family history ( paternal side) of ovarian and uterine cancer  Interested in surgery  Plan for Total Laparoscopic Hysterectomy with bilateral salpingoophorectomy    We reviewed the risks of the surgery including but not limited to infection, bleeding, injury to nearby organs (bowel  bladder, ureter, blood vessel, nerve) and possible long term consequences of such injury, as well as possibility of oopherectomy, conversion to laparotomy, need for blood transfusion and other resuscitative measures

## 2023-05-22 NOTE — PATIENT INSTRUCTIONS
Menopause   WHAT YOU NEED TO KNOW:   What is menopause? Menopause is a normal stage in a woman's life when her monthly periods stop  You are considered to be in menopause when you have not had a period for a full year after the age of 36  Menopause usually occurs between ages 52 to 48  Perimenopause is a stage before menopause that may cause signs and symptoms similar to menopause  Perimenopause may start about 4 years before menopause  What causes menopause? Menopause starts when the ovaries stop making the female hormones estrogen and progesterone  After menopause, you are no longer able to become pregnant  Any of the following may trigger menopause or early menopause:  Surgery, including a hysterectomy or oophorectomy    Family history of early menopause    Smoking    Chemotherapy or pelvic radiation    Chromosome abnormalities, including Yu syndrome and Fragile X syndrome    Premature ovarian insufficiency (the ovaries stop producing eggs before age 36)    What are the signs and symptoms of menopause? You may have any of the following:  Irregular menstrual cycles with heavy vaginal bleeding followed by decreased bleeding until it stops    Hot flashes (feeling warm, flushed, and sweaty)    Vaginal changes such as increased dryness    Mood changes such as anxiety, depression, or decreased desire to have sex    Trouble sleeping, joint pain, headaches    Brittle nails, hair on chin or chest where it is normally absent    Decrease in breast size and change in skin texture    Weight gain    How is menopause treated or managed? Hormone replacement therapy (HRT) is medicine that replaces your low hormone levels  HRT contains estrogen and sometimes progestin  HRT has several benefits  HRT helps prevent osteoporosis, which decreases your risk for bone fractures  HRT also protects you from colorectal cancer  HRT also has some risks    HRT increases your risk for breast cancer, blood clots, heart disease, a heart attack, or a stroke  If you are 72 years or older, HRT can also increase your risk for dementia  Your risk for uterine or endometrial cancer is higher if you take estrogen-only HRT  Manage hot flashes  Hot flashes are brief periods of feeling very warm, flushed, and sweaty  Hot flashes can last from a few seconds to several minutes  They may happen many times during the day, and are common at night  Layer your clothing so that you can easily remove some clothing and cool yourself during a hot flash  Cold drinks may also be helpful  Non-hormone medicines can help relieve or prevent hot flashes  Examples include certain antidepressants, nerve medicines, and high blood pressure medicines  Reduce vaginal dryness by using over-the-counter vaginal creams  Vaginal dryness may cause you to have pain or discomfort during sex  Only use creams that are made for vaginal use  Do  not  use petroleum jelly  You may put an estrogen cream in and around your vagina  Estrogen cream may help decrease vaginal dryness and lower your risk of vaginal infections  Continue to use birth control during perimenopause if you do not want to get pregnant  You may need to use birth control until it has been 1 year since your periods stopped  Ask your healthcare provider when you can stop using birth control to prevent pregnancy  How can I live a healthy lifestyle during and after menopause? After menopause, your risk for heart disease and bone loss increases  Ask about these and other ways to stay healthy:  Exercise regularly  Exercise helps you maintain a healthy weight  Exercise can also help to control your blood pressure and cholesterol levels  Include weight-bearing exercise for strong bones  Weight bearing exercise is recommended for at least 30 minutes, 3 times a week  Ask your healthcare provider about the best exercise plan for you  Eat a variety of healthy foods    Include fruits, vegetables, whole grains (whole-wheat bread, pasta, and cereals), low-fat dairy, and lean protein foods (beans, poultry, and fish)  Limit foods high in sodium (salt)  Ask your healthcare provider for more information about a meal plan that is right for you  Do not smoke  If you smoke, it is never too late to quit  You are more likely to have a heart attack, lung disease, blood clots, and cancer if you smoke  Ask your healthcare provider for information if you need help quitting  Take supplements as directed  You may need extra calcium and vitamin D to help prevent osteoporosis  Limit alcohol and caffeine  Alcohol and caffeine may worsen your symptoms  When should I call my doctor? You have vaginal bleeding after menopause  You have questions or concerns about your condition or care  CARE AGREEMENT:   You have the right to help plan your care  Learn about your health condition and how it may be treated  Discuss treatment options with your healthcare providers to decide what care you want to receive  You always have the right to refuse treatment  The above information is an  only  It is not intended as medical advice for individual conditions or treatments  Talk to your doctor, nurse or pharmacist before following any medical regimen to see if it is safe and effective for you  © Copyright [employer] 2022 Information is for End User's use only and may not be sold, redistributed or otherwise used for commercial purposes

## 2023-06-28 ENCOUNTER — LAB (OUTPATIENT)
Dept: LAB | Facility: HOSPITAL | Age: 48
End: 2023-06-28
Payer: COMMERCIAL

## 2023-06-28 DIAGNOSIS — Z01.818 PRE-OP TESTING: ICD-10-CM

## 2023-06-28 LAB
ABO GROUP BLD: NORMAL
ANION GAP SERPL CALCULATED.3IONS-SCNC: 4 MMOL/L
BLD GP AB SCN SERPL QL: NEGATIVE
BUN SERPL-MCNC: 14 MG/DL (ref 5–25)
CALCIUM SERPL-MCNC: 9.2 MG/DL (ref 8.4–10.2)
CHLORIDE SERPL-SCNC: 102 MMOL/L (ref 96–108)
CO2 SERPL-SCNC: 29 MMOL/L (ref 21–32)
CREAT SERPL-MCNC: 0.68 MG/DL (ref 0.6–1.3)
ERYTHROCYTE [DISTWIDTH] IN BLOOD BY AUTOMATED COUNT: 15.5 % (ref 11.6–15.1)
EST. AVERAGE GLUCOSE BLD GHB EST-MCNC: 189 MG/DL
GFR SERPL CREATININE-BSD FRML MDRD: 104 ML/MIN/1.73SQ M
GLUCOSE P FAST SERPL-MCNC: 234 MG/DL (ref 65–99)
HBA1C MFR BLD: 8.2 %
HCT VFR BLD AUTO: 34.9 % (ref 34.8–46.1)
HGB BLD-MCNC: 10.6 G/DL (ref 11.5–15.4)
MCH RBC QN AUTO: 25.1 PG (ref 26.8–34.3)
MCHC RBC AUTO-ENTMCNC: 30.4 G/DL (ref 31.4–37.4)
MCV RBC AUTO: 83 FL (ref 82–98)
PLATELET # BLD AUTO: 275 THOUSANDS/UL (ref 149–390)
PMV BLD AUTO: 10.7 FL (ref 8.9–12.7)
POTASSIUM SERPL-SCNC: 4.5 MMOL/L (ref 3.5–5.3)
RBC # BLD AUTO: 4.22 MILLION/UL (ref 3.81–5.12)
RH BLD: POSITIVE
SODIUM SERPL-SCNC: 135 MMOL/L (ref 135–147)
SPECIMEN EXPIRATION DATE: NORMAL
WBC # BLD AUTO: 8.29 THOUSAND/UL (ref 4.31–10.16)

## 2023-06-28 PROCEDURE — 86901 BLOOD TYPING SEROLOGIC RH(D): CPT

## 2023-06-28 PROCEDURE — 36415 COLL VENOUS BLD VENIPUNCTURE: CPT

## 2023-06-28 PROCEDURE — 86850 RBC ANTIBODY SCREEN: CPT

## 2023-06-28 PROCEDURE — 83036 HEMOGLOBIN GLYCOSYLATED A1C: CPT

## 2023-06-28 PROCEDURE — 3052F HG A1C>EQUAL 8.0%<EQUAL 9.0%: CPT | Performed by: OBSTETRICS & GYNECOLOGY

## 2023-06-28 PROCEDURE — 85027 COMPLETE CBC AUTOMATED: CPT

## 2023-06-28 PROCEDURE — 87086 URINE CULTURE/COLONY COUNT: CPT

## 2023-06-28 PROCEDURE — 86900 BLOOD TYPING SEROLOGIC ABO: CPT

## 2023-06-28 PROCEDURE — 80048 BASIC METABOLIC PNL TOTAL CA: CPT

## 2023-06-29 ENCOUNTER — TELEPHONE (OUTPATIENT)
Dept: INTERNAL MEDICINE CLINIC | Facility: CLINIC | Age: 48
End: 2023-06-29

## 2023-06-29 NOTE — TELEPHONE ENCOUNTER
Patient is having a hysterectomy done on July 18 th and her OB?GYN just called patient and told her that her A1C was high @ 8.2 and that she needed a note from her PCP to say it is ok to go ahead with the surgery? Please advise. .. Jaylin Jim Does patient need an apt to see you for this?

## 2023-06-29 NOTE — RESULT ENCOUNTER NOTE
Talked to patient she is aware of her results, I instructed her to reach out to her PCP to follow up with HbA1c results as well.

## 2023-06-30 LAB
BACTERIA UR CULT: NORMAL
BACTERIA UR CULT: NORMAL

## 2023-07-03 DIAGNOSIS — R73.09 ELEVATED HEMOGLOBIN A1C: Primary | ICD-10-CM

## 2023-07-06 ENCOUNTER — TELEPHONE (OUTPATIENT)
Dept: OBGYN CLINIC | Facility: CLINIC | Age: 48
End: 2023-07-06

## 2023-07-06 NOTE — TELEPHONE ENCOUNTER
Talked to patient she is aware per Dr. Breaux Roch her HbA1c is to high and we have to post pone her surgery. Patient is seeing her PCP re test results and will keep us updated.

## 2023-07-10 ENCOUNTER — RA CDI HCC (OUTPATIENT)
Dept: OTHER | Facility: HOSPITAL | Age: 48
End: 2023-07-10

## 2023-07-10 NOTE — PROGRESS NOTES
[address] [facility] coding opportunities          Chart Reviewed number of suggestions sent to Provider: 2   E11.65  E66.01    Patients Insurance        Commercial Insurance: [employer]

## 2023-07-13 ENCOUNTER — OFFICE VISIT (OUTPATIENT)
Dept: INTERNAL MEDICINE CLINIC | Facility: CLINIC | Age: 48
End: 2023-07-13
Payer: COMMERCIAL

## 2023-07-13 VITALS
RESPIRATION RATE: 16 BRPM | HEART RATE: 93 BPM | SYSTOLIC BLOOD PRESSURE: 150 MMHG | BODY MASS INDEX: 43.68 KG/M2 | WEIGHT: 271.8 LBS | DIASTOLIC BLOOD PRESSURE: 100 MMHG | HEIGHT: 66 IN | OXYGEN SATURATION: 99 %

## 2023-07-13 DIAGNOSIS — E11.65 TYPE 2 DIABETES MELLITUS WITH HYPERGLYCEMIA, UNSPECIFIED WHETHER LONG TERM INSULIN USE (HCC): Primary | ICD-10-CM

## 2023-07-13 PROCEDURE — 99214 OFFICE O/P EST MOD 30 MIN: CPT | Performed by: INTERNAL MEDICINE

## 2023-07-13 RX ORDER — INSULIN GLARGINE 100 [IU]/ML
10 INJECTION, SOLUTION SUBCUTANEOUS
Qty: 3 ML | Refills: 3 | Status: SHIPPED | OUTPATIENT
Start: 2023-07-13 | End: 2023-07-14

## 2023-07-13 NOTE — PROGRESS NOTES
Assessment/Plan:     Was supposed to be here for preop but A1c is too high for surgery, supposed to be having hysterectomy, has agreed to start metformin 500 twice a day and if tolerable, can increase it to thousand twice a day. Also 10 units of Lantus at bedtime, follow-up with me in 1 month, schedule surgery at least 6 weeks out. Quality Measures:     BMI Counseling: Body mass index is 43.87 kg/m². The BMI is above normal. Nutrition recommendations include decreasing portion sizes and encouraging healthy choices of fruits and vegetables. Exercise recommendations include moderate physical activity 150 minutes/week. Rationale for BMI follow-up plan is due to patient being overweight or obese. Depression Screening and Follow-up Plan: Patient was screened for depression during today's encounter. They screened negative with a PHQ-2 score of 0. Return in about 4 weeks (around 8/10/2023) for ? pre-op . No problem-specific Assessment & Plan notes found for this encounter. Diagnoses and all orders for this visit:    Type 2 diabetes mellitus with hyperglycemia, unspecified whether long term insulin use (HCC)  -     insulin glargine (Lantus) 100 units/mL subcutaneous injection; Inject 10 Units under the skin daily at bedtime    Other orders  -     metFORMIN (GLUCOPHAGE) 500 mg tablet; Take 500 mg by mouth 2 (two) times a day with meals          Subjective:      Patient ID: Mendel Curling is a 53 y.o. female. For preop. ALLERGIES:  Allergies   Allergen Reactions   • Iodinated Contrast Media Anaphylaxis     SOB, chest tightness, required epinephrine   • Aspirin Rash     As child        CURRENT MEDICATIONS:    Current Outpatient Medications:   •  Blood Glucose Monitoring Suppl ([employer] Blood Glucose Meter) w/Device KIT, Test blood sugar three times daily. , Disp: 1 kit, Rfl: 2  •  glucose blood test strip, Test blood sugar three times daily. , Disp: 200 strip, Rfl: 5  •  insulin glargine (Lantus) 100 units/mL subcutaneous injection, Inject 10 Units under the skin daily at bedtime, Disp: 3 mL, Rfl: 3  •  Lancets (LifeScan Unistik 2) MISC, Test blood sugar three times daily. , Disp: 200 each, Rfl: 5  •  levothyroxine 100 mcg tablet, Take one tab po every other day (alt with 112 mcg), Disp: 90 tablet, Rfl: 0  •  levothyroxine 112 mcg tablet, Take one tab po every other day (alt with 110 mcg), Disp: 90 tablet, Rfl: 0  •  metFORMIN (GLUCOPHAGE) 500 mg tablet, Take 500 mg by mouth 2 (two) times a day with meals, Disp: , Rfl:     ACTIVE PROBLEM LIST:  Patient Active Problem List   Diagnosis   • Asthma   • Chondromalacia patellae   • Obesity, morbid (HCC)   • Seborrheic keratosis   • Hypertriglyceridemia   • Type 2 diabetes mellitus without complication, without long-term current use of insulin (HCC)   • Hashimoto's thyroiditis   • Menorrhagia with regular cycle   • Vitamin D deficiency   • Iron deficiency anemia due to chronic blood loss   • Family history of lupus erythematosus   • Family history of rheumatoid arthritis   • Arthralgia of both hands   •  abnormal thyroid biopsy Atypia of undetermined significance ([place] [facility]    • Type 2 diabetes mellitus with hyperglycemia, unspecified whether long term insulin use (HCC)       PAST MEDICAL HISTORY:  Past Medical History:   Diagnosis Date   • Anemia    • Anesthesia     Cardiac event   • Asthma    • Bell's palsy    • COVID-19 10/20/2020   • Diabetes mellitus (HCC)     borderline-diet controlled   • Diabetes mellitus during pregnancy    • Disease of thyroid gland    • Hashimoto's disease    • Migraine        PAST SURGICAL HISTORY:  Past Surgical History:   Procedure Laterality Date   •  SECTION     • HYSTEROSCOPY      with resection of intrauterine septum   • OR HYSTEROSCOPY BX ENDOMETRIUM&/POLYPC W/WO D&C N/A 2023    Procedure: DILATATION AND CURETTAGE (D&C) WITH HYSTEROSCOPY;  Surgeon: Eve Beard MD;  Location: [facility];  Service: Gynecology   • US GUIDED THYROID BIOPSY  6/13/2019   • US GUIDED THYROID BIOPSY  9/19/2019       FAMILY HISTORY:  Family History   Problem Relation Age of Onset   • Migraines Mother    • Heart disease Father         cardiac disorder   • Hyperlipidemia Father    • Hypertension Father    • Breast cancer Maternal Grandmother    • Cervical cancer Maternal Grandmother    • Ovarian cancer Paternal Grandmother    • Cancer Paternal Grandmother    • Cervical cancer Maternal Aunt    • Endometrial cancer Maternal Aunt 20   • Breast cancer Paternal Aunt    • Endometrial cancer Paternal Aunt 34   • Uterine cancer Paternal Aunt    • Ovarian cancer Paternal Aunt    • Colon cancer Neg Hx        SOCIAL HISTORY:  Social History     Socioeconomic History   • Marital status: /Civil Union     Spouse name: Not on file   • Number of children: 2   • Years of education: Not on file   • Highest education level: Not on file   Occupational History   • Occupation: employed     Comment: full-time   Tobacco Use   • Smoking status: Never   • Smokeless tobacco: Never   Vaping Use   • Vaping Use: Never used   Substance and Sexual Activity   • Alcohol use: Yes     Comment: monthly   • Drug use: Never   • Sexual activity: Yes     Partners: Male     Birth control/protection: Male Sterilization   Other Topics Concern   • Not on file   Social History Narrative    Always uses a seatbelt    Caffeine use    Feels safe at home    Lives with spouse    Recreational activities: walking     Social Determinants of Health     Financial Resource Strain: Not on file   Food Insecurity: Not on file   Transportation Needs: Not on file   Physical Activity: Not on file   Stress: Not on file   Social Connections: Not on file   Intimate Partner Violence: Not on file   Housing Stability: Not on file       Review of Systems   Constitutional: Negative for chills and fever. HENT: Negative for ear pain and sore throat. Eyes: Negative for pain and visual disturbance. Respiratory: Negative for cough and shortness of breath. Cardiovascular: Negative for chest pain and palpitations. Gastrointestinal: Negative for abdominal pain and vomiting. Genitourinary: Positive for menstrual problem. Negative for dysuria and hematuria. Musculoskeletal: Negative for arthralgias and back pain. Skin: Negative for color change and rash. Neurological: Negative for seizures and syncope. All other systems reviewed and are negative. Objective:  Vitals:    07/13/23 1543   BP: 150/100   BP Location: Left arm   Patient Position: Sitting   Cuff Size: Adult   Pulse: 93   Resp: 16   SpO2: 99%   Weight: 123 kg (271 lb 12.8 oz)   Height: 5' 6" (1.676 m)     Body mass index is 43.87 kg/m². Physical Exam  Vitals and nursing note reviewed. Constitutional:       Appearance: Normal appearance. She is obese. HENT:      Head: Normocephalic and atraumatic. Cardiovascular:      Rate and Rhythm: Normal rate. Pulmonary:      Effort: Pulmonary effort is normal.   Musculoskeletal:         General: Normal range of motion. Cervical back: Normal range of motion. Right lower leg: No edema. Left lower leg: No edema. Skin:     General: Skin is warm and dry. Neurological:      General: No focal deficit present. Mental Status: She is alert and oriented to person, place, and time. Mental status is at baseline. Psychiatric:         Mood and Affect: Mood normal.           RESULTS:    In chart    This note was created with voice recognition software. Phonic, grammatical and spelling errors may be present within the note as a result.

## 2023-07-14 ENCOUNTER — TELEPHONE (OUTPATIENT)
Dept: OBGYN CLINIC | Facility: CLINIC | Age: 48
End: 2023-07-14

## 2023-07-14 DIAGNOSIS — E11.65 TYPE 2 DIABETES MELLITUS WITH HYPERGLYCEMIA, UNSPECIFIED WHETHER LONG TERM INSULIN USE (HCC): Primary | ICD-10-CM

## 2023-07-14 RX ORDER — INSULIN GLARGINE 100 [IU]/ML
10 INJECTION, SOLUTION SUBCUTANEOUS
Qty: 3 ML | Refills: 2 | Status: SHIPPED | OUTPATIENT
Start: 2023-07-14 | End: 2023-10-12

## 2023-07-14 NOTE — TELEPHONE ENCOUNTER
Patient called in to ask about rescheduling hysterectomy with [facility]. Had to cancel 7/18 due to test results she had gotten back. Please advise.

## 2023-07-17 DIAGNOSIS — E11.9 TYPE 2 DIABETES MELLITUS WITHOUT COMPLICATION, WITHOUT LONG-TERM CURRENT USE OF INSULIN (HCC): Primary | ICD-10-CM

## 2023-07-17 RX ORDER — PEN NEEDLE, DIABETIC 32 GX3/16"
NEEDLE, DISPOSABLE MISCELLANEOUS
Qty: 100 EACH | Refills: 5 | Status: SHIPPED | OUTPATIENT
Start: 2023-07-17

## 2023-07-17 NOTE — TELEPHONE ENCOUNTER
Talked to patient she is going to back up her appt with her PCP till the 6 week eloisa then have her Hba1c drawn. Patient will call me back when this is drawn so I can keep a look out for the results.

## 2023-07-27 ENCOUNTER — TELEPHONE (OUTPATIENT)
Dept: DIABETES SERVICES | Facility: CLINIC | Age: 48
End: 2023-07-27

## 2023-08-23 ENCOUNTER — OFFICE VISIT (OUTPATIENT)
Dept: INTERNAL MEDICINE CLINIC | Facility: CLINIC | Age: 48
End: 2023-08-23
Payer: COMMERCIAL

## 2023-08-23 ENCOUNTER — VBI (OUTPATIENT)
Dept: ADMINISTRATIVE | Facility: OTHER | Age: 48
End: 2023-08-23

## 2023-08-23 VITALS
HEIGHT: 66 IN | OXYGEN SATURATION: 98 % | DIASTOLIC BLOOD PRESSURE: 80 MMHG | BODY MASS INDEX: 42.43 KG/M2 | RESPIRATION RATE: 14 BRPM | WEIGHT: 264 LBS | SYSTOLIC BLOOD PRESSURE: 134 MMHG | HEART RATE: 111 BPM

## 2023-08-23 DIAGNOSIS — N92.0 MENORRHAGIA WITH REGULAR CYCLE: ICD-10-CM

## 2023-08-23 DIAGNOSIS — Z01.818 PREOP EXAM FOR INTERNAL MEDICINE: Primary | ICD-10-CM

## 2023-08-23 DIAGNOSIS — E11.9 TYPE 2 DIABETES MELLITUS WITHOUT COMPLICATION, WITHOUT LONG-TERM CURRENT USE OF INSULIN (HCC): ICD-10-CM

## 2023-08-23 DIAGNOSIS — Z80.41 FAMILY HISTORY OF OVARIAN CANCER: ICD-10-CM

## 2023-08-23 LAB — SL AMB POCT HEMOGLOBIN AIC: 7.5 (ref ?–6.5)

## 2023-08-23 PROCEDURE — 83036 HEMOGLOBIN GLYCOSYLATED A1C: CPT | Performed by: INTERNAL MEDICINE

## 2023-08-23 PROCEDURE — 99214 OFFICE O/P EST MOD 30 MIN: CPT | Performed by: INTERNAL MEDICINE

## 2023-08-23 PROCEDURE — 92250 FUNDUS PHOTOGRAPHY W/I&R: CPT | Performed by: INTERNAL MEDICINE

## 2023-08-23 NOTE — PROGRESS NOTES
Patient's shoes and socks removed. Right Foot/Ankle   Right Foot Inspection  Skin Exam: skin normal and skin intact. No dry skin, no warmth, no callus, no erythema, no maceration, no abnormal color, no pre-ulcer, no ulcer and no callus. Toe Exam: ROM and strength within normal limits. Sensory   Monofilament testing: intact    Vascular  The right DP pulse is 2+. Right Toe  - Comprehensive Exam  Ecchymosis: none  Swelling: none         Left Foot/Ankle  Left Foot Inspection  Skin Exam: skin normal and skin intact. No dry skin, no warmth, no erythema, no maceration, normal color, no pre-ulcer, no ulcer and no callus. Toe Exam: ROM and strength within normal limits. Sensory   Monofilament testing: intact    Vascular  The left DP pulse is 2+.      Left Toe  - Comprehensive Exam  Ecchymosis: none  Swelling: none             Assign Risk Category  No deformity present  No loss of protective sensation  No weak pulses  Risk: 0

## 2023-08-23 NOTE — PROGRESS NOTES
Assessment/Plan:     Elijah Feliz is here for pre-op, a1c came down nicely to 7. 5! She is medically optimized (cleared) for her upcoming surgery; she also lost weight; For her DM, continue metformin and lantus @ HS; come back in 2 months for f/u. Quality Measures:     Depression Screening and Follow-up Plan: Patient was screened for depression during today's encounter. They screened negative with a PHQ-2 score of 0. Return in about 2 months (around 10/23/2023). No problem-specific Assessment & Plan notes found for this encounter. Diagnoses and all orders for this visit:    Preop exam for internal medicine    Type 2 diabetes mellitus without complication, without long-term current use of insulin (HCC)  -     IRIS Diabetic eye exam  -     POCT hemoglobin A1c    Menorrhagia with regular cycle    Family history of ovarian cancer    Other orders  -     metFORMIN (GLUCOPHAGE) 1000 MG tablet; Take 1 tablet (1,000 mg total) by mouth 2 (two) times a day with meals          Subjective:      Patient ID: Re Weldon is a 53 y.o. female. Here for pre-op exam;      ALLERGIES:  Allergies   Allergen Reactions   • Iodinated Contrast Media Anaphylaxis     SOB, chest tightness, required epinephrine       CURRENT MEDICATIONS:    Current Outpatient Medications:   •  Blood Glucose Monitoring Suppl ([employer] Blood Glucose Meter) w/Device KIT, Test blood sugar three times daily. , Disp: 1 kit, Rfl: 2  •  glucose blood test strip, Test blood sugar three times daily. , Disp: 200 strip, Rfl: 5  •  Insulin Glargine Solostar (Lantus SoloStar) 100 UNIT/ML SOPN, Inject 0.1 mL (10 Units total) under the skin daily at bedtime, Disp: 3 mL, Rfl: 2  •  Insulin Pen Needle (CareFine Pen Needles) 32G X 5 MM MISC, For use with insulin injections daily. , Disp: 100 each, Rfl: 5  •  Lancets (LifeScan Unistik 2) MISC, Test blood sugar three times daily. , Disp: 200 each, Rfl: 5  •  levothyroxine 100 mcg tablet, Take one tab po every other day (alt with 112 mcg), Disp: 90 tablet, Rfl: 0  •  levothyroxine 112 mcg tablet, Take one tab po every other day (alt with 110 mcg), Disp: 90 tablet, Rfl: 0  •  metFORMIN (GLUCOPHAGE) 1000 MG tablet, Take 1 tablet (1,000 mg total) by mouth 2 (two) times a day with meals, Disp: 180 tablet, Rfl: 3    ACTIVE PROBLEM LIST:  Patient Active Problem List   Diagnosis   • Asthma   • Chondromalacia patellae   • Obesity, morbid (HCC)   • Seborrheic keratosis   • Hypertriglyceridemia   • Type 2 diabetes mellitus without complication, without long-term current use of insulin ([facility])   • Hashimoto's thyroiditis   • Menorrhagia with regular cycle   • Vitamin D deficiency   • Iron deficiency anemia due to chronic blood loss   • Family history of lupus erythematosus   • Family history of rheumatoid arthritis   • Arthralgia of both hands   •  abnormal thyroid biopsy Atypia of undetermined significance ([place] Category    • Type 2 diabetes mellitus with hyperglycemia, unspecified whether long term insulin use ([facility])       PAST MEDICAL HISTORY:  Past Medical History:   Diagnosis Date   • Anemia    • Anesthesia     Cardiac event   • Asthma    • Bell's palsy    • COVID-19 10/20/2020   • Diabetes mellitus ([address])     borderline-diet controlled   • Diabetes mellitus during pregnancy    • Disease of thyroid gland    • Hashimoto's disease    • Migraine        PAST SURGICAL HISTORY:  Past Surgical History:   Procedure Laterality Date   •  SECTION     • HYSTEROSCOPY      with resection of intrauterine septum   • HI HYSTEROSCOPY BX ENDOMETRIUM&/POLYPC W/WO D&C N/A 2023    Procedure: DILATATION AND CURETTAGE (D&C) WITH HYSTEROSCOPY;  Surgeon: Win Olivas MD;  Location: [facility];  Service: Gynecology   • US GUIDED THYROID BIOPSY  2019   • US GUIDED THYROID BIOPSY  2019       FAMILY HISTORY:  Family History   Problem Relation Age of Onset   • Migraines Mother    • Heart disease Father         cardiac disorder • Hyperlipidemia Father    • Hypertension Father    • Breast cancer Maternal Grandmother    • Cervical cancer Maternal Grandmother    • Ovarian cancer Paternal Grandmother    • Cancer Paternal Grandmother    • Cervical cancer Maternal Aunt    • Endometrial cancer Maternal Aunt 20   • Breast cancer Paternal Aunt    • Endometrial cancer Paternal Aunt 34   • Uterine cancer Paternal Aunt    • Ovarian cancer Paternal Aunt    • Colon cancer Neg Hx        SOCIAL HISTORY:  Social History     Socioeconomic History   • Marital status: /Civil Union     Spouse name: Not on file   • Number of children: 2   • Years of education: Not on file   • Highest education level: Not on file   Occupational History   • Occupation: employed     Comment: full-time   Tobacco Use   • Smoking status: Never   • Smokeless tobacco: Never   Vaping Use   • Vaping Use: Never used   Substance and Sexual Activity   • Alcohol use: Yes     Comment: monthly   • Drug use: Never   • Sexual activity: Yes     Partners: Male     Birth control/protection: Male Sterilization   Other Topics Concern   • Not on file   Social History Narrative    Always uses a seatbelt    Caffeine use    Feels safe at home    Lives with spouse    Recreational activities: walking     Social Determinants of Health     Financial Resource Strain: Not on file   Food Insecurity: Not on file   Transportation Needs: Not on file   Physical Activity: Not on file   Stress: Not on file   Social Connections: Not on file   Intimate Partner Violence: Not on file   Housing Stability: Not on file       Review of Systems   Constitutional: Negative for chills and fever. HENT: Negative for ear pain and sore throat. Eyes: Negative for pain and visual disturbance. Respiratory: Negative for cough and shortness of breath. Cardiovascular: Negative for chest pain and palpitations. Gastrointestinal: Negative for abdominal pain and vomiting. Genitourinary: Positive for menstrual problem. Negative for dysuria and hematuria. Musculoskeletal: Negative for arthralgias and back pain. Skin: Negative for color change and rash. Neurological: Negative for seizures and syncope. All other systems reviewed and are negative. Objective:  Vitals:    08/23/23 1418   BP: 134/80   BP Location: Left arm   Patient Position: Sitting   Pulse: (!) 111   Resp: 14   SpO2: 98%   Weight: 120 kg (264 lb)   Height: 5' 6" (1.676 m)     Body mass index is 42.61 kg/m². Physical Exam  Vitals and nursing note reviewed. Constitutional:       Appearance: Normal appearance. She is obese. HENT:      Head: Normocephalic and atraumatic. Cardiovascular:      Rate and Rhythm: Normal rate and regular rhythm. Heart sounds: Normal heart sounds. Pulmonary:      Effort: Pulmonary effort is normal.      Breath sounds: Normal breath sounds. Musculoskeletal:         General: Normal range of motion. Cervical back: Normal range of motion. Right lower leg: No edema. Left lower leg: No edema. Skin:     General: Skin is warm and dry. Neurological:      General: No focal deficit present. Mental Status: She is alert and oriented to person, place, and time. Mental status is at baseline. Psychiatric:         Mood and Affect: Mood normal.           RESULTS:  Hemoglobin A1C   Date/Time Value Ref Range Status   08/23/2023 02:38 PM 7.5 (A) 6.5 Final   06/28/2023 07:14 AM 8.2 (H) Normal 3.8-5.6%; PreDiabetic 5.7-6.4%; Diabetic >=6.5%; Glycemic control for adults with diabetes <7.0% % Final   09/14/2017 04:20 PM 6.1  Final     Comment:       Performed at:  In Office  ,       Cholesterol   Date/Time Value Ref Range Status   10/21/2022 10:11  (H) See Comment mg/dL Final     Comment:     Cholesterol:         Pediatric <18 Years        Desirable          <170 mg/dL      Borderline High    170-199 mg/dL      High               >=200 mg/dL        Adult >=18 Years            Desirable        <200 mg/dL Borderline High  200-239 mg/dL      High             >239 mg/dL       Triglycerides   Date/Time Value Ref Range Status   10/21/2022 10:11  See Comment mg/dL Final     Comment:     Triglyceride:     0-9Y            <75mg/dL     10Y-17Y         <90 mg/dL       >=18Y     Normal          <150 mg/dL     Borderline High 150-199 mg/dL     High            200-499 mg/dL        Very High       >499 mg/dL    Specimen collection should occur prior to N-Acetylcysteine or Metamizole administration due to the potential for falsely depressed results. HDL, Direct   Date/Time Value Ref Range Status   10/21/2022 10:11 AM 59 >=50 mg/dL Final     Comment:     Specimen collection should occur prior to Metamizole administration due to the potential for falsley depressed results. LDL Calculated   Date/Time Value Ref Range Status   10/21/2022 10:11  (H) 0 - 100 mg/dL Final     Comment:     LDL Cholesterol:     Optimal           <100 mg/dl     Near Optimal      100-129 mg/dl     Above Optimal       Borderline High 130-159 mg/dl       High            160-189 mg/dl       Very High       >189 mg/dl         This screening LDL is a calculated result. It does not have the accuracy of the Direct Measured LDL in the monitoring of patients with hyperlipidemia and/or statin therapy. Direct Measure LDL (PLJ100) must be ordered separately in these patients.      Hemoglobin   Date/Time Value Ref Range Status   06/28/2023 07:14 AM 10.6 (L) 11.5 - 15.4 g/dL Final     Hematocrit   Date/Time Value Ref Range Status   06/28/2023 07:14 AM 34.9 34.8 - 46.1 % Final     Platelets   Date/Time Value Ref Range Status   06/28/2023 07:14  149 - 390 Thousands/uL Final     TSH 3RD GENERATON   Date/Time Value Ref Range Status   10/21/2022 10:11 AM 3.688 0.450 - 4.500 uIU/mL Final     Comment:     The recommended reference ranges for TSH during pregnancy are as follows:   First trimester 0.1 to 2.5 uIU/mL   Second trimester  0.2 to 3.0 uIU/mL Third trimester 0.3 to 3.0 uIU/m    Note: Normal ranges may not apply to patients who are transgender, non-binary, or whose legal sex, sex at birth, and gender identity differ. Adult TSH (3rd generation) reference range follows the recommended guidelines of the American Thyroid Association, January, 2020. Free T4   Date/Time Value Ref Range Status   11/18/2019 09:01 AM 1.14 0.76 - 1.46 ng/dL Final     Comment:       Specimen collection should occur prior to Sulfasalazine administration due to the potential for falsely elevated results. Sodium   Date/Time Value Ref Range Status   06/28/2023 07:14  135 - 147 mmol/L Final     BUN   Date/Time Value Ref Range Status   06/28/2023 07:14 AM 14 5 - 25 mg/dL Final     Creatinine   Date/Time Value Ref Range Status   06/28/2023 07:14 AM 0.68 0.60 - 1.30 mg/dL Final     Comment:     Standardized to IDMS reference method      In chart    This note was created with voice recognition software. Phonic, grammatical and spelling errors may be present within the note as a result.

## 2023-08-25 LAB
LEFT EYE DIABETIC RETINOPATHY: ABNORMAL
LEFT EYE IMAGE QUALITY: ABNORMAL
LEFT EYE MACULAR EDEMA: ABNORMAL
LEFT EYE OTHER RETINOPATHY: ABNORMAL
RIGHT EYE DIABETIC RETINOPATHY: ABNORMAL
RIGHT EYE IMAGE QUALITY: ABNORMAL
RIGHT EYE MACULAR EDEMA: ABNORMAL
RIGHT EYE OTHER RETINOPATHY: ABNORMAL
SEVERITY (EYE EXAM): ABNORMAL

## 2023-08-31 ENCOUNTER — TELEPHONE (OUTPATIENT)
Age: 48
End: 2023-08-31

## 2023-08-31 NOTE — TELEPHONE ENCOUNTER
Spoke with patient regarding completed [facility] paperwork for upcoming surgery. She will pick it up in [place] today.

## 2023-08-31 NOTE — TELEPHONE ENCOUNTER
----- Message from [address] [facility] sent at 8/29/2023 12:47 PM EDT -----  Regarding: FMLA NOT COMPLETED  Please complete FMLA paperwork and notify patient when done.

## 2023-09-07 DIAGNOSIS — E11.9 TYPE 2 DIABETES MELLITUS WITHOUT COMPLICATION, WITHOUT LONG-TERM CURRENT USE OF INSULIN (HCC): Primary | ICD-10-CM

## 2023-09-11 ENCOUNTER — OFFICE VISIT (OUTPATIENT)
Dept: LAB | Facility: HOSPITAL | Age: 48
End: 2023-09-11
Attending: OBSTETRICS & GYNECOLOGY
Payer: COMMERCIAL

## 2023-09-11 ENCOUNTER — APPOINTMENT (OUTPATIENT)
Age: 48
End: 2023-09-11
Payer: COMMERCIAL

## 2023-09-11 DIAGNOSIS — Z01.818 PRE-OP TESTING: ICD-10-CM

## 2023-09-11 DIAGNOSIS — Z01.818 PRE-OP EVALUATION: ICD-10-CM

## 2023-09-11 LAB
ABO GROUP BLD: NORMAL
BLD GP AB SCN SERPL QL: NEGATIVE
RH BLD: POSITIVE
SPECIMEN EXPIRATION DATE: NORMAL

## 2023-09-11 PROCEDURE — 36415 COLL VENOUS BLD VENIPUNCTURE: CPT

## 2023-09-11 PROCEDURE — 86900 BLOOD TYPING SEROLOGIC ABO: CPT

## 2023-09-11 PROCEDURE — 86901 BLOOD TYPING SEROLOGIC RH(D): CPT

## 2023-09-11 PROCEDURE — 93005 ELECTROCARDIOGRAM TRACING: CPT

## 2023-09-11 PROCEDURE — 86850 RBC ANTIBODY SCREEN: CPT

## 2023-09-12 LAB
ATRIAL RATE: 92 BPM
P AXIS: 46 DEGREES
PR INTERVAL: 164 MS
QRS AXIS: 2 DEGREES
QRSD INTERVAL: 88 MS
QT INTERVAL: 362 MS
QTC INTERVAL: 447 MS
T WAVE AXIS: 6 DEGREES
VENTRICULAR RATE: 92 BPM

## 2023-09-12 PROCEDURE — 93010 ELECTROCARDIOGRAM REPORT: CPT | Performed by: INTERNAL MEDICINE

## 2023-09-12 NOTE — PRE-PROCEDURE INSTRUCTIONS
Pre-Surgery Instructions:   Medication Instructions   • Insulin Glargine Solostar (Lantus SoloStar) 100 UNIT/ML SOPN Take full dose the evening before procedure   • levothyroxine 100 mcg tablet Take day of surgery. • levothyroxine 112 mcg tablet Take day of surgery. • metFORMIN (GLUCOPHAGE) 1000 MG tablet Hold day of surgery. Medication instructions for day surgery reviewed. Please use only a sip of water to take your instructed medications. Avoid all over the counter vitamins, supplements and NSAIDS for one week prior to surgery per anesthesia guidelines. Tylenol is ok to take as needed. You will receive a call one business day prior to surgery with an arrival time and hospital directions. If your surgery is scheduled on a Monday, the hospital will be calling you on the Friday prior to your surgery. If you have not heard from anyone by 8pm, please call the hospital supervisor through the hospital  at [phone]. Nabor Morillomejia [phone]). Do not eat or drink anything after midnight the night before your surgery, including candy, mints, lifesavers, or chewing gum. Do not drink alcohol 24hrs before your surgery. Try not to smoke at least 24hrs before your surgery. Follow the pre surgery showering instructions as listed in the [facility] Surgical Experience Booklet” or otherwise provided by your surgeon's office. Do not shave the surgical area 24 hours before surgery. Do not apply any lotions, creams, including makeup, cologne, deodorant, or perfumes after showering on the day of your surgery. No contact lenses, eye make-up, or artificial eyelashes. Remove nail polish, including gel polish, and any artificial, gel, or acrylic nails if possible. Remove all jewelry including rings and body piercing jewelry. Wear causal clothing that is easy to take on and off. Consider your type of surgery. Keep any valuables, jewelry, piercings at home.  Please bring any specially ordered equipment (sling, braces) if indicated. Arrange for a responsible person to drive you to and from the hospital on the day of your surgery. Visitor Guidelines discussed. Call the surgeon's office with any new illnesses, exposures, or additional questions prior to surgery. Please reference your [facility] Surgical Experience Booklet” for additional information to prepare for your upcoming surgery.

## 2023-09-21 ENCOUNTER — ANESTHESIA EVENT (OUTPATIENT)
Dept: PERIOP | Facility: HOSPITAL | Age: 48
End: 2023-09-21
Payer: COMMERCIAL

## 2023-09-21 ENCOUNTER — VBI (OUTPATIENT)
Dept: ADMINISTRATIVE | Facility: OTHER | Age: 48
End: 2023-09-21

## 2023-09-21 NOTE — ANESTHESIA PREPROCEDURE EVALUATION
Procedure:  HYSTERECTOMY LAPAROSCOPIC TOTAL WITH BILATERAL SALPINGO-OOPHERECTOMY (Bilateral: Abdomen)    Relevant Problems   ANESTHESIA (within normal limits)  reports "cardiac event" occured during emergent intubation for C/S. Possible arrest vs severe julien      CARDIO   (+) Hypertriglyceridemia      ENDO   (+) Type 2 diabetes mellitus without complication, without long-term current use of insulin (HCC)      GI/HEPATIC (within normal limits)  NPO confirmed  BMI 41.2      /RENAL (within normal limits)      HEMATOLOGY   (+) Iron deficiency anemia due to chronic blood loss      MUSCULOSKELETAL   (+) Chondromalacia patellae      PULMONARY   (+) Asthma   (-) URI (upper respiratory infection)      Endocrine   (+) Hashimoto's thyroiditis   -T+S valid to 10/12  -levothyrox yest, lantus 10u last night, metformin yest    PCP 8/23/23  Assessment/Plan:      Varsha Nguyen is here for pre-op, a1c came down nicely to 7. 5! She is medically optimized (cleared) for her upcoming surgery; she also lost weight;      For her DM, continue metformin and lantus @ HS; come back in 2 months for f/u. Allergies   Allergen Reactions   • Iodinated Contrast Media Anaphylaxis     SOB, chest tightness, required epinephrine     Social History     Tobacco Use   • Smoking status: Never   • Smokeless tobacco: Never   Vaping Use   • Vaping Use: Never used   Substance Use Topics   • Alcohol use: Yes     Comment: socially   • Drug use: Never     Current Outpatient Medications   Medication Instructions   • Blood Glucose Monitoring Suppl ([employer] Blood Glucose Meter) w/Device KIT Test blood sugar three times daily. • glucose blood test strip Test blood sugar three times daily. • Insulin Glargine Solostar (LANTUS SOLOSTAR) 10 Units, Subcutaneous, Daily at bedtime   • Insulin Pen Needle (CareFine Pen Needles) 32G X 5 MM MISC For use with insulin injections daily. • Lancets (LifeScan Unistik 2) MISC Test blood sugar three times daily.    • levothyroxine 100 mcg tablet Take one tab po every other day (alt with 112 mcg)   • levothyroxine 112 mcg tablet Take one tab po every other day (alt with 110 mcg)   • metFORMIN (GLUCOPHAGE) 1,000 mg, Oral, 2 times daily with meals     Lab Results   Component Value Date    WBC 8.29 06/28/2023    HGB 10.6 (L) 06/28/2023    HCT 34.9 06/28/2023     06/28/2023    SODIUM 135 06/28/2023    K 4.5 06/28/2023     06/28/2023    CO2 29 06/28/2023    BUN 14 06/28/2023    CREATININE 0.68 06/28/2023    GLUC 159 (H) 11/22/2021    HGBA1C 7.8 (H) 09/11/2023    AST 20 10/21/2022    ALT 30 10/21/2022    ALKPHOS 110 10/21/2022    TBILI 0.71 10/21/2022    ALB 3.4 (L) 10/21/2022     Vitals:    09/22/23 1114   BP: 163/71   Pulse: 101   Resp: 18   Temp: (!) 97.2 °F (36.2 °C)   SpO2: 99%     EKG 9/11/23 - NSR    Physical Exam    Airway    Mallampati score: III  TM Distance: >3 FB  Neck ROM: full     Dental   Comment: Denies loose/chipped teeth, No notable dental hx     Cardiovascular  Rhythm: regular, Rate: normal,     Pulmonary  Breath sounds clear to auscultation,     Other Findings        Anesthesia Plan  ASA Score- 3     Anesthesia Type- general with ASA Monitors. Additional Monitors:   Airway Plan: ETT. Plan Factors-Exercise tolerance (METS): >4 METS. Exercise comment: Able to climb two flights of stairs without cardiopulmonary limitation. Chart reviewed. EKG reviewed. Existing labs reviewed. Patient summary reviewed. Patient is not a current smoker. Patient did not smoke on day of surgery. Induction- intravenous. Postoperative Plan- Plan for postoperative opioid use. Planned trial extubation    Informed Consent- Anesthetic plan and risks discussed with patient. I personally reviewed this patient with the CRNA. Discussed and agreed on the Anesthesia Plan with the CRNA. Kenrick Cheung

## 2023-09-22 ENCOUNTER — ANESTHESIA (OUTPATIENT)
Dept: PERIOP | Facility: HOSPITAL | Age: 48
End: 2023-09-22
Payer: COMMERCIAL

## 2023-09-22 ENCOUNTER — HOSPITAL ENCOUNTER (OUTPATIENT)
Facility: HOSPITAL | Age: 48
Setting detail: OUTPATIENT SURGERY
Discharge: HOME | End: 2023-09-22
Attending: OBSTETRICS & GYNECOLOGY | Admitting: OBSTETRICS & GYNECOLOGY
Payer: COMMERCIAL

## 2023-09-22 VITALS
HEART RATE: 92 BPM | RESPIRATION RATE: 20 BRPM | OXYGEN SATURATION: 97 % | WEIGHT: 255.51 LBS | BODY MASS INDEX: 41.06 KG/M2 | DIASTOLIC BLOOD PRESSURE: 66 MMHG | HEIGHT: 66 IN | SYSTOLIC BLOOD PRESSURE: 132 MMHG | TEMPERATURE: 100.6 F

## 2023-09-22 DIAGNOSIS — N92.0 MENORRHAGIA WITH REGULAR CYCLE: ICD-10-CM

## 2023-09-22 DIAGNOSIS — G89.18 POSTOPERATIVE PAIN: Primary | ICD-10-CM

## 2023-09-22 LAB
GLUCOSE SERPL-MCNC: 165 MG/DL (ref 65–140)
GLUCOSE SERPL-MCNC: 239 MG/DL (ref 65–140)

## 2023-09-22 PROCEDURE — 58571 TLH W/T/O 250 G OR LESS: CPT | Performed by: OBSTETRICS & GYNECOLOGY

## 2023-09-22 PROCEDURE — 82948 REAGENT STRIP/BLOOD GLUCOSE: CPT

## 2023-09-22 PROCEDURE — 88307 TISSUE EXAM BY PATHOLOGIST: CPT | Performed by: PATHOLOGY

## 2023-09-22 PROCEDURE — NC001 PR NO CHARGE: Performed by: OBSTETRICS & GYNECOLOGY

## 2023-09-22 RX ORDER — KETOROLAC TROMETHAMINE 30 MG/ML
INJECTION, SOLUTION INTRAMUSCULAR; INTRAVENOUS AS NEEDED
Status: DISCONTINUED | OUTPATIENT
Start: 2023-09-22 | End: 2023-09-22

## 2023-09-22 RX ORDER — CELECOXIB 100 MG/1
100 CAPSULE ORAL ONCE
Status: COMPLETED | OUTPATIENT
Start: 2023-09-22 | End: 2023-09-22

## 2023-09-22 RX ORDER — IBUPROFEN 600 MG/1
600 TABLET ORAL EVERY 6 HOURS PRN
Qty: 30 TABLET | Refills: 0 | Status: SHIPPED | OUTPATIENT
Start: 2023-09-22

## 2023-09-22 RX ORDER — IBUPROFEN 600 MG/1
600 TABLET ORAL EVERY 6 HOURS PRN
Status: DISCONTINUED | OUTPATIENT
Start: 2023-09-22 | End: 2023-09-22 | Stop reason: HOSPADM

## 2023-09-22 RX ORDER — ROCURONIUM BROMIDE 10 MG/ML
INJECTION, SOLUTION INTRAVENOUS AS NEEDED
Status: DISCONTINUED | OUTPATIENT
Start: 2023-09-22 | End: 2023-09-22

## 2023-09-22 RX ORDER — FENTANYL CITRATE 50 UG/ML
INJECTION, SOLUTION INTRAMUSCULAR; INTRAVENOUS AS NEEDED
Status: DISCONTINUED | OUTPATIENT
Start: 2023-09-22 | End: 2023-09-22

## 2023-09-22 RX ORDER — ACETAMINOPHEN 325 MG/1
975 TABLET ORAL EVERY 6 HOURS PRN
Status: DISCONTINUED | OUTPATIENT
Start: 2023-09-22 | End: 2023-09-22 | Stop reason: HOSPADM

## 2023-09-22 RX ORDER — MAGNESIUM HYDROXIDE 1200 MG/15ML
LIQUID ORAL AS NEEDED
Status: DISCONTINUED | OUTPATIENT
Start: 2023-09-22 | End: 2023-09-22 | Stop reason: HOSPADM

## 2023-09-22 RX ORDER — ACETAMINOPHEN 325 MG/1
975 TABLET ORAL ONCE
Status: COMPLETED | OUTPATIENT
Start: 2023-09-22 | End: 2023-09-22

## 2023-09-22 RX ORDER — DEXAMETHASONE SODIUM PHOSPHATE 10 MG/ML
INJECTION, SOLUTION INTRAMUSCULAR; INTRAVENOUS AS NEEDED
Status: DISCONTINUED | OUTPATIENT
Start: 2023-09-22 | End: 2023-09-22

## 2023-09-22 RX ORDER — HYDROMORPHONE HCL/PF 1 MG/ML
SYRINGE (ML) INJECTION AS NEEDED
Status: DISCONTINUED | OUTPATIENT
Start: 2023-09-22 | End: 2023-09-22

## 2023-09-22 RX ORDER — MIDAZOLAM HYDROCHLORIDE 2 MG/2ML
INJECTION, SOLUTION INTRAMUSCULAR; INTRAVENOUS AS NEEDED
Status: DISCONTINUED | OUTPATIENT
Start: 2023-09-22 | End: 2023-09-22

## 2023-09-22 RX ORDER — OXYCODONE HYDROCHLORIDE 5 MG/1
5 TABLET ORAL EVERY 4 HOURS PRN
Qty: 15 TABLET | Refills: 0 | Status: SHIPPED | OUTPATIENT
Start: 2023-09-22 | End: 2023-10-02

## 2023-09-22 RX ORDER — CEFAZOLIN SODIUM 2 G/50ML
2000 SOLUTION INTRAVENOUS ONCE
Status: COMPLETED | OUTPATIENT
Start: 2023-09-22 | End: 2023-09-22

## 2023-09-22 RX ORDER — SCOLOPAMINE TRANSDERMAL SYSTEM 1 MG/1
1 PATCH, EXTENDED RELEASE TRANSDERMAL
Status: DISCONTINUED | OUTPATIENT
Start: 2023-09-22 | End: 2023-09-22 | Stop reason: HOSPADM

## 2023-09-22 RX ORDER — ONDANSETRON 2 MG/ML
4 INJECTION INTRAMUSCULAR; INTRAVENOUS ONCE AS NEEDED
Status: DISCONTINUED | OUTPATIENT
Start: 2023-09-22 | End: 2023-09-22 | Stop reason: HOSPADM

## 2023-09-22 RX ORDER — ACETAMINOPHEN 10 MG/ML
INJECTION INTRAVENOUS AS NEEDED
Status: DISCONTINUED | OUTPATIENT
Start: 2023-09-22 | End: 2023-09-22

## 2023-09-22 RX ORDER — PHENYLEPHRINE HCL IN 0.9% NACL 1 MG/10 ML
SYRINGE (ML) INTRAVENOUS AS NEEDED
Status: DISCONTINUED | OUTPATIENT
Start: 2023-09-22 | End: 2023-09-22

## 2023-09-22 RX ORDER — PROPOFOL 10 MG/ML
INJECTION, EMULSION INTRAVENOUS AS NEEDED
Status: DISCONTINUED | OUTPATIENT
Start: 2023-09-22 | End: 2023-09-22

## 2023-09-22 RX ORDER — KETAMINE HCL IN NACL, ISO-OSM 100MG/10ML
SYRINGE (ML) INJECTION AS NEEDED
Status: DISCONTINUED | OUTPATIENT
Start: 2023-09-22 | End: 2023-09-22

## 2023-09-22 RX ORDER — FENTANYL CITRATE/PF 50 MCG/ML
50 SYRINGE (ML) INJECTION
Status: DISCONTINUED | OUTPATIENT
Start: 2023-09-22 | End: 2023-09-22 | Stop reason: HOSPADM

## 2023-09-22 RX ORDER — SODIUM CHLORIDE, SODIUM LACTATE, POTASSIUM CHLORIDE, CALCIUM CHLORIDE 600; 310; 30; 20 MG/100ML; MG/100ML; MG/100ML; MG/100ML
75 INJECTION, SOLUTION INTRAVENOUS CONTINUOUS
Status: DISCONTINUED | OUTPATIENT
Start: 2023-09-22 | End: 2023-09-22

## 2023-09-22 RX ORDER — LIDOCAINE HYDROCHLORIDE 20 MG/ML
INJECTION, SOLUTION EPIDURAL; INFILTRATION; INTRACAUDAL; PERINEURAL AS NEEDED
Status: DISCONTINUED | OUTPATIENT
Start: 2023-09-22 | End: 2023-09-22

## 2023-09-22 RX ORDER — ONDANSETRON 2 MG/ML
INJECTION INTRAMUSCULAR; INTRAVENOUS AS NEEDED
Status: DISCONTINUED | OUTPATIENT
Start: 2023-09-22 | End: 2023-09-22

## 2023-09-22 RX ORDER — OXYCODONE HYDROCHLORIDE 5 MG/1
5 TABLET ORAL EVERY 4 HOURS PRN
Status: DISCONTINUED | OUTPATIENT
Start: 2023-09-22 | End: 2023-09-22 | Stop reason: HOSPADM

## 2023-09-22 RX ORDER — LIDOCAINE HYDROCHLORIDE AND EPINEPHRINE 10; 10 MG/ML; UG/ML
INJECTION, SOLUTION INFILTRATION; PERINEURAL AS NEEDED
Status: DISCONTINUED | OUTPATIENT
Start: 2023-09-22 | End: 2023-09-22 | Stop reason: HOSPADM

## 2023-09-22 RX ORDER — GABAPENTIN 300 MG/1
300 CAPSULE ORAL ONCE
Status: COMPLETED | OUTPATIENT
Start: 2023-09-22 | End: 2023-09-22

## 2023-09-22 RX ORDER — PHENAZOPYRIDINE HYDROCHLORIDE 100 MG/1
100 TABLET, FILM COATED ORAL ONCE
Status: COMPLETED | OUTPATIENT
Start: 2023-09-22 | End: 2023-09-22

## 2023-09-22 RX ORDER — OXYCODONE HYDROCHLORIDE 5 MG/1
10 TABLET ORAL EVERY 4 HOURS PRN
Status: DISCONTINUED | OUTPATIENT
Start: 2023-09-22 | End: 2023-09-22 | Stop reason: HOSPADM

## 2023-09-22 RX ORDER — ACETAMINOPHEN 325 MG/1
650 TABLET ORAL EVERY 6 HOURS PRN
Refills: 0
Start: 2023-09-22

## 2023-09-22 RX ORDER — MEPERIDINE HYDROCHLORIDE 50 MG/ML
12.5 INJECTION INTRAMUSCULAR; INTRAVENOUS; SUBCUTANEOUS ONCE
Status: DISCONTINUED | OUTPATIENT
Start: 2023-09-22 | End: 2023-09-22 | Stop reason: HOSPADM

## 2023-09-22 RX ORDER — HYDROMORPHONE HCL/PF 1 MG/ML
0.5 SYRINGE (ML) INJECTION
Status: DISCONTINUED | OUTPATIENT
Start: 2023-09-22 | End: 2023-09-22 | Stop reason: HOSPADM

## 2023-09-22 RX ORDER — ONDANSETRON 2 MG/ML
4 INJECTION INTRAMUSCULAR; INTRAVENOUS EVERY 6 HOURS PRN
Status: DISCONTINUED | OUTPATIENT
Start: 2023-09-22 | End: 2023-09-22 | Stop reason: HOSPADM

## 2023-09-22 RX ADMIN — GABAPENTIN 300 MG: 300 CAPSULE ORAL at 11:27

## 2023-09-22 RX ADMIN — LIDOCAINE HYDROCHLORIDE 100 MG: 20 INJECTION, SOLUTION EPIDURAL; INFILTRATION; INTRACAUDAL; PERINEURAL at 12:26

## 2023-09-22 RX ADMIN — FENTANYL CITRATE 50 MCG: 50 INJECTION, SOLUTION INTRAMUSCULAR; INTRAVENOUS at 12:48

## 2023-09-22 RX ADMIN — ROCURONIUM BROMIDE 10 MG: 10 INJECTION, SOLUTION INTRAVENOUS at 13:17

## 2023-09-22 RX ADMIN — SCOPALAMINE 1 PATCH: 1 PATCH, EXTENDED RELEASE TRANSDERMAL at 11:27

## 2023-09-22 RX ADMIN — FENTANYL CITRATE 50 MCG: 50 INJECTION, SOLUTION INTRAMUSCULAR; INTRAVENOUS at 12:26

## 2023-09-22 RX ADMIN — ONDANSETRON 4 MG: 2 INJECTION INTRAMUSCULAR; INTRAVENOUS at 12:30

## 2023-09-22 RX ADMIN — Medication 200 MCG: at 13:06

## 2023-09-22 RX ADMIN — HYDROMORPHONE HYDROCHLORIDE 0.5 MG: 1 INJECTION, SOLUTION INTRAMUSCULAR; INTRAVENOUS; SUBCUTANEOUS at 13:20

## 2023-09-22 RX ADMIN — MIDAZOLAM HYDROCHLORIDE 2 MG: 1 INJECTION, SOLUTION INTRAMUSCULAR; INTRAVENOUS at 12:21

## 2023-09-22 RX ADMIN — SUGAMMADEX 200 MG: 100 INJECTION, SOLUTION INTRAVENOUS at 14:23

## 2023-09-22 RX ADMIN — ACETAMINOPHEN 975 MG: 325 TABLET, FILM COATED ORAL at 11:26

## 2023-09-22 RX ADMIN — PROPOFOL 150 MG: 10 INJECTION, EMULSION INTRAVENOUS at 12:26

## 2023-09-22 RX ADMIN — CELECOXIB 100 MG: 100 CAPSULE ORAL at 11:26

## 2023-09-22 RX ADMIN — FENTANYL CITRATE 50 MCG: 50 INJECTION INTRAMUSCULAR; INTRAVENOUS at 15:03

## 2023-09-22 RX ADMIN — KETOROLAC TROMETHAMINE 15 MG: 30 INJECTION, SOLUTION INTRAMUSCULAR at 14:20

## 2023-09-22 RX ADMIN — SODIUM CHLORIDE, SODIUM LACTATE, POTASSIUM CHLORIDE, AND CALCIUM CHLORIDE: .6; .31; .03; .02 INJECTION, SOLUTION INTRAVENOUS at 14:04

## 2023-09-22 RX ADMIN — DEXAMETHASONE SODIUM PHOSPHATE 10 MG: 10 INJECTION, SOLUTION INTRAMUSCULAR; INTRAVENOUS at 12:30

## 2023-09-22 RX ADMIN — Medication 30 MG: at 12:26

## 2023-09-22 RX ADMIN — CEFAZOLIN SODIUM 2000 MG: 2 SOLUTION INTRAVENOUS at 12:39

## 2023-09-22 RX ADMIN — ONDANSETRON 4 MG: 2 INJECTION INTRAMUSCULAR; INTRAVENOUS at 16:44

## 2023-09-22 RX ADMIN — ROCURONIUM BROMIDE 10 MG: 10 INJECTION, SOLUTION INTRAVENOUS at 13:41

## 2023-09-22 RX ADMIN — ROCURONIUM BROMIDE 50 MG: 10 INJECTION, SOLUTION INTRAVENOUS at 12:26

## 2023-09-22 RX ADMIN — SODIUM CHLORIDE, SODIUM LACTATE, POTASSIUM CHLORIDE, AND CALCIUM CHLORIDE 75 ML/HR: .6; .31; .03; .02 INJECTION, SOLUTION INTRAVENOUS at 11:31

## 2023-09-22 RX ADMIN — OXYCODONE HYDROCHLORIDE 5 MG: 5 TABLET ORAL at 16:44

## 2023-09-22 RX ADMIN — PHENAZOPYRIDINE 100 MG: 100 TABLET ORAL at 11:27

## 2023-09-22 NOTE — OP NOTE
OPERATIVE REPORT  PATIENT NAME: Oxana Gabriel    :  1975  MRN: [id number]  Pt Location: [facility] OR ROOM 04    SURGERY DATE: 2023    Surgeon(s) and Role:     * Glynn Dickerson MD - Primary     * Srinivas Don MD - Co-surgeon    Preop Diagnosis:  Menorrhagia with regular cycle [N92.0]    Post-Op Diagnosis Codes:     * Menorrhagia with regular cycle [N92.0]    Procedure(s):  Bilateral - HYSTERECTOMY LAPAROSCOPIC TOTAL WITH BILATERAL SALPINGO-OOPHERECTOMY    Specimen(s):  ID Type Source Tests Collected by Time Destination   1 : Uterus w/ Bilateral Ovaries and Fallopian Tubes and Cervix  Tissue Uterus w/Bilateral Ovaries and Fallopian Tubes TISSUE EXAM Glynn Dickerson MD 2023 [id number]        Estimated Blood Loss:   20 cc    Anesthesia Type:   General    Operative Indications:  Menorrhagia with regular cycle [N92.0]    Operative Findings:  Normal external female genitalia. Normal appearing cervix, nulliparous. Uterus mobile and sounding to 14 cm. Enlarged appearing uterus, though no fibroids visualized. Normal appearing bilateral fallopian tubes and ovaries. Bilateral ureteral jets visualized on cystoscopy. Complications:   None    Procedure and Technique:  The patient was met in the pre-operative holding area where consents and procedures were reviewed and all questions answered. She was brought to the Operating Room where general anesthesia was induced. The abdomen, vagina and perineum were prepped and draped. Atraumatic vaginal retractors were placed to identify the cervix and complete the examination. The cervix was grasped with a single tooth tenaculum. The cervix was easily dilated and the uterus sounded to 14 cm. A medium Koh ring was placed around the cervix. Then a 10 cm FRANCY cannula was placed in the uterine cavity without difficulty. A John catheter was placed in a sterile fashion.     A 5 mm incision was made in the umbilicus, and with the abdomen under anterior traction a 5 mm trocar was inserted with direct visualization. The peritoneal cavity was then insufflated with carbon dioxide on high flow to maintain a pressure of 15 mm Hg. Additional ports were placed under direct visualization with a 5mm in the right and 10mm port in the left lower quadrants. Procedure was completed in identical fashion bilaterally unless otherwise stated. The IP ligaments were ligated using the Ligasure. The round ligaments were cauterized and divided. The vesico-uterine peritoneum was divided, and the bladder carefully dissected from the cervix and upper vagina, to below the cervical-vaginal junction. The uterine vessels were then skeletonized bilaterally and coagulated at the level of the uterine isthmus  then divided. On both sides, the cardinal ligaments were then coagulated using the Ligasure device and divided. With the bladder mobilized anteriorally, using the monopolar hook device, an incision was made in the posterior aspect of the cervical-vaginal junction. The incision was continued circumferentially around the upper vagina. This allowed completely amputation of the specimen. The uterus, cervix, and bilateral fallopian tubes were then removed en bloc transvaginally. The upper vagina was then closed laparoscopically with endostitch using a running stratifix suture. The upper vagina was intact and hemostatic. The pelvis was irrigated again and hemostasis noted. The laparoscopic instruments were removed. The laparoscopic skin incisions were irrigated and noted to be hemostatic. The skin incisions were closed with subcuticular stitches of 4-0 Monocryl. The nam was removed and cystoscopy was performed. Bilateral ureteral jets were visualized, as was the bladder dome. No damage to the bladder was visualized on cystoscopy. The bladder was drained and the cystoscope was removed. All instruments were then removed from the vagina.       All sponge, needle and instrument counts were correct x2.  Anesthesia was reversed and the patient was taken to the PACU in a stable condition.       Patient Disposition:  PACU     SIGNATURE: Caprice Samuel MD  DATE: September 22, 2023  TIME: 2:51 PM

## 2023-09-22 NOTE — ANESTHESIA POSTPROCEDURE EVALUATION
Post-Op Assessment Note    CV Status:  Stable  Pain Score: 0    Pain management: adequate     Mental Status:  Awake and alert   Hydration Status:  Stable   PONV Controlled:  None   Airway Patency:  Patent and adequate      Post Op Vitals Reviewed: Yes      Staff: Anesthesiologist, CRNA         No notable events documented.     BP   117/56   Temp  98.9   Pulse  88   Resp   18   SpO2   100%

## 2023-09-22 NOTE — H&P
Assessment/Plan:    Menorrhagia with regular cycle  Options discussed:  Medications- hormonal treatments, TXA, IUD with progestin  Sugery: D&C with ablation, hysterectomy    Patient wants definitive treatment. Has family history ( paternal side) of ovarian and uterine cancer. Interested in surgery. Plan for Total Laparoscopic Hysterectomy with bilateral salpingoophorectomy    We reviewed the risks of the surgery including but not limited to infection, bleeding, injury to nearby organs (bowel. bladder, ureter, blood vessel, nerve) and possible long term consequences of such injury, as well as possibility of oopherectomy, conversion to laparotomy, need for blood transfusion and other resuscitative measures. Diagnoses and all orders for this visit:    Menorrhagia with regular cycle          Subjective:      Patient ID: Oxana Gabriel is a 53 y.o. female. Patient here for hysterectomy consult. Patient seen 3/23 for follow up from [facility]  procedure 2/24/23 to review pathology (Benign) and next steps. Has not noticed much improvement with menses since polypectomy. Discussed hysterectomy vs ablation at length with Dr. Isrrael Acosta at this appt. Leaning towards hysterectomy due to family hx of pelvic masses in grandmother. 47yo G3P 2 0 1 2 with menorrhagia. For over a year menses have been increasing in flow. Lasting up to 10-12 days. Using overnight pads with change every 1-2 hours. Has been evaluated by colleagues- had an attempt at EB and IUD insertion which were unsuccessful. D&C for polyp removal.  Wants definitive treatment. H/o csection x 2.  Pelvic ultrasound reviewed. EB/D&C pathology reviewed. Ovaries: discussed premature menopause and implications for early heart disease, osteoporosis, shorter life expectancy. ALso discussed possible need for repeat surgery in future for removal if concern for ovaries in the future. Also reviewed ERT for treatment of menopausal symptoms.  Plan for removal of ovaries. Patient has concerns due to family history of ovarian cancer. Gynecologic Exam  She complains of vaginal bleeding. She reports no genital itching, genital lesions, genital odor, genital rash, pelvic pain or vaginal discharge. This is a chronic problem. The current episode started more than 1 year ago. The problem occurs intermittently. The problem has been gradually worsening since onset. The patient is experiencing no pain. Pertinent negatives include no chills, constipation, diarrhea, dysuria, fever, flank pain, frequency, hematuria, nausea, sore throat, urgency or vomiting. The vaginal bleeding is heavier than menses. Patient has been passing clots. Patient has been passing tissue. The symptoms are aggravated by activity and heavy lifting. Past treatments include heating pads and NSAIDs. The treatment provided no relief. She is sexually active. The patient's menstrual history has been regular. Her past medical history is significant for a gynecological surgery. The following portions of the patient's history were reviewed and updated as appropriate:   She  has a past medical history of Anemia, Anesthesia, Asthma, Bell's palsy, COVID-19 (10/20/2020), Diabetes mellitus ([address]), Diabetes mellitus during pregnancy, Disease of thyroid gland, Hashimoto's disease, and Migraine.   She   Patient Active Problem List    Diagnosis Date Noted   • Type 2 diabetes mellitus with hyperglycemia, unspecified whether long term insulin use ([address]) 02/09/2023   • Iron deficiency anemia due to chronic blood loss 09/12/2019   • Family history of lupus erythematosus 09/12/2019   • Family history of rheumatoid arthritis 09/12/2019   • Arthralgia of both hands 09/12/2019   •  abnormal thyroid biopsy Atypia of undetermined significance ([place] Category  09/12/2019   • Vitamin D deficiency 06/07/2019   • Menorrhagia with regular cycle 06/03/2019   • Hashimoto's thyroiditis 05/22/2019   • Hypertriglyceridemia 2018   • Type 2 diabetes mellitus without complication, without long-term current use of insulin ([address]) 2018   • Obesity, morbid ([address]) 2017   • Asthma 2015   • Chondromalacia patellae 2015   • Seborrheic keratosis 2015     She  has a past surgical history that includes  section; Hysteroscopy; US guided thyroid biopsy (2019); US guided thyroid biopsy (2019); and pr hysteroscopy bx endometrium&/polypc w/wo d&c (N/A, 2023). Her family history includes Breast cancer in her maternal grandmother and paternal aunt; Cancer in her paternal grandmother; Cervical cancer in her maternal aunt and maternal grandmother; Endometrial cancer (age of onset: 21) in her maternal aunt; Endometrial cancer (age of onset: 27) in her paternal aunt; Heart disease in her father; Hyperlipidemia in her father; Hypertension in her father; Migraines in her mother; Ovarian cancer in her paternal aunt and paternal grandmother; Uterine cancer in her paternal aunt. She  reports that she has never smoked. She has never used smokeless tobacco. She reports current alcohol use. She reports that she does not use drugs. No current facility-administered medications for this encounter. No current facility-administered medications on file prior to encounter. Current Outpatient Medications on File Prior to Encounter   Medication Sig   • Insulin Glargine Solostar (Lantus SoloStar) 100 UNIT/ML SOPN Inject 0.1 mL (10 Units total) under the skin daily at bedtime   • levothyroxine 100 mcg tablet Take one tab po every other day (alt with 112 mcg)   • levothyroxine 112 mcg tablet Take one tab po every other day (alt with 110 mcg)   • Blood Glucose Monitoring Suppl ([employer] Blood Glucose Meter) w/Device KIT Test blood sugar three times daily. • glucose blood test strip Test blood sugar three times daily. • Insulin Pen Needle (CareFine Pen Needles) 32G X 5 MM MISC For use with insulin injections daily. • Lancets (Dakimcan Unistik 2) MISC Test blood sugar three times daily. She is allergic to iodinated contrast media. .    Review of Systems   Constitutional: Positive for fatigue. Negative for activity change, appetite change, chills and fever. HENT: Negative for rhinorrhea, sneezing and sore throat. Eyes: Negative for visual disturbance. Respiratory: Negative for cough, shortness of breath and wheezing. Cardiovascular: Negative for chest pain, palpitations and leg swelling. Gastrointestinal: Negative for abdominal distention, constipation, diarrhea, nausea and vomiting. Genitourinary: Positive for menstrual problem and vaginal bleeding. Negative for decreased urine volume, difficulty urinating, dyspareunia, dysuria, flank pain, frequency, genital sores, hematuria, pelvic pain, urgency, vaginal discharge and vaginal pain. Neurological: Negative for syncope and light-headedness. Objective:      LMP 09/01/2023   Physical Exam  Constitutional:       General: She is not in acute distress. Appearance: She is well-developed. She is not diaphoretic. Neck:      Vascular: No JVD. Cardiovascular:      Rate and Rhythm: Normal rate and regular rhythm. Heart sounds: Normal heart sounds. No murmur heard. No friction rub. No gallop. Pulmonary:      Effort: Pulmonary effort is normal. No respiratory distress. Breath sounds: Normal breath sounds. Abdominal:      General: Bowel sounds are normal. There is no distension. Palpations: Abdomen is soft. Tenderness: There is no abdominal tenderness. There is no guarding or rebound. Genitourinary:     Labia:         Right: No rash, tenderness or lesion. Left: No rash, tenderness or lesion. Vagina: Normal. No vaginal discharge, erythema or tenderness. Cervix: No cervical motion tenderness, discharge or friability. Uterus: Not deviated, not enlarged, not fixed and not tender.        Adnexa:         Right: No mass, tenderness or fullness. Left: No mass, tenderness or fullness. Musculoskeletal:      Cervical back: Neck supple. Right lower leg: No edema. Left lower leg: No edema. Lymphadenopathy:      Cervical: No cervical adenopathy. Neurological:      Mental Status: She is alert and oriented to person, place, and time. Deep Tendon Reflexes: Reflexes are normal and symmetric.

## 2023-09-22 NOTE — INTERVAL H&P NOTE
H&P reviewed. After examining the patient I find no changes in the patients condition since the H&P had been written.     Vitals:    09/22/23 1114   BP: 163/71   Pulse: 101   Resp: 18   Temp: (!) 97.2 °F (36.2 °C)   SpO2: 99%

## 2023-09-25 PROCEDURE — 88307 TISSUE EXAM BY PATHOLOGIST: CPT | Performed by: PATHOLOGY

## 2023-10-03 ENCOUNTER — OFFICE VISIT (OUTPATIENT)
Dept: OBGYN CLINIC | Facility: CLINIC | Age: 48
End: 2023-10-03

## 2023-10-03 VITALS
BODY MASS INDEX: 42.65 KG/M2 | HEIGHT: 65 IN | DIASTOLIC BLOOD PRESSURE: 90 MMHG | SYSTOLIC BLOOD PRESSURE: 144 MMHG | WEIGHT: 256 LBS

## 2023-10-03 DIAGNOSIS — Z09 POSTOPERATIVE EXAMINATION: Primary | ICD-10-CM

## 2023-10-03 PROCEDURE — 99024 POSTOP FOLLOW-UP VISIT: CPT | Performed by: OBSTETRICS & GYNECOLOGY

## 2023-10-03 NOTE — PATIENT INSTRUCTIONS
Menopause   WHAT YOU NEED TO KNOW:   What is menopause? Menopause is a normal stage in a person's life when monthly periods stop. You are considered to be in menopause when you have not had a period for a full year after the age of 36. Menopause usually occurs between ages 52 to 48. Perimenopause is a stage before menopause that may cause signs and symptoms similar to menopause. Perimenopause may start about 4 years before menopause. What causes menopause? Menopause starts when the ovaries stop making the female hormones estrogen and progesterone. After menopause, you are no longer able to become pregnant. Any of the following may trigger menopause or early menopause:  Surgery, including a hysterectomy or oophorectomy    Family history of early menopause    Smoking    Chemotherapy or pelvic radiation    Chromosome abnormalities, including Yu syndrome and Fragile X syndrome    Premature ovarian insufficiency (the ovaries stop producing eggs before age 36)    What are the signs and symptoms of menopause? Irregular menstrual cycles with heavy vaginal bleeding followed by decreased bleeding until it stops    Hot flashes (feeling warm, flushed, and sweaty)    Vaginal changes such as increased dryness    Mood changes such as anxiety, depression, or decreased desire to have sex    Trouble sleeping, joint pain, headaches    Brittle nails, hair on chin or chest where it is normally absent    Decrease in breast size and change in skin texture    Weight gain    How is menopause treated or managed? Hormone replacement therapy (HRT) is medicine that replaces your low hormone levels. HRT contains estrogen and sometimes progestin. HRT has several benefits. HRT helps prevent osteoporosis, which decreases your risk for bone fractures. HRT also protects you from colorectal cancer. HRT also has some risks. HRT increases your risk for breast cancer, blood clots, heart disease, a heart attack, or a stroke.  If you are 72 years or older, HRT can also increase your risk for dementia. Your risk for uterine or endometrial cancer, gallbladder disease, and urinary incontinence is higher if you take estrogen-only HRT. Manage hot flashes. Hot flashes are brief periods of feeling very warm, flushed, and sweaty. Hot flashes can last from a few seconds to several minutes. They may happen many times during the day, and are common at night. Layer your clothing so that you can easily remove some clothing and cool yourself during a hot flash. Cold drinks may also be helpful. Non-hormone medicines can help relieve or prevent hot flashes. Examples include certain antidepressants, nerve medicines, and high blood pressure medicines. Reduce vaginal dryness by using over-the-counter vaginal creams. Vaginal dryness may cause you to have pain or discomfort during sex. Only use creams that are made for vaginal use. Do  not  use petroleum jelly. You may put an estrogen cream in and around your vagina. Estrogen cream may help decrease vaginal dryness and lower your risk of vaginal infections. Continue to use birth control during perimenopause if you do not want to get pregnant. You may need to use birth control until it has been 1 year since your periods stopped. Ask your healthcare provider when you can stop using birth control to prevent pregnancy. How can I live a healthy lifestyle during and after menopause? After menopause, your risk for heart disease and bone loss increases. Ask about these and other ways to stay healthy:  Exercise regularly. Exercise helps you maintain a healthy weight. Exercise can also help to control your blood pressure and cholesterol levels. Include weight-bearing exercise for strong bones. Weight bearing exercise is recommended for at least 30 minutes, 3 times a week. Ask your healthcare provider about the best exercise plan for you. Eat a variety of healthy foods.   Include fruits, vegetables, whole grains (whole-wheat bread, pasta, and cereals), low-fat dairy, and lean protein foods (beans, poultry, and fish). Limit foods high in sodium (salt). Ask your healthcare provider for more information about a meal plan that is right for you. Do not smoke. If you smoke, it is never too late to quit. You are more likely to have a heart attack, lung disease, blood clots, and cancer if you smoke. Ask your healthcare provider for information if you need help quitting. Take supplements as directed. You may need extra calcium and vitamin D to help prevent osteoporosis. Limit alcohol and caffeine. Alcohol and caffeine may worsen your symptoms. When should I call my doctor? You have vaginal bleeding after menopause. You have questions or concerns about your condition or care. CARE AGREEMENT:   You have the right to help plan your care. Learn about your health condition and how it may be treated. Discuss treatment options with your healthcare providers to decide what care you want to receive. You always have the right to refuse treatment. The above information is an  only. It is not intended as medical advice for individual conditions or treatments. Talk to your doctor, nurse or pharmacist before following any medical regimen to see if it is safe and effective for you. © Copyright Mila Ceferino 2023 Information is for End User's use only and may not be sold, redistributed or otherwise used for commercial purposes.

## 2023-10-03 NOTE — PROGRESS NOTES
Assessment/Plan:         Diagnoses and all orders for this visit:    Postoperative examination  Comments:  TLH-BSO Reviewed expected recovery. Slow return of appetite, menopausal symptoms, slowly increase activity Follow up 3-4 weeks    Other orders  -     metFORMIN (GLUCOPHAGE) 500 mg tablet;  (Patient not taking: Reported on 10/3/2023)          Subjective:      Patient ID: Darnell Oropeza is a 53 y.o. female. Patient here for follow up. She is s/p hysterectomy 9/22/23. Feeling well overall. Some aches and pains. Also reports some bleeding from incision sites. Thinks she may have over exerted herself over this past weekend. Tolerating PO - some decrease in appetite. Passing gas. Bowels have returned to normal.   Episode of vomiting earlier this week. Bladder normal  Pain - ibuprofen/tylenol as needed  Menopause symtpoms- mood changes hot flashes. Revisit this to see if interested in ERT           The following portions of the patient's history were reviewed and updated as appropriate:   She  has a past medical history of Anemia, Anesthesia, Asthma, Bell's palsy, COVID-19 (10/20/2020), Diabetes mellitus ([address]), Diabetes mellitus during pregnancy, Disease of thyroid gland, Hashimoto's disease, and Migraine.   She   Patient Active Problem List    Diagnosis Date Noted   • Type 2 diabetes mellitus with hyperglycemia, unspecified whether long term insulin use ([address]) 02/09/2023   • Iron deficiency anemia due to chronic blood loss 09/12/2019   • Family history of lupus erythematosus 09/12/2019   • Family history of rheumatoid arthritis 09/12/2019   • Arthralgia of both hands 09/12/2019   •  abnormal thyroid biopsy Atypia of undetermined significance ([place] Category  09/12/2019   • Vitamin D deficiency 06/07/2019   • Menorrhagia with regular cycle 06/03/2019   • Hashimoto's thyroiditis 05/22/2019   • Hypertriglyceridemia 08/31/2018   • Type 2 diabetes mellitus without complication, without long-term current use of insulin ([address]) 2018   • Obesity, morbid ([address] ) 2017   • Asthma 2015   • Chondromalacia patellae 2015   • Seborrheic keratosis 2015     She  has a past surgical history that includes  section; Hysteroscopy; US guided thyroid biopsy (2019); US guided thyroid biopsy (2019); pr hysteroscopy bx endometrium&/polypc w/wo d&c (N/A, 2023); and pr laps total hysterect 250 gm/< w/rmvl tube/ovary (Bilateral, 2023). Her family history includes Breast cancer in her maternal grandmother and paternal aunt; Cancer in her paternal grandmother; Cervical cancer in her maternal aunt and maternal grandmother; Endometrial cancer (age of onset: 21) in her maternal aunt; Endometrial cancer (age of onset: 27) in her paternal aunt; Heart disease in her father; Hyperlipidemia in her father; Hypertension in her father; Migraines in her mother; Ovarian cancer in her paternal aunt and paternal grandmother; Uterine cancer in her paternal aunt. She  reports that she has never smoked. She has never used smokeless tobacco. She reports current alcohol use. She reports that she does not use drugs. Current Outpatient Medications   Medication Sig Dispense Refill   • acetaminophen (TYLENOL) 325 mg tablet Take 2 tablets (650 mg total) by mouth every 6 (six) hours as needed for mild pain  0   • Blood Glucose Monitoring Suppl ([employer] Blood Glucose Meter) w/Device KIT Test blood sugar three times daily. 1 kit 2   • glucose blood test strip Test blood sugar three times daily. 200 strip 5   • ibuprofen (MOTRIN) 600 mg tablet Take 1 tablet (600 mg total) by mouth every 6 (six) hours as needed for moderate pain 30 tablet 0   • Insulin Glargine Solostar (Lantus SoloStar) 100 UNIT/ML SOPN Inject 0.1 mL (10 Units total) under the skin daily at bedtime 3 mL 2   • Insulin Pen Needle (CareFine Pen Needles) 32G X 5 MM MISC For use with insulin injections daily.  100 each 5   • Lancets ([employer]icscan Unistik 2) MISC Test blood sugar three times daily. 200 each 5   • levothyroxine 100 mcg tablet Take one tab po every other day (alt with 112 mcg) 90 tablet 0   • levothyroxine 112 mcg tablet Take one tab po every other day (alt with 110 mcg) 90 tablet 0   • metFORMIN (GLUCOPHAGE) 1000 MG tablet Take 1 tablet (1,000 mg total) by mouth 2 (two) times a day with meals 180 tablet 3   • metFORMIN (GLUCOPHAGE) 500 mg tablet  (Patient not taking: Reported on 10/3/2023)       No current facility-administered medications for this visit. Current Outpatient Medications on File Prior to Visit   Medication Sig   • acetaminophen (TYLENOL) 325 mg tablet Take 2 tablets (650 mg total) by mouth every 6 (six) hours as needed for mild pain   • Blood Glucose Monitoring Suppl ([employer] Blood Glucose Meter) w/Device KIT Test blood sugar three times daily. • glucose blood test strip Test blood sugar three times daily. • ibuprofen (MOTRIN) 600 mg tablet Take 1 tablet (600 mg total) by mouth every 6 (six) hours as needed for moderate pain   • Insulin Glargine Solostar (Lantus SoloStar) 100 UNIT/ML SOPN Inject 0.1 mL (10 Units total) under the skin daily at bedtime   • Insulin Pen Needle (CareFine Pen Needles) 32G X 5 MM MISC For use with insulin injections daily. • Lancets (LifeScan Unistik 2) MISC Test blood sugar three times daily.    • levothyroxine 100 mcg tablet Take one tab po every other day (alt with 112 mcg)   • levothyroxine 112 mcg tablet Take one tab po every other day (alt with 110 mcg)   • metFORMIN (GLUCOPHAGE) 1000 MG tablet Take 1 tablet (1,000 mg total) by mouth 2 (two) times a day with meals   • metFORMIN (GLUCOPHAGE) 500 mg tablet  (Patient not taking: Reported on 10/3/2023)   • [] oxyCODONE (ROXICODONE) 5 immediate release tablet Take 1 tablet (5 mg total) by mouth every 4 (four) hours as needed for severe pain for up to 10 days Max Daily Amount: 30 mg (Patient not taking: Reported on 10/3/2023)     No current facility-administered medications on file prior to visit. She is allergic to iodinated contrast media. .    Review of Systems   Constitutional: Negative for activity change, appetite change, chills, fatigue and fever. HENT: Negative for rhinorrhea, sneezing and sore throat. Eyes: Negative for visual disturbance. Respiratory: Negative for cough, shortness of breath and wheezing. Cardiovascular: Negative for chest pain, palpitations and leg swelling. Gastrointestinal: Negative for abdominal distention, constipation, diarrhea, nausea and vomiting. Genitourinary: Negative for difficulty urinating. Neurological: Negative for syncope and light-headedness. Objective:      /90 (BP Location: Left arm, Patient Position: Sitting, Cuff Size: Standard)   Ht 5' 5" (1.651 m)   Wt 116 kg (256 lb)   LMP 09/01/2023   BMI 42.60 kg/m²          Physical Exam  Vitals and nursing note reviewed. Abdominal:      General: Bowel sounds are normal. There is no distension. Palpations: Abdomen is soft. Tenderness: There is no abdominal tenderness. There is no guarding or rebound. Comments: Inc c/d/i   Genitourinary:     Vagina: Normal. No vaginal discharge, erythema, tenderness or bleeding.

## 2023-10-31 NOTE — PROGRESS NOTES
Assessment/Plan:    No problem-specific Assessment & Plan notes found for this encounter. Diagnoses and all orders for this visit:    Postoperative examination  Comments:  Healing well. Plan to return to usual activity 6 weeks. Elevated blood pressure reading  Comments:  Recommend follow up with PCP    Menopause  Comments:  Treatments reviewed: ERT, OTC treatment, behavioral chantes          Subjective:      Patient ID: Shama Philip is a 53 y.o. female. Patient here for her second post op follow up from 10/3/23. She is s/p hysterectomy 9/22/23. She is doing well overall, some pain and bloating in abdomen when eats too much or havent had bowel movements. Would like to discuss hotflashes and change in emotions--crying at times. BP elevated. Checked both arms. Patient is asymptomatic. The following portions of the patient's history were reviewed and updated as appropriate: She  has a past medical history of Anemia, Anesthesia, Asthma, Bell's palsy, COVID-19 (10/20/2020), Diabetes mellitus ([address]), Diabetes mellitus during pregnancy, Disease of thyroid gland, Hashimoto's disease, and Migraine.   She   Patient Active Problem List    Diagnosis Date Noted    Type 2 diabetes mellitus with hyperglycemia, unspecified whether long term insulin use ([address]) 02/09/2023    Iron deficiency anemia due to chronic blood loss 09/12/2019    Family history of lupus erythematosus 09/12/2019    Family history of rheumatoid arthritis 09/12/2019    Arthralgia of both hands 09/12/2019     abnormal thyroid biopsy Atypia of undetermined significance ([place] Category  09/12/2019    Vitamin D deficiency 06/07/2019    Menorrhagia with regular cycle 06/03/2019    Hashimoto's thyroiditis 05/22/2019    Hypertriglyceridemia 08/31/2018    Type 2 diabetes mellitus without complication, without long-term current use of insulin ([address]) 08/31/2018    Obesity, morbid ([address]) 09/14/2017    Asthma 04/28/2015    Chondromalacia patellae 2015    Seborrheic keratosis 2015     She  has a past surgical history that includes  section; Hysteroscopy; US guided thyroid biopsy (2019); US guided thyroid biopsy (2019); pr hysteroscopy bx endometrium&/polypc w/wo d&c (N/A, 2023); and pr laps total hysterect 250 gm/< w/rmvl tube/ovary (Bilateral, 2023). Her family history includes Breast cancer in her maternal grandmother and paternal aunt; Cancer in her paternal grandmother; Cervical cancer in her maternal aunt and maternal grandmother; Endometrial cancer (age of onset: 21) in her maternal aunt; Endometrial cancer (age of onset: 27) in her paternal aunt; Heart disease in her father; Hyperlipidemia in her father; Hypertension in her father; Migraines in her mother; Ovarian cancer in her paternal aunt and paternal grandmother; Uterine cancer in her paternal aunt. She  reports that she has never smoked. She has never used smokeless tobacco. She reports current alcohol use. She reports that she does not use drugs. Current Outpatient Medications   Medication Sig Dispense Refill    acetaminophen (TYLENOL) 325 mg tablet Take 2 tablets (650 mg total) by mouth every 6 (six) hours as needed for mild pain  0    Blood Glucose Monitoring Suppl ([employer] Blood Glucose Meter) w/Device KIT Test blood sugar three times daily. 1 kit 2    glucose blood test strip Test blood sugar three times daily. 200 strip 5    ibuprofen (MOTRIN) 600 mg tablet Take 1 tablet (600 mg total) by mouth every 6 (six) hours as needed for moderate pain 30 tablet 0    Insulin Glargine Solostar (Lantus SoloStar) 100 UNIT/ML SOPN Inject 0.1 mL (10 Units total) under the skin daily at bedtime 3 mL 2    Insulin Pen Needle (CareFine Pen Needles) 32G X 5 MM MISC For use with insulin injections daily. 100 each 5    Lancets (LifeScan Unistik 2) MISC Test blood sugar three times daily.  200 each 5    levothyroxine 100 mcg tablet Take one tab po every other day (alt with 112 mcg) 90 tablet 0    levothyroxine 112 mcg tablet Take one tab po every other day (alt with 110 mcg) 90 tablet 0    metFORMIN (GLUCOPHAGE) 1000 MG tablet Take 1 tablet (1,000 mg total) by mouth 2 (two) times a day with meals 180 tablet 3    metFORMIN (GLUCOPHAGE) 500 mg tablet  (Patient not taking: Reported on 10/3/2023)       No current facility-administered medications for this visit. Current Outpatient Medications on File Prior to Visit   Medication Sig    acetaminophen (TYLENOL) 325 mg tablet Take 2 tablets (650 mg total) by mouth every 6 (six) hours as needed for mild pain    Blood Glucose Monitoring Suppl ([employer] Blood Glucose Meter) w/Device KIT Test blood sugar three times daily. glucose blood test strip Test blood sugar three times daily. ibuprofen (MOTRIN) 600 mg tablet Take 1 tablet (600 mg total) by mouth every 6 (six) hours as needed for moderate pain    Insulin Glargine Solostar (Lantus SoloStar) 100 UNIT/ML SOPN Inject 0.1 mL (10 Units total) under the skin daily at bedtime    Insulin Pen Needle (Radisphere Radiology Pen Needles) 32G X 5 MM MISC For use with insulin injections daily. Lancets (Mobi Techcan Unistik 2) MISC Test blood sugar three times daily. levothyroxine 100 mcg tablet Take one tab po every other day (alt with 112 mcg)    levothyroxine 112 mcg tablet Take one tab po every other day (alt with 110 mcg)    metFORMIN (GLUCOPHAGE) 1000 MG tablet Take 1 tablet (1,000 mg total) by mouth 2 (two) times a day with meals    metFORMIN (GLUCOPHAGE) 500 mg tablet  (Patient not taking: Reported on 10/3/2023)     No current facility-administered medications on file prior to visit. She is allergic to iodinated contrast media. .    Review of Systems   Constitutional:  Negative for activity change, appetite change, chills, fatigue and fever. HENT:  Negative for rhinorrhea, sneezing and sore throat. Eyes:  Negative for visual disturbance.    Respiratory:  Negative for cough, shortness of breath and wheezing. Cardiovascular:  Negative for chest pain, palpitations and leg swelling. Gastrointestinal:  Negative for abdominal distention, constipation, diarrhea, nausea and vomiting. Genitourinary:  Negative for difficulty urinating. Neurological:  Negative for syncope and light-headedness. Objective:      BP (!) 170/102 (BP Location: Left arm, Patient Position: Sitting, Cuff Size: Extra-Large)   Ht 5' 5" (1.651 m)   Wt 117 kg (258 lb 9.6 oz)   LMP 09/01/2023   BMI 43.03 kg/m²          Physical Exam  Vitals and nursing note reviewed. Abdominal:      General: Bowel sounds are normal. There is no distension. Palpations: Abdomen is soft. Tenderness: There is no abdominal tenderness. There is no guarding or rebound. Comments: C/D/I   Genitourinary:     Vagina: Normal. No vaginal discharge, erythema, tenderness or bleeding.

## 2023-11-01 ENCOUNTER — OFFICE VISIT (OUTPATIENT)
Dept: OBGYN CLINIC | Facility: CLINIC | Age: 48
End: 2023-11-01

## 2023-11-01 VITALS
BODY MASS INDEX: 43.09 KG/M2 | WEIGHT: 258.6 LBS | HEIGHT: 65 IN | SYSTOLIC BLOOD PRESSURE: 170 MMHG | DIASTOLIC BLOOD PRESSURE: 102 MMHG

## 2023-11-01 DIAGNOSIS — Z09 POSTOPERATIVE EXAMINATION: Primary | ICD-10-CM

## 2023-11-01 DIAGNOSIS — R03.0 ELEVATED BLOOD PRESSURE READING: ICD-10-CM

## 2023-11-01 DIAGNOSIS — Z78.0 MENOPAUSE: ICD-10-CM

## 2023-11-01 PROCEDURE — 99024 POSTOP FOLLOW-UP VISIT: CPT | Performed by: OBSTETRICS & GYNECOLOGY

## 2023-11-01 NOTE — LETTER
November 1, 2023     Patient: Katherine Bedolla   YOB: 1975   Date of Visit: 11/1/2023       To Whom It May Concern: It is my medical opinion that [employer] Duckworth may return to work on 11/6/2023 without restrictions . If you have any questions or concerns, please don't hesitate to call.          Sincerely,        Berna Farias MD    CC: No Recipients

## 2023-11-01 NOTE — PATIENT INSTRUCTIONS
Menopause   WHAT YOU NEED TO KNOW:   What is menopause? Menopause is a normal stage in a person's life when monthly periods stop. You are considered to be in menopause when you have not had a period for a full year after the age of 36. Menopause usually occurs between ages 52 to 48. Perimenopause is a stage before menopause that may cause signs and symptoms similar to menopause. Perimenopause may start about 4 years before menopause. What causes menopause? Menopause starts when the ovaries stop making the female hormones estrogen and progesterone. After menopause, you are no longer able to become pregnant. Any of the following may trigger menopause or early menopause:  Surgery, including a hysterectomy or oophorectomy    Family history of early menopause    Smoking    Chemotherapy or pelvic radiation    Chromosome abnormalities, including Yu syndrome and Fragile X syndrome    Premature ovarian insufficiency (the ovaries stop producing eggs before age 36)    What are the signs and symptoms of menopause? Irregular menstrual cycles with heavy vaginal bleeding followed by decreased bleeding until it stops    Hot flashes (feeling warm, flushed, and sweaty)    Vaginal changes such as increased dryness    Mood changes such as anxiety, depression, or decreased desire to have sex    Trouble sleeping, joint pain, headaches    Brittle nails, hair on chin or chest where it is normally absent    Decrease in breast size and change in skin texture    Weight gain    How is menopause treated or managed? Hormone replacement therapy (HRT) is medicine that replaces your low hormone levels. HRT contains estrogen and sometimes progestin. HRT has several benefits. HRT helps prevent osteoporosis, which decreases your risk for bone fractures. HRT also protects you from colorectal cancer. HRT also has some risks. HRT increases your risk for breast cancer, blood clots, heart disease, a heart attack, or a stroke.  If you are 72 years or older, HRT can also increase your risk for dementia. Your risk for uterine or endometrial cancer, gallbladder disease, and urinary incontinence is higher if you take estrogen-only HRT. Manage hot flashes. Hot flashes are brief periods of feeling very warm, flushed, and sweaty. Hot flashes can last from a few seconds to several minutes. They may happen many times during the day, and are common at night. Layer your clothing so that you can easily remove some clothing and cool yourself during a hot flash. Cold drinks may also be helpful. Non-hormone medicines can help relieve or prevent hot flashes. Examples include certain antidepressants, nerve medicines, and high blood pressure medicines. Reduce vaginal dryness by using over-the-counter vaginal creams. Vaginal dryness may cause you to have pain or discomfort during sex. Only use creams that are made for vaginal use. Do  not  use petroleum jelly. You may put an estrogen cream in and around your vagina. Estrogen cream may help decrease vaginal dryness and lower your risk of vaginal infections. Continue to use birth control during perimenopause if you do not want to get pregnant. You may need to use birth control until it has been 1 year since your periods stopped. Ask your healthcare provider when you can stop using birth control to prevent pregnancy. How can I live a healthy lifestyle during and after menopause? After menopause, your risk for heart disease and bone loss increases. Ask about these and other ways to stay healthy:  Exercise regularly. Exercise helps you maintain a healthy weight. Exercise can also help to control your blood pressure and cholesterol levels. Include weight-bearing exercise for strong bones. Weight bearing exercise is recommended for at least 30 minutes, 3 times a week. Ask your healthcare provider about the best exercise plan for you. Eat a variety of healthy foods.   Include fruits, vegetables, whole grains (whole-wheat bread, pasta, and cereals), low-fat dairy, and lean protein foods (beans, poultry, and fish). Limit foods high in sodium (salt). Ask your healthcare provider for more information about a meal plan that is right for you. Do not smoke. If you smoke, it is never too late to quit. You are more likely to have a heart attack, lung disease, blood clots, and cancer if you smoke. Ask your healthcare provider for information if you need help quitting. Take supplements as directed. You may need extra calcium and vitamin D to help prevent osteoporosis. Limit alcohol and caffeine. Alcohol and caffeine may worsen your symptoms. When should I call my doctor? You have vaginal bleeding after menopause. You have questions or concerns about your condition or care. CARE AGREEMENT:   You have the right to help plan your care. Learn about your health condition and how it may be treated. Discuss treatment options with your healthcare providers to decide what care you want to receive. You always have the right to refuse treatment. The above information is an  only. It is not intended as medical advice for individual conditions or treatments. Talk to your doctor, nurse or pharmacist before following any medical regimen to see if it is safe and effective for you. © Copyright [facility] 2023 Information is for End User's use only and may not be sold, redistributed or otherwise used for commercial purposes.

## 2023-12-03 DIAGNOSIS — E11.9 TYPE 2 DIABETES MELLITUS WITHOUT COMPLICATION, WITHOUT LONG-TERM CURRENT USE OF INSULIN (HCC): Primary | ICD-10-CM

## 2023-12-05 ENCOUNTER — RA CDI HCC (OUTPATIENT)
Dept: OTHER | Facility: HOSPITAL | Age: 48
End: 2023-12-05

## 2023-12-05 NOTE — PROGRESS NOTES
[address] [facility] coding opportunities          Chart Reviewed number of suggestions sent to Provider: 1   E66.01    Patients Insurance        Commercial Insurance: [employer]

## 2023-12-12 ENCOUNTER — OFFICE VISIT (OUTPATIENT)
Dept: INTERNAL MEDICINE CLINIC | Facility: CLINIC | Age: 48
End: 2023-12-12
Payer: COMMERCIAL

## 2023-12-12 VITALS
DIASTOLIC BLOOD PRESSURE: 92 MMHG | WEIGHT: 252.8 LBS | BODY MASS INDEX: 42.12 KG/M2 | RESPIRATION RATE: 18 BRPM | HEART RATE: 99 BPM | SYSTOLIC BLOOD PRESSURE: 144 MMHG | OXYGEN SATURATION: 99 % | HEIGHT: 65 IN

## 2023-12-12 DIAGNOSIS — Z12.31 SCREENING MAMMOGRAM FOR BREAST CANCER: ICD-10-CM

## 2023-12-12 DIAGNOSIS — Z12.11 COLON CANCER SCREENING: ICD-10-CM

## 2023-12-12 DIAGNOSIS — E11.9 TYPE 2 DIABETES MELLITUS WITHOUT COMPLICATION, WITHOUT LONG-TERM CURRENT USE OF INSULIN (HCC): Primary | ICD-10-CM

## 2023-12-12 DIAGNOSIS — I10 PRIMARY HYPERTENSION: ICD-10-CM

## 2023-12-12 PROCEDURE — 99214 OFFICE O/P EST MOD 30 MIN: CPT | Performed by: INTERNAL MEDICINE

## 2023-12-12 RX ORDER — LISINOPRIL 5 MG/1
5 TABLET ORAL DAILY
Qty: 90 TABLET | Refills: 3 | Status: SHIPPED | OUTPATIENT
Start: 2023-12-12

## 2023-12-12 NOTE — PROGRESS NOTES
Assessment/Plan:    Patient is here for routine follow-up. We reviewed her chronic medical problems. Reviewed recent blood work. Ordered labs for 3 months including CBC CMP TSH A1c lipid microalbumin. Diabetes is not at goal, last A1c 7.8. Recheck A1c before next visit in 3 months, continue metformin continue Lantus, we recently increased the metformin dose to thousand twice a day, add Mounjaro 2.5 mg.  May need a prior Auth. Start lisinopril 5 mg for hypertension. Buy blood pressure cuff. Check TSH before next visit that was also recently adjusted. Due for mammogram in January, due for screening colonoscopy which has been ordered today. Quality Measures:     BMI Counseling: Body mass index is 42.07 kg/m². The BMI is above normal. Nutrition recommendations include decreasing portion sizes and encouraging healthy choices of fruits and vegetables. Exercise recommendations include moderate physical activity 150 minutes/week. Rationale for BMI follow-up plan is due to patient being overweight or obese. Depression Screening and Follow-up Plan: Patient was screened for depression during today's encounter. They screened negative with a PHQ-2 score of 0. Return in about 3 months (around 3/12/2024). No problem-specific Assessment & Plan notes found for this encounter. Diagnoses and all orders for this visit:    Type 2 diabetes mellitus without complication, without long-term current use of insulin (HCC)  -     tirzepatide 2.5 MG/0.5ML; Inject 0.5 mL (2.5 mg total) under the skin every 7 days  -     Albumin / creatinine urine ratio; Future  -     Comprehensive metabolic panel; Future  -     Hemoglobin A1C; Future  -     Lipid Panel with Direct LDL reflex; Future  -     TSH, 3rd generation with Free T4 reflex; Future  -     CBC and differential; Future    Primary hypertension  -     lisinopril (ZESTRIL) 5 mg tablet;  Take 1 tablet (5 mg total) by mouth daily    Colon cancer screening  - Ambulatory Referral to Gastroenterology; Future    Screening mammogram for breast cancer  -     Mammo screening bilateral w 3d & cad; Future          Subjective:      Patient ID: Kale Humphreys is a 53 y.o. female. Here for routine f/u. ALLERGIES:  Allergies   Allergen Reactions    Iodinated Contrast Media Anaphylaxis     SOB, chest tightness, required epinephrine       CURRENT MEDICATIONS:    Current Outpatient Medications:     acetaminophen (TYLENOL) 325 mg tablet, Take 2 tablets (650 mg total) by mouth every 6 (six) hours as needed for mild pain, Disp: , Rfl: 0    Blood Glucose Monitoring Suppl ([employer] Blood Glucose Meter) w/Device KIT, Test blood sugar three times daily. , Disp: 1 kit, Rfl: 2    glucose blood test strip, Test blood sugar three times daily. , Disp: 200 strip, Rfl: 5    ibuprofen (MOTRIN) 600 mg tablet, Take 1 tablet (600 mg total) by mouth every 6 (six) hours as needed for moderate pain, Disp: 30 tablet, Rfl: 0    Insulin Glargine Solostar (Lantus SoloStar) 100 UNIT/ML SOPN, Inject 0.1 mL (10 Units total) under the skin daily at bedtime, Disp: 3 mL, Rfl: 2    Insulin Pen Needle (Zulue Pen Needles) 32G X 5 MM MISC, For use with insulin injections daily. , Disp: 100 each, Rfl: 5    Lancets ([employer] Un[employer]ik 2) MISC, Test blood sugar three times daily. , Disp: 200 each, Rfl: 5    levothyroxine 100 mcg tablet, Take one tab po every other day (alt with 112 mcg), Disp: 90 tablet, Rfl: 0    levothyroxine 112 mcg tablet, Take one tab po every other day (alt with 110 mcg), Disp: 90 tablet, Rfl: 0    lisinopril (ZESTRIL) 5 mg tablet, Take 1 tablet (5 mg total) by mouth daily, Disp: 90 tablet, Rfl: 3    metFORMIN (GLUCOPHAGE) 1000 MG tablet, Take 1 tablet (1,000 mg total) by mouth 2 (two) times a day with meals, Disp: 180 tablet, Rfl: 3    tirzepatide 2.5 MG/0.5ML, Inject 0.5 mL (2.5 mg total) under the skin every 7 days, Disp: 2 mL, Rfl: 0    ACTIVE PROBLEM LIST:  Patient Active Problem List Diagnosis    Asthma    Chondromalacia patellae    Obesity, morbid (HCC)    Seborrheic keratosis    Hypertriglyceridemia    Type 2 diabetes mellitus without complication, without long-term current use of insulin (HCC)    Hashimoto's thyroiditis    Menorrhagia with regular cycle    Vitamin D deficiency    Iron deficiency anemia due to chronic blood loss    Family history of lupus erythematosus    Family history of rheumatoid arthritis    Arthralgia of both hands     abnormal thyroid biopsy Atypia of undetermined significance ([place] Category     Type 2 diabetes mellitus with hyperglycemia, unspecified whether long term insulin use (HCC)       PAST MEDICAL HISTORY:  Past Medical History:   Diagnosis Date    Anemia     Anesthesia     Cardiac event    Asthma     Bell's palsy     COVID-19 10/20/2020    Diabetes mellitus ([address])     borderline-diet controlled    Diabetes mellitus during pregnancy     Disease of thyroid gland     Hashimoto's disease     Migraine        PAST SURGICAL HISTORY:  Past Surgical History:   Procedure Laterality Date     SECTION      HYSTEROSCOPY      with resection of intrauterine septum    MT HYSTEROSCOPY BX ENDOMETRIUM&/POLYPC W/WO D&C N/A 2023    Procedure: DILATATION AND CURETTAGE (D&C) WITH HYSTEROSCOPY;  Surgeon: Baylee Sage MD;  Location: [facility];  Service: Gynecology    MT LAPS TOTAL HYSTERECT 250 GM/< W/RMVL TUBE/OVARY Bilateral 2023    Procedure: HYSTERECTOMY LAPAROSCOPIC TOTAL WITH BILATERAL SALPINGO-OOPHERECTOMY;  Surgeon: Sarah Foy MD;  Location: [facility];  Service: Gynecology    US GUIDED THYROID BIOPSY  2019    US GUIDED THYROID BIOPSY  2019       FAMILY HISTORY:  Family History   Problem Relation Age of Onset    Migraines Mother     Heart disease Father         cardiac disorder    Hyperlipidemia Father     Hypertension Father     Breast cancer Maternal Grandmother     Cervical cancer Maternal Grandmother     Ovarian cancer Paternal Grandmother     Cancer Paternal Grandmother     Cervical cancer Maternal Aunt     Endometrial cancer Maternal Aunt 21    Breast cancer Paternal Aunt     Endometrial cancer Paternal Aunt 27    Uterine cancer Paternal Aunt     Ovarian cancer Paternal Aunt     Colon cancer Neg Hx        SOCIAL HISTORY:  Social History     Socioeconomic History    Marital status: /Civil Union     Spouse name: Not on file    Number of children: 2    Years of education: Not on file    Highest education level: Not on file   Occupational History    Occupation: employed     Comment: full-time   Tobacco Use    Smoking status: Never    Smokeless tobacco: Never   Vaping Use    Vaping Use: Never used   Substance and Sexual Activity    Alcohol use: Yes     Comment: socially    Drug use: Never    Sexual activity: Yes     Partners: Male     Birth control/protection: Male Sterilization   Other Topics Concern    Not on file   Social History Narrative    Always uses a seatbelt    Caffeine use    Feels safe at home    Lives with spouse    Recreational activities: walking     Social Determinants of Health     Financial Resource Strain: Not on file   Food Insecurity: Not on file   Transportation Needs: Not on file   Physical Activity: Not on file   Stress: Not on file   Social Connections: Not on file   Intimate Partner Violence: Not on file   Housing Stability: Not on file       Review of Systems   Constitutional:  Negative for chills and fever. HENT:  Negative for ear pain and sore throat. Eyes:  Negative for pain and visual disturbance. Respiratory:  Negative for cough and shortness of breath. Cardiovascular:  Negative for chest pain and palpitations. Gastrointestinal:  Negative for abdominal pain and vomiting. Genitourinary:  Negative for dysuria and hematuria. Musculoskeletal:  Negative for arthralgias and back pain. Skin:  Negative for color change and rash. Neurological:  Negative for seizures and syncope.    All other systems reviewed and are negative. Objective:  Vitals:    12/12/23 1527   BP: 144/92   BP Location: Left arm   Patient Position: Sitting   Cuff Size: Large   Pulse: 99   Resp: 18   SpO2: 99%   Weight: 115 kg (252 lb 12.8 oz)   Height: 5' 5" (1.651 m)     Body mass index is 42.07 kg/m². Physical Exam  Vitals and nursing note reviewed. Constitutional:       Appearance: Normal appearance. She is obese. HENT:      Head: Normocephalic and atraumatic. Cardiovascular:      Rate and Rhythm: Normal rate and regular rhythm. Heart sounds: Normal heart sounds. Pulmonary:      Effort: Pulmonary effort is normal.      Breath sounds: Normal breath sounds. Musculoskeletal:         General: Normal range of motion. Cervical back: Normal range of motion. Right lower leg: No edema. Left lower leg: No edema. Skin:     General: Skin is warm and dry. Neurological:      General: No focal deficit present. Mental Status: She is alert and oriented to person, place, and time. Mental status is at baseline. Psychiatric:         Mood and Affect: Mood normal.           RESULTS:  Hemoglobin A1C   Date/Time Value Ref Range Status   09/11/2023 02:18 PM 7.8 (H) Normal 4.0-5.6%; PreDiabetic 5.7-6.4%; Diabetic >=6.5%; Glycemic control for adults with diabetes <7.0% % Final   09/14/2017 04:20 PM 6.1  Final     Comment:       Performed at:  In Office  ,       Cholesterol   Date/Time Value Ref Range Status   10/21/2022 10:11  (H) See Comment mg/dL Final     Comment:     Cholesterol:         Pediatric <18 Years        Desirable          <170 mg/dL      Borderline High    170-199 mg/dL      High               >=200 mg/dL        Adult >=18 Years            Desirable        <200 mg/dL      Borderline High  200-239 mg/dL      High             >239 mg/dL       Triglycerides   Date/Time Value Ref Range Status   10/21/2022 10:11  See Comment mg/dL Final     Comment:     Triglyceride:     0-9Y <75mg/dL     10Y-17Y         <90 mg/dL       >=18Y     Normal          <150 mg/dL     Borderline High 150-199 mg/dL     High            200-499 mg/dL        Very High       >499 mg/dL    Specimen collection should occur prior to N-Acetylcysteine or Metamizole administration due to the potential for falsely depressed results. HDL, Direct   Date/Time Value Ref Range Status   10/21/2022 10:11 AM 59 >=50 mg/dL Final     Comment:     Specimen collection should occur prior to Metamizole administration due to the potential for falsley depressed results. LDL Calculated   Date/Time Value Ref Range Status   10/21/2022 10:11  (H) 0 - 100 mg/dL Final     Comment:     LDL Cholesterol:     Optimal           <100 mg/dl     Near Optimal      100-129 mg/dl     Above Optimal       Borderline High 130-159 mg/dl       High            160-189 mg/dl       Very High       >189 mg/dl         This screening LDL is a calculated result. It does not have the accuracy of the Direct Measured LDL in the monitoring of patients with hyperlipidemia and/or statin therapy. Direct Measure LDL (QAQ969) must be ordered separately in these patients. Hemoglobin   Date/Time Value Ref Range Status   06/28/2023 07:14 AM 10.6 (L) 11.5 - 15.4 g/dL Final     Hematocrit   Date/Time Value Ref Range Status   06/28/2023 07:14 AM 34.9 34.8 - 46.1 % Final     Platelets   Date/Time Value Ref Range Status   06/28/2023 07:14  149 - 390 Thousands/uL Final     TSH 3RD GENERATON   Date/Time Value Ref Range Status   10/21/2022 10:11 AM 3.688 0.450 - 4.500 uIU/mL Final     Comment:     The recommended reference ranges for TSH during pregnancy are as follows:   First trimester 0.1 to 2.5 uIU/mL   Second trimester  0.2 to 3.0 uIU/mL   Third trimester 0.3 to 3.0 uIU/m    Note: Normal ranges may not apply to patients who are transgender, non-binary, or whose legal sex, sex at birth, and gender identity differ.   Adult TSH (3rd generation) reference range follows the recommended guidelines of the American Thyroid Association, January, 2020. Free T4   Date/Time Value Ref Range Status   11/18/2019 09:01 AM 1.14 0.76 - 1.46 ng/dL Final     Comment:       Specimen collection should occur prior to Sulfasalazine administration due to the potential for falsely elevated results. Sodium   Date/Time Value Ref Range Status   06/28/2023 07:14  135 - 147 mmol/L Final     BUN   Date/Time Value Ref Range Status   06/28/2023 07:14 AM 14 5 - 25 mg/dL Final     Creatinine   Date/Time Value Ref Range Status   06/28/2023 07:14 AM 0.68 0.60 - 1.30 mg/dL Final     Comment:     Standardized to IDMS reference method      In chart    This note was created with voice recognition software. Phonic, grammatical and spelling errors may be present within the note as a result.

## 2024-03-28 NOTE — TELEPHONE ENCOUNTER
LALIT        ----- Message from [facility], [address] sent at 12/30/2019  1:39 PM EST -----  Please call   Pap normal, HPV negative thanks Detail Level: Generalized General Sunscreen Counseling: I recommended a broad spectrum sunscreen with a SPF of 30 or higher.  I explained that SPF 30 sunscreens block approximately 97 percent of the sun's harmful rays.  Sunscreens should be applied at least 15 minutes prior to expected sun exposure and then every 2 hours after that as long as sun exposure continues. If swimming or exercising sunscreen should be reapplied every 45 minutes to an hour after getting wet or sweating.  One ounce, or the equivalent of a shot glass full of sunscreen, is adequate to protect the skin not covered by a bathing suit. I also recommended a lip balm with a sunscreen as well. Sun protective clothing can be used in lieu of sunscreen but must be worn the entire time you are exposed to the sun's rays.

## 2024-04-02 LAB
LEFT EYE DIABETIC RETINOPATHY: NORMAL
RIGHT EYE DIABETIC RETINOPATHY: NORMAL
SEVERITY (EYE EXAM): NORMAL

## 2024-04-04 ENCOUNTER — RA CDI HCC (OUTPATIENT)
Dept: OTHER | Facility: HOSPITAL | Age: 49
End: 2024-04-04

## 2024-06-12 ENCOUNTER — ANNUAL EXAM (OUTPATIENT)
Dept: OBGYN CLINIC | Facility: CLINIC | Age: 49
End: 2024-06-12
Payer: COMMERCIAL

## 2024-06-12 VITALS
OXYGEN SATURATION: 98 % | WEIGHT: 261.8 LBS | HEIGHT: 65 IN | BODY MASS INDEX: 43.62 KG/M2 | HEART RATE: 101 BPM | SYSTOLIC BLOOD PRESSURE: 148 MMHG | DIASTOLIC BLOOD PRESSURE: 92 MMHG

## 2024-06-12 DIAGNOSIS — Z01.419 ENCOUNTER FOR GYNECOLOGICAL EXAMINATION: Primary | ICD-10-CM

## 2024-06-12 DIAGNOSIS — N95.2 VAGINAL ATROPHY: ICD-10-CM

## 2024-06-12 DIAGNOSIS — Z12.31 ENCOUNTER FOR SCREENING MAMMOGRAM FOR MALIGNANT NEOPLASM OF BREAST: ICD-10-CM

## 2024-06-12 PROBLEM — N92.0 MENORRHAGIA WITH REGULAR CYCLE: Status: RESOLVED | Noted: 2019-06-03 | Resolved: 2024-06-12

## 2024-06-12 PROCEDURE — S0612 ANNUAL GYNECOLOGICAL EXAMINA: HCPCS | Performed by: OBSTETRICS & GYNECOLOGY

## 2024-06-12 RX ORDER — ESTRADIOL 0.1 MG/G
0.5 CREAM VAGINAL 2 TIMES WEEKLY
Qty: 42.5 G | Refills: 3 | Status: SHIPPED | OUTPATIENT
Start: 2024-06-13

## 2024-06-12 NOTE — PROGRESS NOTES
"Ambulatory Visit  Name: Carolina Allison      : 1975      MRN: [id number]  Encounter Provider: Kailey Aguirre MD  Encounter Date: 2024   Encounter department: [facility] [place]    Assessment & Plan   1. Encounter for gynecological examination  Assessment & Plan:  Pap/HPV not indicated  Mammogram ordered    Menopausal symptoms discussed    Encourage healthy diet, exercise, Calcium 1200mg per day and at least 800 iu Vitamin D daily.    2. Encounter for screening mammogram for malignant neoplasm of breast  -     Mammo screening bilateral w 3d & cad; Future; Expected date: 2024  3. Vaginal atrophy  -     estradiol (ESTRACE) 0.1 mg/g vaginal cream; Insert 0.5 g into the vagina 2 (two) times a week      History of Present Illness   Patient presents for a routine annual visit  Hysterectpmy, denies VB  Last Pap Smear-  neg/neg  Mammogram-  2023 BIRADS 1- neg    Non smoker  Social drinker  Currently sexually active  MGM and P aunt breast cancer  PGM and P aunt ovarian cancer  No family history of uterine,, cervical cancer.   No concerns/questions for today's visit       Carolina Allison is a 49 y.o. female who presents for annual visit    Review of Systems   Constitutional:  Negative for activity change, appetite change, chills, fatigue and fever.   HENT:  Negative for rhinorrhea, sneezing and sore throat.    Eyes:  Negative for visual disturbance.   Respiratory:  Negative for cough, shortness of breath and wheezing.    Cardiovascular:  Negative for chest pain, palpitations and leg swelling.   Gastrointestinal:  Negative for abdominal distention, constipation, diarrhea, nausea and vomiting.   Genitourinary:  Negative for difficulty urinating.   Neurological:  Negative for syncope and light-headedness.       Objective     /92 (BP Location: Left arm, Patient Position: Sitting, Cuff Size: Large)   Pulse 101   Ht 5' 5\" (1.651 m)   Wt 119 kg (261 lb " 12.8 oz)   LMP 09/01/2023   SpO2 98%   BMI 43.57 kg/m²     Physical Exam  Constitutional:       General: She is not in acute distress.     Appearance: Normal appearance. She is well-developed. She is not diaphoretic.   Chest:   Breasts:     Breasts are symmetrical.      Right: No inverted nipple, mass, nipple discharge, skin change or tenderness.      Left: No inverted nipple, mass, nipple discharge, skin change or tenderness.   Abdominal:      Palpations: Abdomen is soft. Abdomen is not rigid.      Tenderness: There is no abdominal tenderness. There is no guarding or rebound.      Hernia: There is no hernia in the left inguinal area or right inguinal area.   Genitourinary:     Pubic Area: No rash.       Labia:         Right: No rash, tenderness, lesion or injury.         Left: No rash, tenderness, lesion or injury.       Urethra: No prolapse, urethral pain, urethral swelling or urethral lesion.      Vagina: No vaginal discharge, erythema, tenderness, bleeding or prolapsed vaginal walls.      Adnexa:         Right: No mass, tenderness or fullness.          Left: No mass, tenderness or fullness.        Comments: Urethral meatus: no lesions, non tender  Urethra: non tender    Vaginal mucosa atrophic  Skin:     General: Skin is warm and dry.      Coloration: Skin is not pale.      Findings: No erythema or rash.       Administrative Statements

## 2024-06-14 PROBLEM — R89.9 ABNORMAL THYROID BIOPSY: Status: RESOLVED | Noted: 2019-09-12 | Resolved: 2024-06-14

## 2024-06-14 PROBLEM — Z01.419 ENCOUNTER FOR GYNECOLOGICAL EXAMINATION: Status: ACTIVE | Noted: 2024-06-14

## 2024-06-14 PROBLEM — Z12.31 ENCOUNTER FOR SCREENING MAMMOGRAM FOR MALIGNANT NEOPLASM OF BREAST: Status: RESOLVED | Noted: 2024-06-14 | Resolved: 2024-06-14

## 2024-06-14 PROBLEM — Z12.31 ENCOUNTER FOR SCREENING MAMMOGRAM FOR MALIGNANT NEOPLASM OF BREAST: Status: ACTIVE | Noted: 2024-06-14

## 2024-06-14 NOTE — ASSESSMENT & PLAN NOTE
Pap/HPV not indicated  Mammogram ordered    Menopausal symptoms discussed    Encourage healthy diet, exercise, Calcium 1200mg per day and at least 800 iu Vitamin D daily.

## 2024-07-09 ENCOUNTER — RA CDI HCC (OUTPATIENT)
Dept: OTHER | Facility: HOSPITAL | Age: 49
End: 2024-07-09

## 2024-07-14 PROBLEM — Z01.419 ENCOUNTER FOR GYNECOLOGICAL EXAMINATION: Status: RESOLVED | Noted: 2024-06-14 | Resolved: 2024-07-14

## 2024-08-06 ENCOUNTER — TELEPHONE (OUTPATIENT)
Dept: INTERNAL MEDICINE CLINIC | Facility: CLINIC | Age: 49
End: 2024-08-06

## 2024-08-09 ENCOUNTER — VBI (OUTPATIENT)
Dept: ADMINISTRATIVE | Facility: OTHER | Age: 49
End: 2024-08-09

## 2024-08-09 ENCOUNTER — RA CDI HCC (OUTPATIENT)
Dept: OTHER | Facility: HOSPITAL | Age: 49
End: 2024-08-09

## 2024-08-09 NOTE — PROGRESS NOTES
HCC coding opportunities          Chart Reviewed number of suggestions sent to Provider: 1   E66.01    Patients Insurance        Commercial Insurance: [employer]        Nursing Note by Sergei Landrum RN at 06/02/17 10:34 AM     Author:  Sergei Landrum RN Service:  (none) Author Type:  Registered Nurse     Filed:  06/02/17 10:35 AM Encounter Date:  6/2/2017 Status:  Signed     :  Sergei Landrum RN (Registered Nurse)            Patient taken to room in stable condition. Name and birthdate verified by patient. Letty Beltran was offered treatment for pain control:[KH1.1T] Yes.   Patient refused comfort measures to reduce pain.[KH1.1M]    Last visit with Manjit . was on 03/21/2005 at  2:30 PM in [address] visit with [facility] was on 01/14/2016 at 12:55 PM in [address] [place] done based on reference date of today 6/2/17[KH1.1T]            Revision History        User Key Date/Time User Provider Type Action    > KH1.1 06/02/17 10:35 AM Sergei Landrum RN Registered Nurse Sign    M - Manual, T - Template

## 2024-08-09 NOTE — TELEPHONE ENCOUNTER
08/09/24 10:23 AM     Chart reviewed for CRC: Colonoscopy was/were not submitted to the patient's insurance.     Fariha Saab MA   [facility]

## 2024-08-12 ENCOUNTER — TELEPHONE (OUTPATIENT)
Age: 49
End: 2024-08-12

## 2024-08-12 DIAGNOSIS — R41.89 COGNITIVE DEFICITS: Primary | ICD-10-CM

## 2024-08-12 DIAGNOSIS — R53.1 RIGHT SIDED WEAKNESS: ICD-10-CM

## 2024-08-12 NOTE — TELEPHONE ENCOUNTER
[facility] [phone]   FAx: [phone]    Requesting AMB referral to [facility]   ( [address] [place] [address] location)    Occupational therapy, physical therapy - Right sided weakness    2.  Speech therapy- Cognitive deficit     Please fax order to [phone]

## 2024-08-16 ENCOUNTER — OFFICE VISIT (OUTPATIENT)
Dept: INTERNAL MEDICINE CLINIC | Facility: CLINIC | Age: 49
End: 2024-08-16
Payer: COMMERCIAL

## 2024-08-16 VITALS
HEIGHT: 65 IN | RESPIRATION RATE: 17 BRPM | HEART RATE: 112 BPM | DIASTOLIC BLOOD PRESSURE: 82 MMHG | BODY MASS INDEX: 43.32 KG/M2 | SYSTOLIC BLOOD PRESSURE: 126 MMHG | OXYGEN SATURATION: 97 % | WEIGHT: 260 LBS

## 2024-08-16 DIAGNOSIS — R53.1 RIGHT SIDED WEAKNESS: ICD-10-CM

## 2024-08-16 DIAGNOSIS — E11.9 TYPE 2 DIABETES MELLITUS WITHOUT COMPLICATION, WITHOUT LONG-TERM CURRENT USE OF INSULIN (HCC): Primary | ICD-10-CM

## 2024-08-16 DIAGNOSIS — E78.1 HYPERTRIGLYCERIDEMIA: ICD-10-CM

## 2024-08-16 DIAGNOSIS — G51.0 BELL'S PALSY: ICD-10-CM

## 2024-08-16 DIAGNOSIS — E06.3 HASHIMOTO'S THYROIDITIS: ICD-10-CM

## 2024-08-16 PROBLEM — E11.65 TYPE 2 DIABETES MELLITUS WITH HYPERGLYCEMIA, UNSPECIFIED WHETHER LONG TERM INSULIN USE (HCC): Status: RESOLVED | Noted: 2023-02-09 | Resolved: 2024-08-16

## 2024-08-16 PROBLEM — R29.810 FACIAL DROOP: Status: ACTIVE | Noted: 2024-08-03

## 2024-08-16 PROBLEM — D50.0 IRON DEFICIENCY ANEMIA DUE TO CHRONIC BLOOD LOSS: Status: RESOLVED | Noted: 2019-09-12 | Resolved: 2024-08-16

## 2024-08-16 PROCEDURE — 99214 OFFICE O/P EST MOD 30 MIN: CPT | Performed by: INTERNAL MEDICINE

## 2024-08-16 RX ORDER — ASPIRIN 81 MG/1
81 TABLET ORAL DAILY
COMMUNITY
Start: 2024-08-06 | End: 2025-08-06

## 2024-08-16 RX ORDER — PREDNISONE 10 MG/1
TABLET ORAL
COMMUNITY
Start: 2024-08-05

## 2024-08-16 RX ORDER — ATORVASTATIN CALCIUM 10 MG/1
10 TABLET, FILM COATED ORAL DAILY
COMMUNITY
Start: 2024-08-05 | End: 2024-09-04

## 2024-08-16 NOTE — ASSESSMENT & PLAN NOTE
Not controlled. Continue metformin. She does not want to use insulin. Will try mounjaro. Discussed side effects. Continue statin. Foot exam done. Eye exam done at [facility] in the spring.  Lab Results   Component Value Date    HGBA1C 8.1 (H) 08/02/2024

## 2024-08-16 NOTE — PROGRESS NOTES
Ambulatory Visit  Name: Carolina Allison      : 1975      MRN: [id number]  Encounter Provider: Yovani Rudolph MD  Encounter Date: 2024   Encounter department: [facility] INTERNAL MEDICINE [place]    Assessment & Plan     1. Type 2 diabetes mellitus without complication, without long-term current use of insulin (HCC)  Assessment & Plan:  Not controlled. Continue metformin. She does not want to use insulin. Will try mounjaro. Discussed side effects. Continue statin. Foot exam done. Eye exam done at [facility] in the spring.  Lab Results   Component Value Date    HGBA1C 8.1 (H) 2024     Orders:  -     Albumin / creatinine urine ratio; Future; Expected date: 2024  -     Comprehensive metabolic panel; Future; Expected date: 2024  -     Hemoglobin A1C; Future; Expected date: 2024  -     CBC and differential; Future; Expected date: 2024  -     Lipid Panel with Direct LDL reflex; Future; Expected date: 2024  -     TSH, 3rd generation with Free T4 reflex; Future; Expected date: 2024  -     tirzepatide 2.5 MG/0.5ML; Inject 0.5 mL (2.5 mg total) under the skin every 7 days  2. Bell's palsy  Assessment & Plan:  Recently admitted to [facility] with stroke like symptoms, was given a dose of tenecteplase and transitioned to the ICU. She had an MRI and MRA MRI did not show any CVA MRI did not show any significant stenosis.    The following day she was downgraded out of ICU on 8/3. She was seen by neurology and had a CT of the head and CT cervical spine. CT cervical spine was showing spondylosis.    Her facial droop was felt to be due to Bell's palsy her right upper and lower extremity weakness was felt to be likely contributed to by her spondylosis and sciatica.   Orders:  -     Ambulatory Referral to Neurology; Future  3. Right sided weakness  -     Ambulatory Referral to Neurology; Future  4. Hashimoto's thyroiditis  Assessment & Plan:  Continue current dose of  levothyroxine.   5. Hypertriglyceridemia  Assessment & Plan:  Continue statin.      Depression Screening and Follow-up Plan: Patient was screened for depression during today's encounter. They screened negative with a PHQ-2 score of 0.    History of Present Illness     Here for follow up.      Review of Systems   Constitutional:  Negative for chills and fever.   HENT:  Negative for ear pain and sore throat.    Eyes:  Negative for pain and visual disturbance.   Respiratory:  Negative for cough and shortness of breath.    Cardiovascular:  Negative for chest pain and palpitations.   Gastrointestinal:  Negative for abdominal pain and vomiting.   Genitourinary:  Negative for dysuria and hematuria.   Musculoskeletal:  Positive for gait problem. Negative for arthralgias and back pain.   Skin:  Negative for color change and rash.   Neurological:  Positive for facial asymmetry. Negative for seizures and syncope.   Psychiatric/Behavioral:  The patient is nervous/anxious.    All other systems reviewed and are negative.    Past Medical History   Past Medical History:   Diagnosis Date   • Anemia    • Anesthesia     Cardiac event   • Asthma    • Bell's palsy    • COVID-19 10/20/2020   • Diabetes mellitus (HCC)     borderline-diet controlled   • Diabetes mellitus during pregnancy    • Disease of thyroid gland    • Hashimoto's disease    • Migraine      Past Surgical History:   Procedure Laterality Date   •  SECTION     • HYSTEROSCOPY      with resection of intrauterine septum   • WI HYSTEROSCOPY BX ENDOMETRIUM&/POLYPC W/WO D&C N/A 2023    Procedure: DILATATION AND CURETTAGE (D&C) WITH HYSTEROSCOPY;  Surgeon: Dunia Sarmiento MD;  Location: [facility];  Service: Gynecology   • WI LAPS TOTAL HYSTERECT 250 GM/< W/RMVL TUBE/OVARY Bilateral 2023    Procedure: HYSTERECTOMY LAPAROSCOPIC TOTAL WITH BILATERAL SALPINGO-OOPHERECTOMY;  Surgeon: Kailey Aguirre MD;  Location: [facility];  Service: Gynecology   •  US GUIDED THYROID BIOPSY  6/13/2019   • US GUIDED THYROID BIOPSY  9/19/2019     Family History   Problem Relation Age of Onset   • Migraines Mother    • Heart disease Father         cardiac disorder   • Hyperlipidemia Father    • Hypertension Father    • Breast cancer Maternal Grandmother    • Cervical cancer Maternal Grandmother    • Ovarian cancer Paternal Grandmother    • Cancer Paternal Grandmother    • Cervical cancer Maternal Aunt    • Endometrial cancer Maternal Aunt 20   • Breast cancer Paternal Aunt    • Endometrial cancer Paternal Aunt 30   • Uterine cancer Paternal Aunt    • Ovarian cancer Paternal Aunt    • Colon cancer Neg Hx      Current Outpatient Medications on File Prior to Visit   Medication Sig Dispense Refill   • acetaminophen (TYLENOL) 325 mg tablet Take 2 tablets (650 mg total) by mouth every 6 (six) hours as needed for mild pain  0   • aspirin (ECOTRIN LOW STRENGTH) 81 mg EC tablet Take 81 mg by mouth daily     • atorvastatin (LIPITOR) 10 mg tablet Take 10 mg by mouth daily     • Blood Glucose Monitoring Suppl ([employer] Blood Glucose Meter) w/Device KIT Test blood sugar three times daily. 1 kit 2   • glucose blood test strip Test blood sugar three times daily. 200 strip 5   • ibuprofen (MOTRIN) 600 mg tablet Take 1 tablet (600 mg total) by mouth every 6 (six) hours as needed for moderate pain 30 tablet 0   • Lancets (Vericancan Unistik 2) MISC Test blood sugar three times daily. 200 each 5   • levothyroxine 100 mcg tablet Take one tab po every other day (alt with 112 mcg) 90 tablet 0   • levothyroxine 112 mcg tablet Take one tab po every other day (alt with 110 mcg) 90 tablet 0   • lisinopril (ZESTRIL) 5 mg tablet Take 1 tablet (5 mg total) by mouth daily 90 tablet 3   • metFORMIN (GLUCOPHAGE) 1000 MG tablet Take 1 tablet (1,000 mg total) by mouth 2 (two) times a day with meals 180 tablet 3   • predniSONE 10 mg tablet 4 tablets for 4d then 3 tablets for 4d then 2 tablets for 4d then 1 tablet for 4d    "  • estradiol (ESTRACE) 0.1 mg/g vaginal cream Insert 0.5 g into the vagina 2 (two) times a week (Patient not taking: Reported on 8/16/2024) 42.5 g 3   • [DISCONTINUED] Insulin Glargine Solostar (Lantus SoloStar) 100 UNIT/ML SOPN Inject 0.1 mL (10 Units total) under the skin daily at bedtime (Patient not taking: Reported on 8/16/2024) 3 mL 2   • [DISCONTINUED] Insulin Pen Needle (CareFine Pen Needles) 32G X 5 MM MISC For use with insulin injections daily. (Patient not taking: Reported on 8/16/2024) 100 each 5   • [DISCONTINUED] tirzepatide 2.5 MG/0.5ML Inject 0.5 mL (2.5 mg total) under the skin every 7 days (Patient not taking: Reported on 6/12/2024) 2 mL 0     No current facility-administered medications on file prior to visit.     Allergies   Allergen Reactions   • Iodinated Contrast Media Anaphylaxis     SOB, chest tightness, required epinephrine      Objective   /82 (BP Location: Left arm, Patient Position: Sitting, Cuff Size: Large)   Pulse (!) 112   Resp 17   Ht 5' 5\" (1.651 m)   Wt 118 kg (260 lb)   LMP 09/01/2023   SpO2 97%   BMI 43.27 kg/m²     Physical Exam  Vitals and nursing note reviewed.   Constitutional:       General: She is not in acute distress.     Appearance: She is well-developed.   HENT:      Head: Normocephalic and atraumatic.   Cardiovascular:      Rate and Rhythm: Normal rate and regular rhythm.      Pulses: no weak pulses.           Dorsalis pedis pulses are 2+ on the right side and 2+ on the left side.      Heart sounds: No murmur heard.  Pulmonary:      Effort: Pulmonary effort is normal. No respiratory distress.      Breath sounds: Normal breath sounds.   Abdominal:      Tenderness: There is no abdominal tenderness.   Musculoskeletal:         General: No swelling.      Cervical back: Neck supple.   Feet:      Right foot:      Skin integrity: No ulcer, skin breakdown, erythema, warmth, callus or dry skin.      Left foot:      Skin integrity: No ulcer, skin breakdown, " erythema, warmth, callus or dry skin.   Skin:     General: Skin is warm and dry.      Capillary Refill: Capillary refill takes less than 2 seconds.   Neurological:      Mental Status: She is alert and oriented to person, place, and time.      Cranial Nerves: Cranial nerve deficit present.      Motor: Weakness present.      Gait: Gait abnormal.   Psychiatric:         Mood and Affect: Mood normal.         I have spent a total time of 20 minutes in caring for this patient on the day of the visit/encounter including Patient and family education, Importance of tx compliance, Impressions, Documenting in the medical record, and Reviewing / ordering tests, medicine, procedures  .      Diabetic Foot Exam    Patient's shoes and socks removed.    Right Foot/Ankle   Right Foot Inspection  Skin Exam: skin normal and skin intact. No dry skin, no warmth, no callus, no erythema, no maceration, no abnormal color, no pre-ulcer, no ulcer and no callus.     Toe Exam: ROM and strength within normal limits.     Sensory   Monofilament testing: intact    Vascular  Capillary refills: < 3 seconds  The right DP pulse is 2+.     Right Toe  - Comprehensive Exam  Ecchymosis: none  Swelling: none       Left Foot/Ankle  Left Foot Inspection  Skin Exam: skin normal and skin intact. No dry skin, no warmth, no erythema, no maceration, normal color, no pre-ulcer, no ulcer and no callus.     Toe Exam: ROM and strength within normal limits.     Sensory   Monofilament testing: intact    Vascular  Capillary refills: < 3 seconds  The left DP pulse is 2+.     Left Toe  - Comprehensive Exam  Ecchymosis: none  Swelling: none       Assign Risk Category  No deformity present  No loss of protective sensation  No weak pulses  Risk: 0

## 2024-08-16 NOTE — ASSESSMENT & PLAN NOTE
[facility], was given a dose of tenecteplase and transitioned to the ICU. She had an MRI and MRA MRI did not show any CVA MRI did not show any significant stenosis.    The following day she was downgraded out of ICU on 8/3. She was seen by neurology and had a CT of the head and CT cervical spine. CT cervical spine was showing spondylosis.    Her facial droop was felt to be due to Bell's palsy her right upper and lower extremity weakness was felt to be likely contributed to by her spondylosis and sciatica.

## 2024-08-27 ENCOUNTER — TELEPHONE (OUTPATIENT)
Age: 49
End: 2024-08-27

## 2024-08-27 NOTE — TELEPHONE ENCOUNTER
Pt was called, states that she is feeling better today, states that she had a HA x2 days but has resolved since. States pasted out for a few seconds, denies hitting head. States was a hot day tried to stay hydrated but feels that she was dehydrated.

## 2024-08-27 NOTE — TELEPHONE ENCOUNTER
[facility]  called about a patient update. Patient passed out on 8/24 after sitting outside in sun. Intercepted fall. Slight abrasion left knee. Patient was still shaky and had headache when seen yesterday.

## 2024-09-03 NOTE — PROGRESS NOTES
Speech-Language Pathology Initial Evaluation    Today's date: 2024   Patient’s name: Carolina Allison  : 1975  MRN: [id number]  Safety measures: allergy to dye  Referring provider: Yovani Rudolph MD    Encounter Diagnosis     ICD-10-CM    1. Other symbolic dysfunctions  R48.8       2. Cognitive deficits  R41.89 Ambulatory Referral to Speech Therapy          Assessment:  Patient presents with mild cognitive-linguistic deficits c//b decreased processing speed, decreased concentration/attention, verbal fluency,  delayed recall of locations/information, and organization of thoughts. Patient self-reports headaches, difficulties with word-finding and multi-tasking. Patient was administered the RBANs. Patient scored Low Average in Delayed Memory section and Average in Immediate Memory, Attention, Visuospatial, and Language sections. Overall, patient scored Avg  in RBANs testing scores for ages 40-48. However, it should be noted patient had significant difficulties completing test and required additional processing speed to administer correct answers which is not patient PLOF.  Patient is currently motivated for OP ST services and would like to return to work. Patient would benefit from outpatient skilled Speech Therapy services to maximize cognitive-linguistic skills after acute CVA to return to PLOF. Will initate 2x week for 45 mins sessions to maximize acute healing phase of recovery. OP ST services will target increased functional recall, executive functioning (processing, problem-solving, thought organization, etc.),  increased verbal fluency, Interactive Metronome, word-finding & memory aids/strategies education, successful completion of daily tasks and structured tasks, follow directions within functional activities and unstructured tasks, support positive communication interactions with both familiar and unfamiliar listeners, promote safety and facilitate overall improved quality of  life.      Short-term goals: (to be achieved in 6-8 weeks)    Patient will be educated on word finding strategies (i.e., circumlocution) for improved generative naming and verbal expression skills.      Patient will facilitate planning by completing thought organization tasks (e.g., sequencing, deduction puzzles, etc.) with 80% accuracy to facilitate increased executive functioning, working memory, problem solving, and processing skills    Patient will complete auditory immediate and short term memory tasks to 80% accuracy to facilitate increased ability to retell narratives and recall information within functional living environment    To target mental manipulation and working memory, patient will participate in word finding activity (i.e., anagrams) with 80% accuracy    Patient will answer questions regarding story read aloud with 80% accuracy to facilitate improved auditory comprehension and recall    Patient will demonstrate divided/sustained/alternating/auditory attention tasks by responding to multiple tasks or details within tasks at the same time with min cues in a distracting environment with 80% accuracy    Patient will complete concrete and abstract categorization tasks to 80% accuracy to facilitate improved generative naming skills and working memory      Patient will be educated on the use of internal and external memory aids and compensatory strategies with 80% accuracy to facilitate increased recall of routine, personal information, and recent events,         Long Term Goals    Patient will demonstrate cognitive-communication skills consistent with age and education given use of compensatory strategies when needed to resume baseline activities and responsibilities in home, community, and work/school settings by discharge.     Patient will complete cognitive-linguistic therapy that addresses patient's specific deficits in processing speed, short-term working memory, attention to detail, monitoring,  "sequencing, and organization skills, with instruction, to alleviate effects of executive functioning disorder deficits by discharge.    Patient will complete higher-level expressive language tasks (e.g., word definitions, idioms, synonym/antonyms, etc) with 80% accuracy to improve functional communication skills by discharge.              Plan:  Patient would benefit from outpatient skilled Speech Therapy services: Cognitive-linguistic therapy, PT/OT IE, Neurology     Frequency: 2x weekly  Duration: 3 months    Intervention certification from: 9/4/2024  Intervention certification to: 12/4/2024      Subjective:  History of present illness: Patient is a 49 y.o. female who was referred to outpatient skilled Speech Therapy services for a cognitive-linguistic evaluation. Pt [facility] with complaint of right-sided facial weakness, right-sided upper and lower extremity weakness. She was felt to have convincing stroke symptoms and was given a dose of tenecteplase and transitioned to the ICU. She had an MRI and MRA MRI did not show any CVA MRI did not show any significant stenosis. Downgraded out of ICU on 8/3. CT of the head and CT cervical spine andCT cervical spine was showing spondylosis. She did not have any issues with speech and swallowing.     Patient reports PMH: Bell's palsy 2x (20 years ago) and resolved fully. Patient reports episode on 8/2/24 was ruled as a stroke. Patient reports since stroke facial droop resolved, however, some lingering sensation difficulties on R side in buccal and labial area. Patient reports since stroke difficulties with concentrating/attention, processing, and verbal fluency.    Patient's goal(s): \"I want to get back to close to normal and go back to work\"    Hearing: WFL  Vision: WFL for testing     Home environment/lifestyle: lives with    Highest level of education: Some college  Vocational status: dispatcher supervisor      Objective (testing):  The " Repeatable Battery for the Assessment of Neuropsychological Status (RBANS) is a brief, individually-administered assessment which measures attention, language, visuospatial/constructional abilities, and immediate & delayed memory. The RBANS is intended for use with adolescents to adults, ages 12 to 89 years. The following results were obtained during the administration of the assessment.    Form: A    Cognitive Domain/Subtest: Index Score: Percentile Rank: Classification:   IMMEDIATE MEMORY 90 25%ile Average        1. List Learning (23/40)        2. Story Memory (21/24)       VISUOSPATIAL/  CONSTRUCTIONAL 102 55%ile Average        3. Figure Copy (20/20)        4. Line Orientation (15/20)       LANGUAGE 110 75%ile Average        5. Picture Naming (10/10)        6. Semantic Fluency (27/40)       ATTENTION 91 27%ile Average        7. Digit Span (12/16)        8. Coding (35/89)       DELAYED MEMORY 88 21%ile Low Average        9. List Recall (4/10)        10. List Recognition (18/20)        11. Story Recall (11/12)        12. Figure Recall (16/20)         Sum of Index Scores:  481   Total Scale:  94   Percentile: 34%ile   Classification: Average       *Patient named 27 concrete category members (fruits and vegetables) in 60 sec (norm=15+). -- AVERAGE    Cognitive Checklist:  *Patient indicated that she is experiencing difficulties in the following areas:    Memory: Remembering what people have told you    Attention: Dividing your attention (i.e., multi-tasking) and Losing your train of thought    Processing: Processing new information     Executive Functions: Organizing your thoughts    Communication: Word finding in conversation and Expressing thoughts and ideas fluently    Visual: Change in handwriting (i.e., sloppier) and New onset motion sickness in car    Emotional: Easily agitated or irritable    Increased Sensitivities to: Smell and Crowds or busy environments, additionally patient reports headaches        Visit  Tracking:  POC expires Unit limit Auth Expiration date PT/OT + Visit Limit?   12/4/24 No cog tx codes pending 52 PT, OT, ST combined                           Visit/Unit Tracking  AUTH Status:  Date 9/4  IE              yes Used 1               Remaining                    Intervention Comments:  45 mins of standard cog testing, 60 mins of scoring, write-up

## 2024-09-04 ENCOUNTER — EVALUATION (OUTPATIENT)
Age: 49
End: 2024-09-04
Payer: COMMERCIAL

## 2024-09-04 DIAGNOSIS — Z91.81 AT RISK FOR FALLS: ICD-10-CM

## 2024-09-04 DIAGNOSIS — R41.89 COGNITIVE DEFICITS: ICD-10-CM

## 2024-09-04 DIAGNOSIS — R53.1 RIGHT SIDED WEAKNESS: Primary | ICD-10-CM

## 2024-09-04 DIAGNOSIS — Z74.09 IMPAIRED FUNCTIONAL MOBILITY, BALANCE, GAIT, AND ENDURANCE: ICD-10-CM

## 2024-09-04 DIAGNOSIS — R48.8 OTHER SYMBOLIC DYSFUNCTIONS: Primary | ICD-10-CM

## 2024-09-04 PROCEDURE — 96125 COGNITIVE TEST BY HC PRO: CPT

## 2024-09-04 PROCEDURE — 97162 PT EVAL MOD COMPLEX 30 MIN: CPT

## 2024-09-04 PROCEDURE — 97530 THERAPEUTIC ACTIVITIES: CPT

## 2024-09-04 NOTE — PROGRESS NOTES
PT Evaluation        POC expires Unit limit Auth Expiration date PT/OT + Visit Limit?   24  pend 52 PCY combined                           Visit/Unit Tracking  AUTH Status: pend Date IE -               52 PCY combined Used 1+1               Remaining  50                 Today's date: 2024  Patient name: Carolina Allison  : 1975  MRN: [id number]  Referring provider: Yovani Rudolph MD  Dx:   Encounter Diagnosis     ICD-10-CM    1. Right sided weakness  R53.1 Ambulatory Referral to Physical Therapy      2. Impaired functional mobility, balance, gait, and endurance  Z74.09       3. At risk for falls  Z91.81           Assessment  Assessment details: Patient is a 49 y.o. Female who presents to skilled outpatient PT with impaired muscle strength, impaired functional strength, impaired balance, impaired righting reactions, impaired endurance, impaired functional mobility, impaired ability to perform ADLs. Co-morbidities that impact her care include: Hashimoto's, DM2, Asthma. She is a fall risk as noted by her score on the TUG, Gonzales, FGA. She is below age-matched norms for the 5xSTS. Significant time was spent on education about stroke mechanisms, stroke recovery, neuroplasticity, expectations, goals, outcome measures, other testing, treatments, simple HEP. She verbalized her understanding and was able to ask questions and she was given answers to her satisfaction. She is functioning below her PLOF and this has negatively impacted her quality of life. She can benefit from skilled PT interventions to maximize her function and promote the highest recovery possible.    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    Outcome Measures Initial Eval       5xSTS 16.95 sec with LUE support     TUG 19.19 sec with SPC      10 meter defer     MENJIVAR 40/56     FGA 16/30     6MWT defer     ABC 56.25%         Impairments: Abnormal gait, Abnormal muscle tone, Activity intolerance, Impaired balance, Impaired physical strength, Lacks appropriate HEP, Poor posture, Poor body mechanics, Safety issue, Abnormal movement, Abnormal muscle firing  Understanding of Dx/Px/POC: Good  Prognosis: Good      Patient verbalized understanding of POC.      Please contact me if you have any questions or recommendations. Thank you for the referral and the opportunity to share in Carolina SONI Allison's care.      Plan  Patient would benefit from: Skilled PT  Planned modality interventions: Biofeedback, Cryotherapy, TENS, Thermotherapy  Planned therapy interventions: Abdominal trunk stabilization, ADL training, Balance, Balance/WB training, Breathing training, Body mechanics training, Coordination, Functional ROM exercises, Gait training, HEP, Joint Mobilization, Manual Therapy, Mcneil taping, Motor coordination training, Neuromuscular re-education, Patient education, Postural training, Strengthening, Stretching, Therapeutic activities, Therapeutic exercises, Therapeutic training, Transfer training, Activity modification, Work reintegration  Frequency: 1-3x/wk  Plan of Care beginning date: 9/4/24  Plan of Care expiration date: 3 months - 12/4/2024  Treatment plan discussed with: Patient      Goals  Short Term Goals (4-6 weeks):    - Patient will improve time on TUG by 2.9 seconds to facilitate improved safety in all ambulation  - Patient will improve scoring on FGA by 4 points to progress safety  - Patient will be independent in basic HEP 2-3 weeks  - Patient will improve 5xSTS score by 2.3 seconds to promote improved LE functional strength needed for ADLs  - Patient will complete 6MWT  - Patient will complete 10mWT    Long Term Goals (8-12 weeks):  - Patient will be independent in a comprehensive home exercise program  - Patient will improve gait speed by 0.18  m/s to improve safety with community ambulation with least restrictive assistive device  - Patient will improve MENJIVAR by 6 points to facilitate return to safe independent ambulation  - Patient will improve scoring on FGA by 4 points to progress safety with dynamic tasks  - Patient will be able to demonstrate HT in gait without veering  - Patient will improve 6 Minute Walk Test score by 190 feet to promote improved cardiovascular endurance  - Patient will report 50% reduction in near falls in order to improve safety with functional tasks and reduce his risk for falls  - Patient will report going on walks at least 3 days per week to promote independence and improved cardiovascular endurance  - Patient will be able to ascend/descend stairs reciprocally without UE assist to promote independence and safety with ADLs  - Patient will report 50% reduction in near falls when ambulating on uneven terrain      Cut off score    All date taken from APTA Neuro Section or Rehab Measures      Menjivar:  Gretel et al., 2018  MDC: 2.7 pts    Amita et al., 2011  Cut-off score: 45/56    Chronic CVA  < 44/56 high risk for falls (Gretel et al., 2018)  < 47.5/56 slow walker status (Peter et al., 2011) 5xSTS: Guillermo 2010  MDC: 2.3 sec  Age Norms:  60-69: 11.1 sec  70-79: 12.6 sec  80-89: 14.8 sec    Sj 2010, Chronic Stroke  Chronic CVA: 12 sec   TUG  Mindy jalloh alMahnaz, 2005  MDC: 2.9 sec    Cut off score:  >13.5 sec community dwelling adults  >32.2 frail elderly  <20 I for basic transfers  >30 dependent on transfers 10 Meter Walk Test:   Nils et al., 2006  Small meaningful change: 0.06 m/s  Substantial meaningful change: 0.14 m/s  MCID: 0.16 m/s    < 0.4 m/s household ambulators  0.4 - 0.8 m/s limited community ambulators  > 0.8 m/s community ambulators   FGA: nAna et al., 2010  MCID: 4.2 pts  Geriatrics/community < 22/30 fall risk  Geriatrics/community < 20/30 unexplained falls DGI  MDC: vestibular - 4 pts  MDC:  geriatric/community - 3 pts  Falls risk <19/24   6 Minute Walk Test  Nils et al., 2006  MDC: 60.98 m (200.01 ft)    Lamonte, Jarrett, & Gerda, 2012  MCID: 34.4 m    Age Norms  60-69: M - 1876 ft   F - 1765 ft  70-79: M - 1729 ft   F - 1545 ft  80-89: M - 1368 ft   F - 1286 ft Modified Kristen  0: No increase in tone  1: Catch and release or min resistance at end range  1+: Catch f/b min resistance throughout remainder (< half ROM)  2: Easily moved, but more marked tone throughout most ROM  3: Significant tone, PROM difficult  4: Rigid   MiniBest: Kaykay et al., 2013  CVA < 17.5 fall risk Pass (Acute CVA)  MDC: 1.8 points (acute), 3.2 points (chronic)         Subjective    History of Present Illness  Mechanism of injury: About 1 month ago, she started to have a facial droop and tingling. She started having trouble with cognition and speech. She went to the hospital and had right-sided weakness/numbness/tingling. She had home PT/OT.    Primary AD: SPC when not at home. Uses furniture at home to help with support.  Assist level at home: independent  WC usage: no  PLOF: independent    Patient goal: Get as close to baseline as possible    Pain  Current pain ratin-5/10  At best pain ratin/10  At worst pain ratin/10  Location: front of head (headache)  Aggravating factors: bright lights, busy environments    Social Support  Steps to enter house: 2-3 no railing  Stairs in house: FF with a railing   Lives in: 2 story home  Lives with: spouse, daughters (away at college, home in the summer)    Employment status:  (Not currently at work)  Hand dominance: R    Treatments  Previous treatment: Home PT/OT  Current treatment: starting PT  Diagnostic Testing: MRI    Objective     Vitals  - HR: 103  - BP: 152/84 mmHg, RUE, seated, manual  - SPO2: 97%    HR Max  - 207 - (0.7x103)  = 135    LE MMT  - R Hip Flexion: 3+/5  - L Hip Flexion: 4-/5  - R Hip Extension: 3+/5 - L Hip Extension: 4-/5  - R Hip Abduction:  3+/5 - L Hip abduction: 3+/5  - R Hip adduction: 3+/5 - L Hip adduction: 4-/5  - R Knee Extension: 3+/5 - L Knee Extension: 4-/5  - R Knee Flexion: 3+/5 - L Knee Flexion: 4-/5  - R Ankle DF: 3+/5  - L Ankle DF: 4-/5  - R Ankle PF: 3+/5  - L Ankle PF: 4-/5    Sensation  - Light touch: tingling in R foot (lateral aspect)  - Temperature: intact    Coordination  - Dysmetria: normal  - Dysdiadochokinesia: normal  - Alternating Toe Taps: normal    Modified Kristen  Unremarkable    Clonus  - L: No  - R: No    Vision  - Glasses: Yes  - Abnormalities prior to CVA: No  - Abnormalities post CVA: No    Neglect  - R sided: No  - L sided: No    Pusher's Syndrome  - R sided: No  - L sided: No    Gait  Decreased gait speed, decreased step length bilaterally, varying base of support      Outcome Measures Initial Eval  9/4     5xSTS 16.95 sec with LUE support     TUG 19.19 sec with SPC     10 meter defer     MENJIVAR 40/56     FGA 16/30     6MWT defer     ABC 56.25%          Precautions: fall risk, DM2, asthma, Hashimoto's disease, standard precautions  Past Medical History:   Diagnosis Date    Anemia     Anesthesia     Cardiac event    Asthma     Bell's palsy     COVID-19 10/20/2020    Diabetes mellitus (HCC)     borderline-diet controlled    Diabetes mellitus during pregnancy     Disease of thyroid gland     Hashimoto's disease     Migraine

## 2024-09-10 ENCOUNTER — OFFICE VISIT (OUTPATIENT)
Age: 49
End: 2024-09-10
Payer: COMMERCIAL

## 2024-09-10 ENCOUNTER — TELEPHONE (OUTPATIENT)
Dept: INTERNAL MEDICINE CLINIC | Facility: CLINIC | Age: 49
End: 2024-09-10

## 2024-09-10 DIAGNOSIS — R48.8 OTHER SYMBOLIC DYSFUNCTIONS: Primary | ICD-10-CM

## 2024-09-10 DIAGNOSIS — Z91.81 AT RISK FOR FALLS: ICD-10-CM

## 2024-09-10 DIAGNOSIS — G51.0 BELL'S PALSY: Primary | ICD-10-CM

## 2024-09-10 DIAGNOSIS — Z74.09 IMPAIRED FUNCTIONAL MOBILITY, BALANCE, GAIT, AND ENDURANCE: ICD-10-CM

## 2024-09-10 DIAGNOSIS — R53.1 RIGHT SIDED WEAKNESS: ICD-10-CM

## 2024-09-10 DIAGNOSIS — R41.89 COGNITIVE DEFICITS: ICD-10-CM

## 2024-09-10 DIAGNOSIS — R53.1 RIGHT SIDED WEAKNESS: Primary | ICD-10-CM

## 2024-09-10 PROCEDURE — 92507 TX SP LANG VOICE COMM INDIV: CPT

## 2024-09-10 PROCEDURE — 97530 THERAPEUTIC ACTIVITIES: CPT

## 2024-09-10 PROCEDURE — 97112 NEUROMUSCULAR REEDUCATION: CPT

## 2024-09-10 NOTE — PROGRESS NOTES
Daily Note     Today's date: 9/10/2024  Patient name: Carolina Allison  : 1975  MRN: [id number]  Referring provider: Yovani Rudolph MD  Dx:   Encounter Diagnosis     ICD-10-CM    1. Right sided weakness  R53.1       2. Impaired functional mobility, balance, gait, and endurance  Z74.09       3. At risk for falls  Z91.81                      Subjective: Pt shares she's been trying to walk more at home.      Objective: See treatment diary below    Pre-treatment BP: 124/78 seated L arm, manual reading    6MWT: 500ft no AD    NMR/HIGT                                                                   - Speed amb with 7.5# aw on RLE 2 x 250'  - Stair negotiation with 7.5# aw on RLE x25   Cue for alt sequence with 1HR  - Soeed STS no UE assist 2x10  - Speed amb with 10# tidal tank hold 3 laps x 80ft   PT applied OP to inc speed  - L HHA with speed fwd amb < > bwd amb 3 x 40' ea direction    TA   Basic HEP -  Access Code: [email or web address]  URL: [email or web address]  Date: 09/10/2024  Prepared by: Elizabeth Campos    Exercises  - Proper Sit to Stand Technique  - 1 x daily - 5 x weekly - 2 sets - 10 reps  - Standing March with Counter Support  - 1 x daily - 5 x weekly - 2 sets - 10 reps - 3 sec hold  - Standing Hip Abduction with Counter Support  - 1 x daily - 5 x weekly - 2 sets - 10 reps - 3 sec hold  - Standing Hip Extension with Counter Support  - 1 x daily - 5 x weekly - 2 sets - 10 reps - 3 sec hold    Assessment: Tolerated treatment well. Provided basic HEP due to generalized (R > L) weakness and reduced LE power generation; provided handout with explanation and demo. Pt verbalized understanding. Initiated HIGT/HIIT with good tolerance; will assess patient response NV. Patient demonstrated fatigue post treatment, exhibited good technique with therapeutic exercises, and would benefit from continued PT      Plan: Continue per plan of care.      POC expires Unit limit Auth Expiration date PT/OT + Visit  Limit?   12/4/24  pend 52 PCY combined                           Visit/Unit Tracking  AUTH Status: pend Date IE - 9/4 9/10             52 PCY combined Used 1+1 1+1              Remaining  50 48

## 2024-09-10 NOTE — PROGRESS NOTES
Daily Speech Treatment Note    Today's date: 9/10/2024   Patient’s name: Carolina Allison  : 1975  MRN: [id number]  Safety measures: acute cva  Referring provider: Yovani Rudolph MD    Encounter Diagnosis     ICD-10-CM    1. Other symbolic dysfunctions  R48.8       2. Cognitive deficits  R41.89           Visit Tracking:  POC expires Unit limit Auth Expiration date PT/OT + Visit Limit?   24 No cog tx codes 3/2/2025 52 PT, OT, ST combined                           Visit/Unit Tracking  AUTH Status:  Date   IE 9/10             yes Used 1 2              Remaining  7 6                 Subjective/Behavioral:  -Patient reported she still has difficulties with writing and requested OT script.      Objective/Assessment:  -Reviewed testing results and goals in plan care with patient. Patient is in agreement at this time. Patient reported her goal is to return to work. Patient reports her biggest concerns are with cognitive functioning after her CVA. Reviewed different tasks that can be completed at work in order to tailor OP ST tasks to aid with functional material for therapy. Patient reports difficulties with numbers    Short-term goals: (to be achieved in 6-8 weeks)    Patient will be educated on word finding strategies (i.e., circumlocution) for improved generative naming and verbal expression skills.      Patient will facilitate planning by completing thought organization tasks (e.g., sequencing, deduction puzzles, etc.) with 80% accuracy to facilitate increased executive functioning, working memory, problem solving, and processing skills    Patient will complete auditory immediate and short term memory tasks to 80% accuracy to facilitate increased ability to retell narratives and recall information within functional living environment    To target mental manipulation and working memory, patient will participate in word finding activity (i.e., anagrams) with 80% accuracy    Patient will answer questions  regarding story read aloud with 80% accuracy to facilitate improved auditory comprehension and recall    Patient will demonstrate divided/sustained/alternating/auditory attention tasks by responding to multiple tasks or details within tasks at the same time with min cues in a distracting environment with 80% accuracy    Patient will complete concrete and abstract categorization tasks to 80% accuracy to facilitate improved generative naming skills and working memory      Patient will be educated on the use of internal and external memory aids and compensatory strategies with 80% accuracy to facilitate increased recall of routine, personal information, and recent events,     To target utilization of internal and external memory strategies- patient was given a handout targeting different memory strategies. Strategies addressed remembering names, events, items, personal information, conversations, reading materials, memory aids (pill boxes, calendars, smart phone alarms/reminders), and auditory processing tips (repeating things aloud). Patient and clinician reviewed the different strategies. Reciprocal comprehension of strategies was noted.    Plan:  -Patient was provided with home exercises/activities to target goals in plan of care at the end of today's session.  -Continue with current plan of care.

## 2024-09-10 NOTE — TELEPHONE ENCOUNTER
----- Message from Yovani Rudolph MD sent at 9/10/2024 12:01 PM EDT -----  Regarding: FW: OT referral after CVA  Can we order PT  See message  ----- Message -----  From: BRAYDEN Rodriguez  Sent: 9/10/2024  12:00 PM EDT  To: Yovani Rudolph MD  Subject: OT referral after CVA                            Good afternoon Dr. Rudolph,       We have a mutual patient, Carolina Allison. I see her for cognitive therapy secondary to her acute CVA. She was interested scheduling an initial evaluation for OT at this clinic to assess her upper extremity strength from her CVA. Whenever you get the chance, please place a OT order at your earliest convenience.       Thank you in advance,     Cris Wilson CCC-SLP   [facility]   [address] [place] [address]   [phone]

## 2024-09-12 ENCOUNTER — OFFICE VISIT (OUTPATIENT)
Age: 49
End: 2024-09-12
Payer: COMMERCIAL

## 2024-09-12 DIAGNOSIS — R41.89 COGNITIVE DEFICITS: ICD-10-CM

## 2024-09-12 DIAGNOSIS — R53.1 RIGHT SIDED WEAKNESS: Primary | ICD-10-CM

## 2024-09-12 DIAGNOSIS — Z91.81 AT RISK FOR FALLS: ICD-10-CM

## 2024-09-12 DIAGNOSIS — Z74.09 IMPAIRED FUNCTIONAL MOBILITY, BALANCE, GAIT, AND ENDURANCE: ICD-10-CM

## 2024-09-12 DIAGNOSIS — R48.8 OTHER SYMBOLIC DYSFUNCTIONS: Primary | ICD-10-CM

## 2024-09-12 PROCEDURE — 92507 TX SP LANG VOICE COMM INDIV: CPT

## 2024-09-12 PROCEDURE — 97112 NEUROMUSCULAR REEDUCATION: CPT

## 2024-09-12 NOTE — PROGRESS NOTES
"Daily Speech Treatment Note    Today's date: 2024   Patient’s name: Carolina Allison  : 1975  MRN: [id number]  Safety measures: acute cva  Referring provider: Yovani Rudolph MD    Encounter Diagnosis     ICD-10-CM    1. Other symbolic dysfunctions  R48.8       2. Cognitive deficits  R41.89             Visit Tracking:  POC expires Unit limit Auth Expiration date PT/OT + Visit Limit?   24 No cog tx codes 3/2/2025 52 PT, OT, ST combined                           Visit/Unit Tracking  AUTH Status:  Date   IE 9/10 9/12            yes Used 1 2 3             Remaining  7 6 5                Subjective/Behavioral:    Patient reported no new concerns for today's session.      Objective/Assessment:    Short-term goals: (to be achieved in 6-8 weeks)    Patient will be educated on word finding strategies (i.e., circumlocution) for improved generative naming and verbal expression skills.      Patient will facilitate planning by completing thought organization tasks (e.g., sequencing, deduction puzzles, etc.) with 80% accuracy to facilitate increased executive functioning, working memory, problem solving, and processing skills    Patient engaged in the task \"SET\" where patient was given a deck of 12 cards with and and had to create a set of 3 cards. Each card has 4 features which varies in 3 different shapes/colors/numbers/shading. A set consists of 3 cards in which each of the cards' features are the same or different for each card. Task completed in 9/10 opp (90% acc) independently, increasing to 10/10 opp (100% acc) from min cueing. Patient benefited from written external memory aid of directions.      Patient will complete auditory immediate and short term memory tasks to 80% accuracy to facilitate increased ability to retell narratives and recall information within functional living environment    To target mental manipulation and working memory, patient will participate in word finding activity (i.e., " anagrams) with 80% accuracy    Anagrams: Task completed in 28/30 opp (93% acc) independently, increasing to 30/30 opp (% acc) from min cueing. Patient benefited from visual cues      Patient will answer questions regarding story read aloud with 80% accuracy to facilitate improved auditory comprehension and recall    Patient will demonstrate divided/sustained/alternating/auditory attention tasks by responding to multiple tasks or details within tasks at the same time with min cues in a distracting environment with 80% accuracy    Patient will complete concrete and abstract categorization tasks to 80% accuracy to facilitate improved generative naming skills and working memory      Patient will be educated on the use of internal and external memory aids and compensatory strategies with 80% accuracy to facilitate increased recall of routine, personal information, and recent events,       Plan:  -Patient was provided with home exercises/activities to target goals in plan of care at the end of today's session.  -Continue with current plan of care.

## 2024-09-12 NOTE — PROGRESS NOTES
"Daily Note     Today's date: 2024  Patient name: Carolina Allison  : 1975  MRN: [id number]  Referring provider: Yovani Rudolph MD  Dx:   Encounter Diagnosis     ICD-10-CM    1. Right sided weakness  R53.1       2. Impaired functional mobility, balance, gait, and endurance  Z74.09       3. At risk for falls  Z91.81           Start Time: 930  Stop Time: 1015  Total time in clinic (min): 45 minutes    Subjective: Pt shares she's has been busy at home and she is trying to be more active.      Objective: See treatment diary below    NMR/HIGT                                                                   Speed amb with 7.5# aw on ankles bilaterally 2 x 250'  Stair negotiation (24) x1   Cue for alt sequence with 1HR  Forward/backward walking with 7.5# tidal tank 50'x2 each  Lateral walking with 7.5# weights on ankles bilaterally 50'x2 each way  Eccentric lowering on 6\" step with 2 UE support x12  Step throughs on 6\" step with 2 UE support and  10# weight on RLE 2x20  Walking outside on the sidewalk 300'x1 cues for speed  Semi-tandem amb 50'x4  Walking with cues for speed through busy environments and on different surfaces 300'x1      Assessment: Tolerated treatment well. She continues to walk cautiously to avoid a loss of balance. She does have intermittent R foot drag, but this is mostly when turning or walking more slowly. This can cause a fall, but she seems to be aware of this and she can self-correct. She has some difficulty with eccentric control when on the stairs. Patient demonstrated fatigue post treatment, exhibited good technique with therapeutic exercises, and would benefit from continued PT.      Plan: Continue per plan of care.      POC expires Unit limit Auth Expiration date PT/OT + Visit Limit?   24  pend 52 PCY combined                           Visit/Unit Tracking  AUTH Status: pend Date IE - 9/4 9/10 9/12            52 PCY combined Used 1+1 1+1 1+1             Remaining  50 48 46 "

## 2024-09-17 ENCOUNTER — OFFICE VISIT (OUTPATIENT)
Age: 49
End: 2024-09-17
Payer: COMMERCIAL

## 2024-09-17 DIAGNOSIS — E11.9 TYPE 2 DIABETES MELLITUS WITHOUT COMPLICATION, WITHOUT LONG-TERM CURRENT USE OF INSULIN (HCC): ICD-10-CM

## 2024-09-17 DIAGNOSIS — I63.9 CEREBROVASCULAR ACCIDENT (CVA), UNSPECIFIED MECHANISM (HCC): ICD-10-CM

## 2024-09-17 DIAGNOSIS — R48.8 OTHER SYMBOLIC DYSFUNCTIONS: Primary | ICD-10-CM

## 2024-09-17 DIAGNOSIS — R41.89 COGNITIVE DEFICITS: ICD-10-CM

## 2024-09-17 DIAGNOSIS — Z91.81 AT RISK FOR FALLS: ICD-10-CM

## 2024-09-17 DIAGNOSIS — Z74.09 IMPAIRED FUNCTIONAL MOBILITY, BALANCE, GAIT, AND ENDURANCE: ICD-10-CM

## 2024-09-17 DIAGNOSIS — R53.1 RIGHT SIDED WEAKNESS: Primary | ICD-10-CM

## 2024-09-17 PROCEDURE — 92507 TX SP LANG VOICE COMM INDIV: CPT

## 2024-09-17 PROCEDURE — 97112 NEUROMUSCULAR REEDUCATION: CPT

## 2024-09-17 NOTE — PROGRESS NOTES
"Daily Note     Today's date: 2024  Patient name: Carolina Allison  : 1975  MRN: [id number]  Referring provider: Yovani Rudolph MD  Dx:   Encounter Diagnosis     ICD-10-CM    1. Right sided weakness  R53.1       2. Impaired functional mobility, balance, gait, and endurance  Z74.09       3. At risk for falls  Z91.81         Start Time: 1145  Stop Time: 1230  Total time in clinic (min): 45 minutes    Subjective: Pt shares she is no longer using the cane and she has been doing well.      Objective: See treatment diary below    NMR/HIGT                                                                   - Amb on treadmill at 1.5 mph to warm up, up to 3.5 mph at max, 2 mph to cool down (6 mins total) to promote increased speed  - Tandem stance on foam 30\"x2 each way  - Sit to stand with 10# tidal tank 2x15  - High knee march with 10# tidal tank 4 laps in // bars  - Semi-tandem amb 4 laps in // bars  - Lateral walking on foam 4 laps with 5# tidal tank in // bars  - Resisted walking with red band 200'x2  - Sit to stand with med ball slam x12  - Timed walking 120' laps (33 sec, 31 sec, 28 sec, 27 sec, 26 sec)      Assessment: Tolerated treatment well. Walking speed has improved and she demonstrates less cautious behavior. She does have intermittent R foot catch, but this is self-corrected and more present when she is fatigued. Continue to improve walking speed as able to promote BDNF release and improve dynamic balance/functional mobility. She has some difficulty with eccentric control when on the stairs. Patient demonstrated fatigue post treatment, exhibited good technique with therapeutic exercises, and would benefit from continued PT.      Plan: Continue per plan of care.      POC expires Unit limit Auth Expiration date PT/OT + Visit Limit?   24  pend 52 PCY combined                           Visit/Unit Tracking  AUTH Status: pend Date IE - 9/4 9/10 9/12 9/17           52 PCY combined Used 1+1 1+1 1+1 " 1+1            Remaining  50 48 46 44

## 2024-09-17 NOTE — PROGRESS NOTES
Daily Speech Treatment Note    Today's date: 2024   Patient’s name: Carolina Allison  : 1975  MRN: [id number]  Safety measures: acute cva  Referring provider: Yovani Rudolph MD    Encounter Diagnosis     ICD-10-CM    1. Other symbolic dysfunctions  R48.8       2. Cognitive deficits  R41.89       3. Cerebrovascular accident (CVA), unspecified mechanism (HCC)  I63.9               Visit Tracking:  POC expires Unit limit Auth Expiration date PT/OT + Visit Limit?   24 No cog tx codes 3/2/2025 52 PT, OT, ST combined                           Visit/Unit Tracking  AUTH Status:  Date   IE 9/10 9/12 9/17           yes Used 1 2 3 4            Remaining  7 6 5 4               Subjective/Behavioral:    Patient reported no new concerns for today's session.      Objective/Assessment:    Short-term goals: (to be achieved in 6-8 weeks)    Patient will be educated on word finding strategies (i.e., circumlocution) for improved generative naming and verbal expression skills.      Patient will facilitate planning by completing thought organization tasks (e.g., sequencing, deduction puzzles, etc.) with 80% accuracy to facilitate increased executive functioning, working memory, problem solving, and processing skills    Math coding task where patient was given a chain of different symbols associated with different math equations. Noted 2 errors in duration of task with auditory stimuli. Task took 15 mins to complete. Patient was able to self-identify errors with min visual cues.    Compass following direction: patient was given a grid and different directions to follow. At the end of following each direction, patient should have created a word. Task completed with 0 errors and patient was able to independently utilize compensatory strategies such as utilizing her finger to keep track of her place. Task was completed with door open and grocery store noise stimulation as a distractor. Task completed in a timely  manner.          Patient will complete auditory immediate and short term memory tasks to 80% accuracy to facilitate increased ability to retell narratives and recall information within functional living environment      Patient was given scrambled 4-5 words verbally and had to rearrange words to generate a sentence. Task completed in 12/14 opp (82% acc) independently, increasing to 14/14 opp (100% acc) from min cueing. Patient benefited from verbal repetition.        To target mental manipulation and working memory, patient will participate in word finding activity (i.e., anagrams) with 80% accuracy          Patient will answer questions regarding story read aloud with 80% accuracy to facilitate improved auditory comprehension and recall    Patient will demonstrate divided/sustained/alternating/auditory attention tasks by responding to multiple tasks or details within tasks at the same time with min cues in a distracting environment with 80% accuracy    Patient will complete concrete and abstract categorization tasks to 80% accuracy to facilitate improved generative naming skills and working memory      Patient will be educated on the use of internal and external memory aids and compensatory strategies with 80% accuracy to facilitate increased recall of routine, personal information, and recent events,       Plan:  -Patient was provided with home exercises/activities to target goals in plan of care at the end of today's session.  -Continue with current plan of care.

## 2024-09-19 ENCOUNTER — OFFICE VISIT (OUTPATIENT)
Age: 49
End: 2024-09-19
Payer: COMMERCIAL

## 2024-09-19 DIAGNOSIS — I63.9 CEREBROVASCULAR ACCIDENT (CVA), UNSPECIFIED MECHANISM (HCC): ICD-10-CM

## 2024-09-19 DIAGNOSIS — Z91.81 AT RISK FOR FALLS: ICD-10-CM

## 2024-09-19 DIAGNOSIS — R48.8 OTHER SYMBOLIC DYSFUNCTIONS: Primary | ICD-10-CM

## 2024-09-19 DIAGNOSIS — R41.89 COGNITIVE DEFICITS: ICD-10-CM

## 2024-09-19 DIAGNOSIS — Z74.09 IMPAIRED FUNCTIONAL MOBILITY, BALANCE, GAIT, AND ENDURANCE: ICD-10-CM

## 2024-09-19 DIAGNOSIS — R53.1 RIGHT SIDED WEAKNESS: Primary | ICD-10-CM

## 2024-09-19 PROCEDURE — 92507 TX SP LANG VOICE COMM INDIV: CPT

## 2024-09-19 PROCEDURE — 97112 NEUROMUSCULAR REEDUCATION: CPT

## 2024-09-19 PROCEDURE — 97110 THERAPEUTIC EXERCISES: CPT

## 2024-09-19 NOTE — PROGRESS NOTES
Daily Speech Treatment Note    Today's date: 2024   Patient’s name: Carolina Allison  : 1975  MRN: [id number]  Safety measures: acute cva  Referring provider: Yovani Rudolph MD    Encounter Diagnosis     ICD-10-CM    1. Other symbolic dysfunctions  R48.8       2. Cognitive deficits  R41.89       3. Cerebrovascular accident (CVA), unspecified mechanism (HCC)  I63.9                 Visit Tracking:  POC expires Unit limit Auth Expiration date PT/OT + Visit Limit?   24 No cog tx codes 3/2/2025 52 PT, OT, ST combined                           Visit/Unit Tracking  AUTH Status:  Date   IE 9/10 9/12 9/17 9/19          yes Used 1 2 3 4 5           Remaining  7 6 5 4 3              Subjective/Behavioral:    Patient reported no new concerns for today's session.      Objective/Assessment:    Short-term goals: (to be achieved in 6-8 weeks)    Patient will be educated on word finding strategies (i.e., circumlocution) for improved generative naming and verbal expression skills.      Patient will facilitate planning by completing thought organization tasks (e.g., sequencing, deduction puzzles, etc.) with 80% accuracy to facilitate increased executive functioning, working memory, problem solving, and processing skills    IM session #: 1    ACTIVITY SPECIFICS & ADAPTATIONS: headphones, seated, both hands    TEMPO (SPEED): 54 trial 1, 58 trial 2  lower = slower processing & movement  higher = faster processing & movement    DIFFICULTY: 300  auto = advanced  100ms = moderate complexity  200-300ms = easiest    INTENSITY SETTIN  burst setting = 2 (easiest) to 15 (hardest)    TASK DURATION: 1.2/trial 1, 1.5/trial 2  sustained concentration & stamina     MS AVERAGE (TASK SCORE) % SRO (TARGET RESPONSES): SRO HITS/ HIGHEST IN-A-ROW: BURST SCORE:   Task 1  visual mode: score without center flash 31 54 4 3   Task 2  auditory mode only without guide sounds 29 50 4 3   Task 3  auditory mode only without guide sounds  with interactive game 33 49 5 2   Task 4  visual mode: score without center flash with cog task 125 21 5 1   Task 5  visual mode: score without center flash 77 23 4 1     Key:  MS AVERAGE (TASK SCORE): lower score = better performance    % SRO (TARGET RESPONSES): higher % = better performance    SRO HITS/HIGHEST IN-A-ROW: higher score = better performance    BURST SCORE: higher score = better performance    Cog task 4/5: patient was given a concrete category verbally and had to list 2 items per category. Task completed in 12/13 opp (92% acc) independently, increasing to 13/13 opp (100% acc) from min cueing. Patient benefited from semantic cues.    Patient tolerated IM well. Noted increased difficulties with multi-tasking component for IM.      Patient will complete auditory immediate and short term memory tasks to 80% accuracy to facilitate increased ability to retell narratives and recall information within functional living environment            To target mental manipulation and working memory, patient will participate in word finding activity (i.e., anagrams) with 80% accuracy          Patient will answer questions regarding story read aloud with 80% accuracy to facilitate improved auditory comprehension and recall    Patient will demonstrate divided/sustained/alternating/auditory attention tasks by responding to multiple tasks or details within tasks at the same time with min cues in a distracting environment with 80% accuracy    Patient will complete concrete and abstract categorization tasks to 80% accuracy to facilitate improved generative naming skills and working memory      Patient will be educated on the use of internal and external memory aids and compensatory strategies with 80% accuracy to facilitate increased recall of routine, personal information, and recent events,       Plan:  -Patient was provided with home exercises/activities to target goals in plan of care at the end of today's  session.  -Continue with current plan of care.

## 2024-09-19 NOTE — PROGRESS NOTES
"Daily Note     Today's date: 2024  Patient name: Carolina Allison  : 1975  MRN: [id number]  Referring provider: Yovani Rudolph MD  Dx:   Encounter Diagnosis     ICD-10-CM    1. Right sided weakness  R53.1       2. Impaired functional mobility, balance, gait, and endurance  Z74.09       3. At risk for falls  Z91.81                    Subjective: Pt requests to improve her strength and endurance.      Objective: See treatment diary below    NMR/HIGT                                                                   - Amb on treadmill at 1.5 mph to warm up, up to 3.4 mph at max at 2% incline, 2 mph to cool down (6 mins total) to promote increased speed  - Floor recovery x8/ea  - Power step up 12\" + high knee drive 2x10/ea  - STS with med ball floor <> slam 2x12  - Tandem amb with cog DT (recite numbers backwards) 3 laps  - HKM with 10# TT hold, 3s iso with cog DT (list foods, 2nd letter > start of next) 3 laps   - KB swings #10 60s    Assessment: Tolerated treatment well. Continue to increase HR to promote BDNF release and improve dynamic balance/functional mobility. Difficulty with floor recovery due to reduced LE power generation; will continue to challenge in upcoming visits. Patient demonstrated fatigue post treatment, exhibited good technique with therapeutic exercises, and would benefit from continued PT.      Plan: Continue per plan of care.      POC expires Unit limit Auth Expiration date PT/OT + Visit Limit?   24  pend 52 PCY combined                           Visit/Unit Tracking  AUTH Status: pend Date IE - 9/4 9/10 9/12 9/17 9/19          52 PCY combined Used 1+1 1+1 1+1 1+1 1+1           Remaining  50 48 46 44 42               "

## 2024-09-23 ENCOUNTER — RA CDI HCC (OUTPATIENT)
Dept: OTHER | Facility: HOSPITAL | Age: 49
End: 2024-09-23

## 2024-09-24 ENCOUNTER — APPOINTMENT (OUTPATIENT)
Age: 49
End: 2024-09-24
Payer: COMMERCIAL

## 2024-09-27 ENCOUNTER — APPOINTMENT (OUTPATIENT)
Age: 49
End: 2024-09-27
Payer: COMMERCIAL

## 2024-09-30 ENCOUNTER — OFFICE VISIT (OUTPATIENT)
Dept: INTERNAL MEDICINE CLINIC | Facility: CLINIC | Age: 49
End: 2024-09-30
Payer: COMMERCIAL

## 2024-09-30 VITALS
SYSTOLIC BLOOD PRESSURE: 126 MMHG | OXYGEN SATURATION: 98 % | BODY MASS INDEX: 44.15 KG/M2 | RESPIRATION RATE: 17 BRPM | HEART RATE: 120 BPM | HEIGHT: 65 IN | DIASTOLIC BLOOD PRESSURE: 84 MMHG | WEIGHT: 265 LBS

## 2024-09-30 DIAGNOSIS — Z12.11 SCREENING FOR COLON CANCER: ICD-10-CM

## 2024-09-30 DIAGNOSIS — Z23 FLU VACCINE NEED: Primary | ICD-10-CM

## 2024-09-30 DIAGNOSIS — E11.9 TYPE 2 DIABETES MELLITUS WITHOUT COMPLICATION, WITHOUT LONG-TERM CURRENT USE OF INSULIN (HCC): ICD-10-CM

## 2024-09-30 DIAGNOSIS — E06.3 HASHIMOTO'S THYROIDITIS: ICD-10-CM

## 2024-09-30 LAB — SL AMB POCT HEMOGLOBIN AIC: 7.2 (ref ?–6.5)

## 2024-09-30 PROCEDURE — 83036 HEMOGLOBIN GLYCOSYLATED A1C: CPT | Performed by: INTERNAL MEDICINE

## 2024-09-30 PROCEDURE — 90471 IMMUNIZATION ADMIN: CPT | Performed by: INTERNAL MEDICINE

## 2024-09-30 PROCEDURE — 82043 UR ALBUMIN QUANTITATIVE: CPT | Performed by: INTERNAL MEDICINE

## 2024-09-30 PROCEDURE — 90656 IIV3 VACC NO PRSV 0.5 ML IM: CPT | Performed by: INTERNAL MEDICINE

## 2024-09-30 PROCEDURE — 82570 ASSAY OF URINE CREATININE: CPT | Performed by: INTERNAL MEDICINE

## 2024-09-30 PROCEDURE — 99214 OFFICE O/P EST MOD 30 MIN: CPT | Performed by: INTERNAL MEDICINE

## 2024-09-30 RX ORDER — INSULIN GLARGINE 100 [IU]/ML
10 INJECTION, SOLUTION SUBCUTANEOUS
Qty: 10 ML | Refills: 3 | Status: SHIPPED | OUTPATIENT
Start: 2024-09-30 | End: 2024-10-03

## 2024-09-30 RX ORDER — LEVOTHYROXINE SODIUM 100 UG/1
TABLET ORAL
Qty: 90 TABLET | Refills: 0 | Status: SHIPPED | OUTPATIENT
Start: 2024-09-30

## 2024-09-30 RX ORDER — LEVOTHYROXINE SODIUM 112 UG/1
TABLET ORAL
Qty: 90 TABLET | Refills: 0 | Status: SHIPPED | OUTPATIENT
Start: 2024-09-30

## 2024-09-30 RX ORDER — INSULIN GLARGINE 100 [IU]/ML
15 INJECTION, SOLUTION SUBCUTANEOUS
Qty: 10 ML | Refills: 3 | Status: SHIPPED | OUTPATIENT
Start: 2024-09-30 | End: 2024-09-30

## 2024-09-30 RX ORDER — DIABETIC SUPPLIES,MISCELL
KIT MISCELLANEOUS
Qty: 200 EACH | Refills: 5 | Status: SHIPPED | OUTPATIENT
Start: 2024-09-30

## 2024-09-30 RX ORDER — GLIPIZIDE 5 MG/1
5 TABLET ORAL
Qty: 30 TABLET | Refills: 5 | Status: SHIPPED | OUTPATIENT
Start: 2024-09-30 | End: 2024-09-30

## 2024-09-30 NOTE — PROGRESS NOTES
Ambulatory Visit  Name: Carolina Allison      : 1975      MRN: [id number]  Encounter Provider: Yovani Rudolph MD  Encounter Date: 2024   Encounter department: [facility]IC[facility] [place]    Check EKG if tachycardia continues.  Assessment & Plan  Type 2 diabetes mellitus without complication, without long-term current use of insulin (HCC)    Lab Results   Component Value Date    HGBA1C 7.2 (A) 2024       Orders:    glucose blood test strip; Test blood sugar three times daily.    Lancets (LifeScan Unistik 2) MISC; Test blood sugar three times daily.    POCT hemoglobin A1c    POCT urine microalbumin/creatinine    tirzepatide 2.5 MG/0.5ML; Inject 0.5 mL (2.5 mg total) under the skin every 7 days    insulin glargine (Lantus) 100 units/mL subcutaneous injection; Inject 10 Units under the skin daily at bedtime    Mounjaro is very expensive and she is not working right now. A1c excellent. Until she can afford the mounjaro, take lantus at HS. Eye exam done.  Hashimoto's thyroiditis    Orders:    levothyroxine 100 mcg tablet; Take one tab po every other day (alt with 112 mcg)    levothyroxine 112 mcg tablet; Take one tab po every other day (alt with 110 mcg)    Flu vaccine need    Orders:    Fluzone 0.5 mL IM      Depression Screening and Follow-up Plan: Clincally patient does not have depression. No treatment is required.       History of Present Illness   Patient is here for routine follow up, reviewed chronic medical problems.        History obtained from : patient  Review of Systems   Constitutional:  Negative for chills and fever.   HENT:  Negative for ear pain and sore throat.    Eyes:  Negative for pain and visual disturbance.   Respiratory:  Negative for cough and shortness of breath.    Cardiovascular:  Negative for chest pain and palpitations.   Gastrointestinal:  Negative for abdominal pain and vomiting.   Genitourinary:  Negative for dysuria and hematuria.   Musculoskeletal:   Negative for arthralgias and back pain.   Skin:  Negative for color change and rash.   Neurological:  Negative for seizures and syncope.   All other systems reviewed and are negative.    Past Medical History   Past Medical History:   Diagnosis Date    Anemia     Anesthesia     Cardiac event    Asthma     Bell's palsy     COVID-19 10/20/2020    Diabetes mellitus (HCC)     borderline-diet controlled    Diabetes mellitus during pregnancy     Disease of thyroid gland     Hashimoto's disease     Migraine      Past Surgical History:   Procedure Laterality Date     SECTION      HYSTEROSCOPY      with resection of intrauterine septum    CT HYSTEROSCOPY BX ENDOMETRIUM&/POLYPC W/WO D&C N/A 2023    Procedure: DILATATION AND CURETTAGE (D&C) WITH HYSTEROSCOPY;  Surgeon: Dunia Sarmiento MD;  Location: [facility];  Service: Gynecology    CT LAPS TOTAL HYSTERECT 250 GM/< W/RMVL TUBE/OVARY Bilateral 2023    Procedure: HYSTERECTOMY LAPAROSCOPIC TOTAL WITH BILATERAL SALPINGO-OOPHERECTOMY;  Surgeon: Kailey Aguirre MD;  Location: [facility];  Service: Gynecology    US GUIDED THYROID BIOPSY  2019    US GUIDED THYROID BIOPSY  2019     Family History   Problem Relation Age of Onset    Migraines Mother     Heart disease Father         cardiac disorder    Hyperlipidemia Father     Hypertension Father     Breast cancer Maternal Grandmother     Cervical cancer Maternal Grandmother     Ovarian cancer Paternal Grandmother     Cancer Paternal Grandmother     Cervical cancer Maternal Aunt     Endometrial cancer Maternal Aunt 20    Breast cancer Paternal Aunt     Endometrial cancer Paternal Aunt 30    Uterine cancer Paternal Aunt     Ovarian cancer Paternal Aunt     Colon cancer Neg Hx      Current Outpatient Medications on File Prior to Visit   Medication Sig Dispense Refill    acetaminophen (TYLENOL) 325 mg tablet Take 2 tablets (650 mg total) by mouth every 6 (six) hours as needed for mild pain  0     "aspirin (ECOTRIN LOW STRENGTH) 81 mg EC tablet Take 81 mg by mouth daily      Blood Glucose Monitoring Suppl ([employer] Blood Glucose Meter) w/Device KIT Test blood sugar three times daily. 1 kit 2    ibuprofen (MOTRIN) 600 mg tablet Take 1 tablet (600 mg total) by mouth every 6 (six) hours as needed for moderate pain 30 tablet 0    lisinopril (ZESTRIL) 5 mg tablet Take 1 tablet (5 mg total) by mouth daily 90 tablet 3    metFORMIN (GLUCOPHAGE) 1000 MG tablet Take 1 tablet (1,000 mg total) by mouth 2 (two) times a day with meals 180 tablet 1    [DISCONTINUED] glucose blood test strip Test blood sugar three times daily. 200 strip 5    [DISCONTINUED] Lancets ([employer] Unistik 2) MISC Test blood sugar three times daily. 200 each 5    [DISCONTINUED] levothyroxine 100 mcg tablet Take one tab po every other day (alt with 112 mcg) 90 tablet 0    [DISCONTINUED] levothyroxine 112 mcg tablet Take one tab po every other day (alt with 110 mcg) 90 tablet 0    [DISCONTINUED] tirzepatide 2.5 MG/0.5ML Inject 0.5 mL (2.5 mg total) under the skin every 7 days 2 mL 0    [DISCONTINUED] estradiol (ESTRACE) 0.1 mg/g vaginal cream Insert 0.5 g into the vagina 2 (two) times a week (Patient not taking: Reported on 8/16/2024) 42.5 g 3    [DISCONTINUED] predniSONE 10 mg tablet 4 tablets for 4d then 3 tablets for 4d then 2 tablets for 4d then 1 tablet for 4d (Patient not taking: Reported on 9/30/2024)       No current facility-administered medications on file prior to visit.     Allergies   Allergen Reactions    Iodinated Contrast Media Anaphylaxis     SOB, chest tightness, required epinephrine          Objective   /84 (BP Location: Left arm, Patient Position: Sitting, Cuff Size: Large)   Pulse (!) 120   Resp 17   Ht 5' 5\" (1.651 m)   Wt 120 kg (265 lb)   LMP 09/01/2023   SpO2 98%   BMI 44.10 kg/m²     Physical Exam  Vitals and nursing note reviewed.   Constitutional:       General: She is not in acute distress.     Appearance: She is " well-developed.   HENT:      Head: Normocephalic and atraumatic.   Eyes:      Conjunctiva/sclera: Conjunctivae normal.   Cardiovascular:      Rate and Rhythm: Normal rate and regular rhythm.      Heart sounds: No murmur heard.  Pulmonary:      Effort: Pulmonary effort is normal. No respiratory distress.      Breath sounds: Normal breath sounds.   Abdominal:      Palpations: Abdomen is soft.      Tenderness: There is no abdominal tenderness.   Musculoskeletal:         General: No swelling.      Cervical back: Neck supple.   Skin:     General: Skin is warm and dry.      Capillary Refill: Capillary refill takes less than 2 seconds.   Neurological:      Mental Status: She is alert.   Psychiatric:         Mood and Affect: Mood normal.       I have spent a total time of 15 minutes in caring for this patient on the day of the visit/encounter including Instructions for management, Patient and family education, Impressions, and Documenting in the medical record.

## 2024-09-30 NOTE — ASSESSMENT & PLAN NOTE
Orders:    levothyroxine 100 mcg tablet; Take one tab po every other day (alt with 112 mcg)    levothyroxine 112 mcg tablet; Take one tab po every other day (alt with 110 mcg)

## 2024-09-30 NOTE — ASSESSMENT & PLAN NOTE
Lab Results   Component Value Date    HGBA1C 7.2 (A) 09/30/2024       Orders:    glucose blood test strip; Test blood sugar three times daily.    Lancets (LifeScan Unistik 2) MISC; Test blood sugar three times daily.    POCT hemoglobin A1c    POCT urine microalbumin/creatinine    tirzepatide 2.5 MG/0.5ML; Inject 0.5 mL (2.5 mg total) under the skin every 7 days    insulin glargine (Lantus) 100 units/mL subcutaneous injection; Inject 10 Units under the skin daily at bedtime    Mounjaro is very expensive and she is not working right now. A1c excellent. Until she can afford the mounjaro, take lantus at HS. Eye exam done.

## 2024-10-01 ENCOUNTER — OFFICE VISIT (OUTPATIENT)
Age: 49
End: 2024-10-01
Payer: COMMERCIAL

## 2024-10-01 ENCOUNTER — TELEPHONE (OUTPATIENT)
Dept: ADMINISTRATIVE | Facility: OTHER | Age: 49
End: 2024-10-01

## 2024-10-01 DIAGNOSIS — Z74.09 IMPAIRED FUNCTIONAL MOBILITY, BALANCE, GAIT, AND ENDURANCE: ICD-10-CM

## 2024-10-01 DIAGNOSIS — Z91.81 AT RISK FOR FALLS: ICD-10-CM

## 2024-10-01 DIAGNOSIS — R53.1 RIGHT SIDED WEAKNESS: Primary | ICD-10-CM

## 2024-10-01 DIAGNOSIS — I63.9 CEREBROVASCULAR ACCIDENT (CVA), UNSPECIFIED MECHANISM (HCC): ICD-10-CM

## 2024-10-01 DIAGNOSIS — R48.8 OTHER SYMBOLIC DYSFUNCTIONS: Primary | ICD-10-CM

## 2024-10-01 DIAGNOSIS — R41.89 COGNITIVE DEFICITS: ICD-10-CM

## 2024-10-01 PROCEDURE — 92507 TX SP LANG VOICE COMM INDIV: CPT

## 2024-10-01 PROCEDURE — 97112 NEUROMUSCULAR REEDUCATION: CPT

## 2024-10-01 NOTE — TELEPHONE ENCOUNTER
Upon review of the In Basket request and the patient's chart, initial outreach has been made via fax to facility. Please see Contacts section for details.     Thank you  Isela Michael MA

## 2024-10-01 NOTE — TELEPHONE ENCOUNTER
01/18/22 1344   OTHER   Discipline physical therapy assistant   Rehab Time/Intention   Session Not Performed patient/family declined treatment      ----- Message from Shari SHAFER sent at 9/30/2024  4:04 PM EDT -----  Regarding: Care Gap Request  09/30/24 4:04 PM     Hello, our patient above has had Diabetic Eye Exam completed/performed. Please assist in updating the patient chart by making an External outreach to     [facility]  Address: [address] [place] [address] [address], [place] [address]  Phone: [phone]        Thank you,  Shari Delgado LPN  [facility] [place]

## 2024-10-01 NOTE — LETTER
Diabetic Eye Exam Form    Date Requested: 10/01/24  Patient: Carolina Allison  Patient : 1975   Referring Provider: Yovani Rudolph MD      DIABETIC Eye Exam Date _______________________________      Type of Exam MUST be documented for Diabetic Eye Exams. Please CHECK ONE.     Retinal Exam       Dilated Retinal Exam       OCT       Optomap-Iris Exam      Fundus Photography       Left Eye - Please check Retinopathy or No Retinopathy        Exam did show retinopathy    Exam did not show retinopathy       Right Eye - Please check Retinopathy or No Retinopathy       Exam did show retinopathy    Exam did not show retinopathy       Comments __________________________________________________________    Practice Providing Exam ______________________________________________    Exam Performed By (print name) _______________________________________      Provider Signature ___________________________________________________      These reports are needed for  compliance.  Please fax this completed form and a copy of the Diabetic Eye Exam report to our office located at [address] [place] [address] as soon as possible via Fax [phone] attention Isela: Phone [phone]  We thank you for your assistance in treating our mutual patient.

## 2024-10-01 NOTE — PROGRESS NOTES
Daily Note     Today's date: 10/1/2024  Patient name: Carolina Allison  : 1975  MRN: [id number]  Referring provider: Yovani Rudolph MD  Dx:   Encounter Diagnosis     ICD-10-CM    1. Right sided weakness  R53.1       2. Impaired functional mobility, balance, gait, and endurance  Z74.09       3. At risk for falls  Z91.81         Start Time: 930  Stop Time: 1015  Total time in clinic (min): 45 minutes    Subjective: Pt reports she went hiking and had no losses of balance, but it made her tired.      Objective: See treatment diary below    NMR/HIGT                                                                   Tandem on floor 3 laps  High knee march with 10# tidal tank 3 laps  Lateral on foam with 10# tidal tank 5 laps  Tandem on foam 5 laps  Squats on small river rocks 2x10  Squats on medium river rocks 2x10  Sit to stand with 20# tidal tank with feet on foam 2x15  Tandem amb forward/backward with cog DT (counting backwards by 4's from 862) 50'x2    Assessment: Tolerated treatment well. No losses of balance this session. When she does have moments of instability, she uses appropriate righting reactions that are sufficient in amplitude. More difficulty noted with single leg stance conditions, but this has improved compared to prior sessions. Next session with be a re-evaluation and a discharge depending on test results. Patient demonstrated fatigue post treatment, exhibited good technique with therapeutic exercises, and would benefit from continued PT.      Plan: Potential discharge next visit.     POC expires Unit limit Auth Expiration date PT/OT + Visit Limit?   24  pend 52 PCY combined                           Visit/Unit Tracking  AUTH Status: pend Date IE - 9/4 9/10 9/12 9/17 9/19 10/1         52 PCY combined Used 1+1 1+1 1+1 1+1 1+1 1+1          Remaining  50 48 46 44 42 40

## 2024-10-01 NOTE — PROGRESS NOTES
Daily Speech Treatment Note    Today's date: 10/1/2024   Patient’s name: Carolina Allison  : 1975  MRN: [id number]  Safety measures: acute cva  Referring provider: Yovani Rudolph MD    Encounter Diagnosis     ICD-10-CM    1. Other symbolic dysfunctions  R48.8       2. Cognitive deficits  R41.89       3. Cerebrovascular accident (CVA), unspecified mechanism (HCC)  I63.9         Visit Tracking:  POC expires Unit limit Auth Expiration date PT/OT + Visit Limit?   24 No cog tx codes 3/2/2025 52 PT, OT, ST combined                           Visit/Unit Tracking  AUTH Status:  Date   IE 9/10 9/12 9/17 9/19 10/1         yes Used 1 2 3 4 5 6          Remaining  7 6 5 4 3 2             Subjective/Behavioral:    During vacation, patient reported she could not remember certain places and the routes on roads that were taken.       Objective/Assessment:Patient completed home exercises with 100% accuracy. However, for listing 3 synonyms task patient reported she utilized resources to complete task and math coding task she attempted 2x to achieve correct answer. Patient reports increased frustrations of how difficult the math coding task was in comparison to PLOF.    Short-term goals: (to be achieved in 6-8 weeks)    Patient will be educated on word finding strategies (i.e., circumlocution) for improved generative naming and verbal expression skills.      Patient will facilitate planning by completing thought organization tasks (e.g., sequencing, deduction puzzles, etc.) with 80% accuracy to facilitate increased executive functioning, working memory, problem solving, and processing skills          Patient will complete auditory immediate and short term memory tasks to 80% accuracy to facilitate increased ability to retell narratives and recall information within functional living environment      Sorting and Remembering categories: patient was given 16 words and had to sort into 4 different categories. Patient  reviewed the words with verbal rehearsal, grouping, associations, and verbal repetition strategies (internal memory strategies). Clinician removed visual aid after 3 mins for trial 1 and 2.5 mins for trial 2.      Trial 1:Patient recalled independently 15/16 words, increasing to 16/16 with min semantic cues. Patient reported the internal memory aid strategies she utilized were grouping the words together, verbal rehearsing,  and chunking together by categories.   Trial 2: Patient recalled independently 16/16 words.Patient reported the internal memory aid strategies she utilized were grouping the words together, verbal rehearsing,  and chunking together by categories.      Patient was given a reading passage to independently read for 5 mins for passage 1 and 2.5 mins for passage 2. During the given time, patient to review key points of passage. After 5 mins, clinician took away reading and asked comprehension questions regarding passage. Passage 1: Task completed in 8/11 opp (72% acc) independently, increasing to 10/11 opp (90% acc) from min cueing. Patient benefited from multiple choice. Passage 2: Task completed in 7/7 opp (100% acc) independently.         To target mental manipulation and working memory, patient will participate in word finding activity (i.e., anagrams) with 80% accuracy          Patient will answer questions regarding story read aloud with 80% accuracy to facilitate improved auditory comprehension and recall      Patient was given verbal word problem regarding time to solve. Task completed in 8/10 opp (80% acc) independently, increasing to 10/10 opp (100% acc) from min cueing. Patient benefited from repetition of word problem    Patient will demonstrate divided/sustained/alternating/auditory attention tasks by responding to multiple tasks or details within tasks at the same time with min cues in a distracting environment with 80% accuracy    Patient will complete concrete and abstract  categorization tasks to 80% accuracy to facilitate improved generative naming skills and working memory      Patient will be educated on the use of internal and external memory aids and compensatory strategies with 80% accuracy to facilitate increased recall of routine, personal information, and recent events,       Plan:  -Patient was provided with home exercises/activities to target goals in plan of care at the end of today's session.  -Continue with current plan of care.

## 2024-10-02 NOTE — TELEPHONE ENCOUNTER
Upon review of the In Basket request we were able to locate, review, and update the patient chart as requested for Diabetic Eye Exam.    Any additional questions or concerns should be emailed to the Practice Liaisons via the appropriate education email address, please do not reply via In Basket.    Thank you  Isela Michael MA   [facility]

## 2024-10-02 NOTE — PROGRESS NOTES
"Speech-Language Pathology Re-Evaluation    Today's date: 10/3/2024   Patient’s name: Carolina Allison  : 1975  MRN: [id number]  Safety measures:   Referring provider: Yovani Rudolph MD    Encounter Diagnosis     ICD-10-CM    1. Other symbolic dysfunctions  R48.8       2. Cognitive deficits  R41.89       3. Cerebrovascular accident (CVA), unspecified mechanism (HCC)  I63.9           Assessment:   Patient continues to present with mild cognitive-linguistic deficits c//b decreased processing speed, multi-tasking decreased concentration/attention, verbal fluency,  delayed recall of locations/information, and organization of thoughts. Since initiation of OP ST services, patient has achieved comprehension of internal/external memory aid strategies and has achieved categorization to improve generative naming skills goal! For today's re-evaluation, patient was given the TOAL which is an assessment that evaluate expressive language skills. For the sentence combining task where patient was given two clauses verbally and had to combine them to generate a comprehensive sentence, patient scored in the Poor. For naming word opposites and similarities section, patient scored in the Average. However, additional processing time was required to provide answers and visible frustrations c/b deep breaths and sighs were noted due to how difficult the task was for her. For the spoken analogies section where patient had to finish the last word of the analogy, patient scored Below Average. Overall, patient did struggle with completion of testing and did verbalize at the end of testing, \"this should not be this difficult.\" Clinician and patient are in agreement progression has been made thus far, however, PLOF has not been achieved. PLOF is important for patient due to her employment.Patient continues to be  motivated for OP ST services by having consistent attendance and completing home exercises. Patient would continue to " benefit from outpatient skilled Speech Therapy services to maximize cognitive-linguistic skills after acute CVA to return to PLOF. Will continue  2x week for 45 mins sessions to maximize acute healing phase of recovery. OP ST services will target increased functional recall, executive functioning (processing, problem-solving, thought organization, etc.),  increased verbal fluency, Interactive Metronome, word-finding strategies education, successful completion of daily tasks and structured tasks, follow directions within functional activities and unstructured tasks, support positive communication interactions with both familiar and unfamiliar listeners, promote safety and facilitate overall improved quality of life.      Short-term goals: (to be achieved in 6-8 weeks)    Patient will be educated on word finding strategies (i.e., circumlocution) for improved generative naming and verbal expression skills. DID NOT ADDRESS YET    Patient will facilitate planning by completing thought organization tasks (e.g., sequencing, deduction puzzles, etc.) with 80% accuracy to facilitate increased executive functioning, working memory, problem solving, and processing skills ONGOING/PARTIALLY MET    Patient will complete auditory immediate and short term memory tasks to 80% accuracy to facilitate increased ability to retell narratives and recall information within functional living environmentONGOING/PARTIALLY MET    To target mental manipulation and working memory, patient will participate in word finding activity (i.e., anagrams) with 80% accuracyONGOING/PARTIALLY MET    Patient will answer questions regarding story read aloud with 80% accuracy to facilitate improved auditory comprehension and recallONGOING/PARTIALLY MET    Patient will demonstrate divided/sustained/alternating/auditory attention tasks by responding to multiple tasks or details within tasks at the same time with min cues in a distracting environment with 80%  "accuracyONGOING/PARTIALLY MET    Patient will complete concrete and abstract categorization tasks to 80% accuracy to facilitate improved generative naming skills and working memory MET      Patient will be educated on the use of internal and external memory aids and compensatory strategies with 80% accuracy to facilitate increased recall of routine, personal information, and recent events, MET    Long Term Goals    Patient will demonstrate cognitive-communication skills consistent with age and education given use of compensatory strategies when needed to resume baseline activities and responsibilities in home, community, and work/school settings by discharge. ONGOING/PARTIALLY MET    Patient will complete cognitive-linguistic therapy that addresses patient's specific deficits in processing speed, short-term working memory, attention to detail, monitoring, sequencing, and organization skills, with instruction, to alleviate effects of executive functioning disorder deficits by discharge. ONGOING/PARTIALLY MET    Patient will complete higher-level expressive language tasks (e.g., word definitions, idioms, synonym/antonyms, etc) with 80% accuracy to improve functional communication skills by discharge. ONGOING/PARTIALLY MET    Plan:  Patient would CONTINUED benefit from outpatient skilled Speech Therapy services: Cognitive-linguistic therapy, OT IE, Neurology     Frequency: 2x weekly  Duration: 3 months    Intervention certification from: 9/4/2024  Intervention certification to: 12/4/2024    Subjective:  Patient continues to report she has not achieved PLOF with still difficulties with concentration    Patient's goal(s): \"to return to baseline cognitive status\"      Objective (testing):  The Test of Adult Language - Fourth Edition (TOAL-4) is a standardized, norm-referenced assessment measuring important communicative abilities in spoken and written language. The test in normed on individuals 12:0-24:11. Due to these " age restrictions, these standardized scores should not be compared to standard or percentile rank. Objective measures were taken to complete a diagnostic impression and prognosis for this patient. The TOAL-4 has 6 subtests; their results are reported as percentile ranks and scaled scores. The results of the TOAL-4 are generally used for three purposes; (a) to identify adolescents and adults who score significantly below their peers and therefore might need help improving their language proficiency, (b) to determine areas of relative strength and weakness among language abilities, and (c) to serve as a research tool in studies investigating language problems in adolescents and adults. Due to time constraints, only portions of the standardized assessment were administered on this date of service.    Subtest: Raw Score: Percentile:  Scaled Score: Descriptive Ratin. Word Opposites  25%tile 8 Average   3. Spoken Analogies 10/26 9%tile 6 Below Average   4. Word Similarities  63%tile 11 Average   Sentence Combining  2%ile 4 Poor           Visit Tracking:  POC expires Unit limit Auth Expiration date PT/OT + Visit Limit?   24 No cog tx codes 3/2/2025 52 PT, OT, ST combined                           Visit/Unit Tracking  AUTH Status:  Date   IE 9/10 9/12 9/17 9/19 10/1 10/3  RE        yes Used 1 2 3 4 5 6 7         Remaining  7 6 5 4 3 2 1            Intervention comments:  45 mins of speech sound production evaluation

## 2024-10-03 ENCOUNTER — TELEPHONE (OUTPATIENT)
Age: 49
End: 2024-10-03

## 2024-10-03 ENCOUNTER — EVALUATION (OUTPATIENT)
Age: 49
End: 2024-10-03
Payer: COMMERCIAL

## 2024-10-03 DIAGNOSIS — Z74.09 IMPAIRED FUNCTIONAL MOBILITY, BALANCE, GAIT, AND ENDURANCE: ICD-10-CM

## 2024-10-03 DIAGNOSIS — R41.89 COGNITIVE DEFICITS: ICD-10-CM

## 2024-10-03 DIAGNOSIS — Z91.81 AT RISK FOR FALLS: ICD-10-CM

## 2024-10-03 DIAGNOSIS — E11.9 TYPE 2 DIABETES MELLITUS WITHOUT COMPLICATION, WITHOUT LONG-TERM CURRENT USE OF INSULIN (HCC): Primary | ICD-10-CM

## 2024-10-03 DIAGNOSIS — R48.8 OTHER SYMBOLIC DYSFUNCTIONS: Primary | ICD-10-CM

## 2024-10-03 DIAGNOSIS — R53.1 RIGHT SIDED WEAKNESS: Primary | ICD-10-CM

## 2024-10-03 DIAGNOSIS — I63.9 CEREBROVASCULAR ACCIDENT (CVA), UNSPECIFIED MECHANISM (HCC): ICD-10-CM

## 2024-10-03 PROCEDURE — 97530 THERAPEUTIC ACTIVITIES: CPT

## 2024-10-03 PROCEDURE — 92523 SPEECH SOUND LANG COMPREHEN: CPT

## 2024-10-03 PROCEDURE — 97112 NEUROMUSCULAR REEDUCATION: CPT

## 2024-10-03 RX ORDER — INSULIN GLARGINE 100 [IU]/ML
10 INJECTION, SOLUTION SUBCUTANEOUS
Qty: 10 ML | Refills: 0 | Status: SHIPPED | OUTPATIENT
Start: 2024-10-03

## 2024-10-03 NOTE — TELEPHONE ENCOUNTER
Reason for call:   [x] Prior Auth  [] Other:     Caller:  [x] Patient  [] Pharmacy  Name:   Address:   Callback Number:     Medication: Insulin Glargine 100 units , inject 10 units under the skin daily at bedtime      Ordering Provider:   [x] PCP/Provider -   [] Speciality/Provider -

## 2024-10-03 NOTE — TELEPHONE ENCOUNTER
Patient called the RX Refill Line. Message is being forwarded to the office.     Patient said that the insulin glargine 100 units needs a PA. I did start the process and sent the info to the prior auth team. Patient is not sure is you wan to complete the PA or just prescribe a different medication?    Please contact patient at  [phone]

## 2024-10-03 NOTE — PROGRESS NOTES
PT Discharge Note       POC expires Unit limit Auth Expiration date PT/OT + Visit Limit?   24  pend 52 PCY combined                           Visit/Unit Tracking  AUTH Status: pend Date IE - 9/4 9/10 9/12 9/17 9/19 10/1 10/3 PN        52 PCY combined Used 1+1 1+1 1+1 1+1 1+1 1+1 1+1         Remaining  50 48 46 44 42 40 38          Today's date: 10/3/2024  Patient name: Carolina Allison  : 1975  MRN: [id number]  Referring provider: Yovani Rudolph MD  Dx:   Encounter Diagnosis     ICD-10-CM    1. Right sided weakness  R53.1       2. Impaired functional mobility, balance, gait, and endurance  Z74.09       3. At risk for falls  Z91.81           Assessment  Assessment details: Patient is a 49 y.o. Female who presented to skilled outpatient PT with impaired muscle strength, impaired functional strength, impaired balance, impaired righting reactions, impaired endurance, impaired functional mobility, impaired ability to perform ADLs. Co-morbidities that impact her care include: Hashimoto's, DM2, Asthma. Since IE she demonstrates MDC improvements on all outcome measures, indicating clinically significant improvements with functional transfers/mobility, gait speed, balance, and endurance. No longer considered at high risk of falls, and all values are within age- compared normative values. She reports 95% return to PLOF. At this time, patient will be D/C from skilled PT to comprehensive HEP to maintain gains. Patient verbalized understanding and without questions. Advised patient to communicate with primary therapist regarding questions.    Outcome Measures Initial Eval  9/4 10/3 PN   5xSTS 16.95 sec with LUE support 8.17s no UE assist   TUG 19.19 sec with SPC 5.82s no AD   10 meter defer 1.78 m/s no AD   MENJIVAR 40/56 defer   FGA 16 30/30   6MWT 500 ft no AD 1,700 ft no AD   ABC 56.25% 95.63%       Impairments: Abnormal gait, Abnormal muscle  tone, Activity intolerance, Impaired balance, Impaired physical strength, Lacks appropriate HEP, Poor posture, Poor body mechanics, Safety issue, Abnormal movement, Abnormal muscle firing  Understanding of Dx/Px/POC: Good  Prognosis: Good      Patient verbalized understanding of POC.      Please contact me if you have any questions or recommendations. Thank you for the referral and the opportunity to share in Carolina Allison's care.      Plan  Patient would benefit from: Skilled PT  Planned modality interventions: Biofeedback, Cryotherapy, TENS, Thermotherapy  Planned therapy interventions: Abdominal trunk stabilization, ADL training, Balance, Balance/WB training, Breathing training, Body mechanics training, Coordination, Functional ROM exercises, Gait training, HEP, Joint Mobilization, Manual Therapy, Mcneil taping, Motor coordination training, Neuromuscular re-education, Patient education, Postural training, Strengthening, Stretching, Therapeutic activities, Therapeutic exercises, Therapeutic training, Transfer training, Activity modification, Work reintegration  Frequency: 1-3x/wk  Plan of Care beginning date: 9/4/24  Plan of Care expiration date: 3 months - 12/4/2024  Treatment plan discussed with: Patient    Outcome Measures Initial Eval  9/4 10/3 PN   5xSTS 16.95 sec with LUE support 8.17s no UE assist   TUG 19.19 sec with SPC 5.82s no AD   10 meter defer 1.78 m/s no AD   MENJIVAR 40/56 defer   FGA 16/30 30/30   6MWT 500 ft no AD 1,700 ft no AD   ABC 56.25% 95.63%     Goals  Short Term Goals (4-6 weeks):    - Patient will improve time on TUG by 2.9 seconds to facilitate improved safety in all ambulation met  - Patient will improve scoring on FGA by 4 points to progress safety met  - Patient will be independent in basic HEP 2-3 weeks met  - Patient will improve 5xSTS score by 2.3 seconds to promote improved LE functional strength needed for ADLs met  - Patient will complete 6MWT met  - Patient will complete  10mWT met    Long Term Goals (8-12 weeks):  - Patient will be independent in a comprehensive home exercise program met  - Patient will improve gait speed by 0.18 m/s to improve safety with community ambulation with least restrictive assistive devicemet  - Patient will improve MENJIVAR by 6 points to facilitate return to safe independent ambulation - DC goal   - Patient will improve scoring on FGA by 4 points to progress safety with dynamic tasksmet  - Patient will be able to demonstrate HT in gait without veeringmet  - Patient will improve 6 Minute Walk Test score by 190 feet to promote improved cardiovascular endurancemet  - Patient will report 50% reduction in near falls in order to improve safety with functional tasks and reduce his risk for fallsmet  - Patient will report going on walks at least 3 days per week to promote independence and improved cardiovascular endurancemet  - Patient will be able to ascend/descend stairs reciprocally without UE assist to promote independence and safety with ADLsmet  - Patient will report 50% reduction in near falls when ambulating on uneven terrainmet      Cut off score    All date taken from APTA Neuro Section or Rehab Measures      Menjivar:  Gretel et al., 2018  MDC: 2.7 pts    Amita et al., 2011  Cut-off score: 45/56    Chronic CVA  < 44/56 high risk for falls (Gretel et al., 2018)  < 47.5/56 slow walker status (Peter et al., 2011) 5xSTS: Guillermo 2010  MDC: 2.3 sec  Age Norms:  60-69: 11.1 sec  70-79: 12.6 sec  80-89: 14.8 sec    Sj 2010, Chronic Stroke  Chronic CVA: 12 sec   TUG  Mindy jalloh al., 2005  MDC: 2.9 sec    Cut off score:  >13.5 sec community dwelling adults  >32.2 frail elderly  <20 I for basic transfers  >30 dependent on transfers 10 Meter Walk Test:   Nils et al., 2006  Small meaningful change: 0.06 m/s  Substantial meaningful change: 0.14 m/s  MCID: 0.16 m/s    < 0.4 m/s household ambulators  0.4 - 0.8 m/s limited community ambulators  > 0.8 m/s  community ambulators   FGA: Anna et al., 2010  MCID: 4.2 pts  Geriatrics/community < 22/30 fall risk  Geriatrics/community < 20/30 unexplained falls DGI  MDC: vestibular - 4 pts  MDC: geriatric/community - 3 pts  Falls risk <19/24   6 Minute Walk Test  Nils et al., 2006  MDC: 60.98 m (200.01 ft)    Jarrett Aburto, & Gerda, 2012  MCID: 34.4 m    Age Norms  60-69: M - 1876 ft   F - 1765 ft  70-79: M - 1729 ft   F - 1545 ft  80-89: M - 1368 ft   F - 1286 ft Modified Kristen  0: No increase in tone  1: Catch and release or min resistance at end range  1+: Catch f/b min resistance throughout remainder (< half ROM)  2: Easily moved, but more marked tone throughout most ROM  3: Significant tone, PROM difficult  4: Rigid   MiniBest: Kaykay et al., 2013  CVA < 17.5 fall risk Pass (Acute CVA)  MDC: 1.8 points (acute), 3.2 points (chronic)         Subjective    History of Present Illness  Mechanism of injury: About 1 month ago, she started to have a facial droop and tingling. She started having trouble with cognition and speech. She went to the hospital and had right-sided weakness/numbness/tingling. She had home PT/OT.    UPDATE 10/3/2024: Denies falls and/or LOB in past month. Reports 90-95% return to PLOF. Still limited by reduced R-sided strength. No longer utilizing SPC.     Primary AD: SPC when not at home. Uses furniture at home to help with support.  Assist level at home: independent  WC usage: no  PLOF: independent    Patient goal: Get as close to baseline as possible    Pain  Current pain ratin-5/10  At best pain ratin/10  At worst pain ratin/10  Location: front of head (headache)  Aggravating factors: bright lights, busy environments    Social Support  Steps to enter house: 2-3 no railing  Stairs in house: FF with a railing   Lives in: 2 story home  Lives with: spouse, daughters (away at college, home in the summer)    Employment status:  (Not currently at work)  Hand dominance:  R    Treatments  Previous treatment: Home PT/OT  Current treatment: starting PT  Diagnostic Testing: MRI    Objective     Vitals  - HR: 103  - BP: 152/84 mmHg, RUE, seated, manual  - SPO2: 97%    HR Max  - 207 - (0.7x103)  = 135    LE MMT  - R Hip Flexion: 3+/5  - L Hip Flexion: 4-/5  - R Hip Extension: 3+/5 - L Hip Extension: 4-/5  - R Hip Abduction: 3+/5 - L Hip abduction: 3+/5  - R Hip adduction: 3+/5 - L Hip adduction: 4-/5  - R Knee Extension: 3+/5 - L Knee Extension: 4-/5  - R Knee Flexion: 3+/5 - L Knee Flexion: 4-/5  - R Ankle DF: 3+/5  - L Ankle DF: 4-/5  - R Ankle PF: 3+/5  - L Ankle PF: 4-/5    Sensation  - Light touch: tingling in R foot (lateral aspect)  - Temperature: intact    Coordination  - Dysmetria: normal  - Dysdiadochokinesia: normal  - Alternating Toe Taps: normal    Modified Kristen  Unremarkable    Clonus  - L: No  - R: No    Vision  - Glasses: Yes  - Abnormalities prior to CVA: No  - Abnormalities post CVA: No    Neglect  - R sided: No  - L sided: No    Pusher's Syndrome  - R sided: No  - L sided: No    Gait  Decreased gait speed, decreased step length bilaterally, varying base of support    Update 10/3/24 d/t infrequent episodes of dizziness with quick turns  mVBI asymptomatic  Ligamentous stability intact  VORx1 seated 1/10 dizziness, reproduction but mild        Outcome Measures Initial Eval  9/4 10/3 PN    5xSTS 16.95 sec with LUE support 8.17s no UE assist    TUG 19.19 sec with SPC 5.82s no AD    10 meter defer 1.78 m/s no AD    MENJIVAR 40/56 defer    FGA 16/30 30/30    6MWT 500 ft no AD 1,700 ft no AD    ABC 56.25% 95.63%       Comprehensive HEP -  Access Code: [email or web address]  URL: [email or web address]  Date: 10/03/2024  Prepared by: Elizabeth Campos    Exercises  - Proper Sit to Stand Technique  - 1 x daily - 5 x weekly - 2 sets - 10 reps  - Standing March with Counter Support  - 1 x daily - 5 x weekly - 2 sets - 10 reps - 3 sec hold  - Standing Hip Abduction with Counter  Support  - 1 x daily - 5 x weekly - 2 sets - 10 reps - 3 sec hold  - Standing Hip Extension with Counter Support  - 1 x daily - 5 x weekly - 2 sets - 10 reps - 3 sec hold  - Walking March  - 1 x daily - 5 x weekly - 3 sets  - Walking Tandem Stance  - 1 x daily - 5 x weekly - 3 sets  - Side Stepping with Resistance at Feet  - 1 x daily - 5 x weekly - 3 sets  - Step Up  - 1 x daily - 5 x weekly - 2 sets - 10 reps    Precautions: fall risk, DM2, asthma, Hashimoto's disease, standard precautions  Past Medical History:   Diagnosis Date    Anemia     Anesthesia     Cardiac event    Asthma     Bell's palsy     COVID-19 10/20/2020    Diabetes mellitus (HCC)     borderline-diet controlled    Diabetes mellitus during pregnancy     Disease of thyroid gland     Hashimoto's disease     Migraine

## 2024-10-07 ENCOUNTER — APPOINTMENT (OUTPATIENT)
Age: 49
End: 2024-10-07
Payer: COMMERCIAL

## 2024-10-07 ENCOUNTER — OFFICE VISIT (OUTPATIENT)
Age: 49
End: 2024-10-07
Payer: COMMERCIAL

## 2024-10-07 DIAGNOSIS — R48.8 OTHER SYMBOLIC DYSFUNCTIONS: Primary | ICD-10-CM

## 2024-10-07 DIAGNOSIS — I63.9 CEREBROVASCULAR ACCIDENT (CVA), UNSPECIFIED MECHANISM (HCC): ICD-10-CM

## 2024-10-07 DIAGNOSIS — R41.89 COGNITIVE DEFICITS: ICD-10-CM

## 2024-10-07 PROCEDURE — 92507 TX SP LANG VOICE COMM INDIV: CPT

## 2024-10-07 NOTE — PROGRESS NOTES
Daily Speech Treatment Note    Today's date: 10/7/2024   Patient’s name: Carolina Allison  : 1975  MRN: [id number]  Safety measures:   Referring provider: Yovani Rudolph MD    Encounter Diagnosis     ICD-10-CM    1. Other symbolic dysfunctions  R48.8       2. Cognitive deficits  R41.89       3. Cerebrovascular accident (CVA), unspecified mechanism (HCC)  I63.9         Visit Tracking:  POC expires Unit limit Auth Expiration date PT/OT + Visit Limit?   24 No cog tx codes 3/2/2025 52 PT, OT, ST combined                           Visit/Unit Tracking  AUTH Status:  Date   IE 9/10 9/12 9/17 9/19 10/1 10/3  RE 10/7       yes Used 1 2 3 4 5 6 7 8        Remaining  7 6 5 4 3 2 1 0           Subjective/Behavioral:  -Patient reported no new concerns for today's session.      Objective/Assessment:  -Reviewed testing results and goals in plan care with patient. Patient is in agreement at this time.      Short-term goals: (to be achieved in 6-8 weeks)    Patient will be educated on word finding strategies (i.e., circumlocution) for improved generative naming and verbal expression skills.     Patient will facilitate planning by completing thought organization tasks (e.g., sequencing, deduction puzzles, etc.) with 80% accuracy to facilitate increased executive functioning, working memory, problem solving, and processing skills     Patient will complete auditory immediate and short term memory tasks to 80% accuracy to facilitate increased ability to retell narratives and recall information within functional living environment    Patient given directions verbally and had to write the directions down and recall all the directions from the written aid (functional for return to employment). Task completed in 3/6 opp (50% acc) independently, increasing to 6/6 opp (100% acc) from min-mod cueing. Patient benefited from verbal repetition of directions.      To target mental manipulation and working memory, patient will  participate in word finding activity (i.e., anagrams) with 80% accuracy    Patient engaged in Boogle task where patient to create words from the given array of randomized letters. 3 min trials.    Trial 1: 12 words independently created increasing 19 words with min phonemic cues  Trial 2: 15 words independently created increasing to 20 words with min phonemic cues.      Patient will answer questions regarding story read aloud with 80% accuracy to facilitate improved auditory comprehension and recall    Patient will demonstrate divided/sustained/alternating/auditory attention tasks by responding to multiple tasks or details within tasks at the same time with min cues in a distracting environment with 80% accuracy  Plan:  -Patient was provided with home exercises/activities to target goals in plan of care at the end of today's session.  -Continue with current plan of care.

## 2024-10-10 ENCOUNTER — EVALUATION (OUTPATIENT)
Age: 49
End: 2024-10-10
Payer: COMMERCIAL

## 2024-10-10 ENCOUNTER — APPOINTMENT (OUTPATIENT)
Age: 49
End: 2024-10-10
Payer: COMMERCIAL

## 2024-10-10 ENCOUNTER — OFFICE VISIT (OUTPATIENT)
Age: 49
End: 2024-10-10
Payer: COMMERCIAL

## 2024-10-10 DIAGNOSIS — R41.89 COGNITIVE DEFICITS: ICD-10-CM

## 2024-10-10 DIAGNOSIS — R48.8 OTHER SYMBOLIC DYSFUNCTIONS: Primary | ICD-10-CM

## 2024-10-10 DIAGNOSIS — G51.0 BELL'S PALSY: ICD-10-CM

## 2024-10-10 DIAGNOSIS — R53.1 RIGHT SIDED WEAKNESS: Primary | ICD-10-CM

## 2024-10-10 DIAGNOSIS — I63.9 CEREBROVASCULAR ACCIDENT (CVA), UNSPECIFIED MECHANISM (HCC): ICD-10-CM

## 2024-10-10 PROCEDURE — 92507 TX SP LANG VOICE COMM INDIV: CPT

## 2024-10-10 PROCEDURE — 97165 OT EVAL LOW COMPLEX 30 MIN: CPT

## 2024-10-10 PROCEDURE — 97530 THERAPEUTIC ACTIVITIES: CPT

## 2024-10-10 NOTE — PROGRESS NOTES
Daily Speech Treatment Note    Today's date: 10/10/2024   Patient’s name: Carolina Allison  : 1975  MRN: [id number]  Safety measures:   Referring provider: Yovani Rudolph MD    Encounter Diagnosis     ICD-10-CM    1. Other symbolic dysfunctions  R48.8       2. Cognitive deficits  R41.89       3. Cerebrovascular accident (CVA), unspecified mechanism (HCC)  I63.9           Visit Tracking:  POC expires Unit limit Auth Expiration date PT/OT + Visit Limit?   24 No cog tx codes 3/2/2025 52 PT, OT, ST combined                           Visit/Unit Tracking  AUTH Status:  Date   IE 9/10 9/12 9/17 9/19 10/1 10/3  RE 10/7 10/10      yes Used 1 2 3 4 5 6 7 8 1       Remaining  7 6 5 4 3 2 1 0         Subjective/Behavioral:  Patient reports she continues to struggle in noisy environments with conversations and attention/concentration.    Objective/Assessment:      Short-term goals: (to be achieved in 6-8 weeks)    Patient will be educated on word finding strategies (i.e., circumlocution) for improved generative naming and verbal expression skills.     Patient will facilitate planning by completing thought organization tasks (e.g., sequencing, deduction puzzles, etc.) with 80% accuracy to facilitate increased executive functioning, working memory, problem solving, and processing skills     IM session #: 2    ACTIVITY SPECIFICS & ADAPTATIONS: headphones, seated, both hands    TEMPO (SPEED): 62  lower = slower processing & movement  higher = faster processing & movement    DIFFICULTY: 200  auto = advanced  100ms = moderate complexity  200-300ms = easiest    INTENSITY SETTIN  burst setting = 2 (easiest) to 15 (hardest)    TASK DURATION: 1.8/mins  sustained concentration & stamina     MS AVERAGE (TASK SCORE) % SRO (TARGET RESPONSES): SRO HITS/ HIGHEST IN-A-ROW: BURST SCORE:   Task 1  auditory mode only without guide sounds 25 60 10 8   Task 2  visual mode: score without center flash with itnteractive game 30 53  10 4     Key:  MS AVERAGE (TASK SCORE): lower score = better performance    % SRO (TARGET RESPONSES): higher % = better performance    SRO HITS/HIGHEST IN-A-ROW: higher score = better performance    BURST SCORE: higher score = better performance    Provided biofeedback of completion of tasks to create awareness of deficits/strengths. Provided education of progression and different strategies to reduce stressors around cognitive functioning (ex. Journaling stressors, self-advocacy).      Patient will complete auditory immediate and short term memory tasks to 80% accuracy to facilitate increased ability to retell narratives and recall information within functional living environment      To target mental manipulation and working memory, patient will participate in word finding activity (i.e., anagrams) with 80% accuracy      Patient will answer questions regarding story read aloud with 80% accuracy to facilitate improved auditory comprehension and recall    Patient will demonstrate divided/sustained/alternating/auditory attention tasks by responding to multiple tasks or details within tasks at the same time with min cues in a distracting environment with 80% accuracy  Plan:  -Patient was provided with home exercises/activities to target goals in plan of care at the end of today's session.  -Continue with current plan of care.

## 2024-10-10 NOTE — PROGRESS NOTES
OCCUPATIONAL THERAPY INITIAL EVALUATION      POC expires Auth Status Total   Visits  Start date  Expiration date PT/OT + Visit Limit? Co-Insurance   24 BOMN  10/10/24  52 per year for all therapies                                             Visit/Unit Tracking  AUTH Status: BOMN Date 10/10              Visits  Authed:  Used 1 (IE)               Remaining                  PLAN OF CARE START:10/10/24  PLAN OF CARE END: 2024  PROGRESS NOTE DUE: follow up post scheduling   FREQUENCY: 1-2x/week for 6-8 weeks  PRECAUTIONS: none  DIAGNOSIS: CVA (2024), R side weakness   VISITS: 1 (IE)      Today's date: 10/10/2024  Patient name: Carolina Allison  : 1975  MRN: [id number]  Referring provider: Yovani Rudolph MD  Dx:   Encounter Diagnosis     ICD-10-CM    1. Right sided weakness  R53.1 Ambulatory Referral to Occupational Therapy      2. Bell's palsy  G51.0 Ambulatory Referral to Occupational Therapy              SKILLED ANALYSIS:  Pt is a right hand dominant 49 year old female presenting to OP OT s/p hx of stroke in 2024 with R side weakness.  Pt is independent with ADLs with IADLs, requires occasional increased time. Pt currently reports difficulty with writing, R UE strength including opening jars, and fatigue in R UE from meal prep (ie. Cutting veggies). Pt reports difficulty with texting and typing at this time, however has just begun to trial typing again.  Pt is demonstrating deficits based on clinical observation and the following assessments: AGNES  strength, 2/3/lateral pinch strength, 9 hole peg test, functional dexterity test. Post assessments pt demonstrating the following: impaired R UE  and pinch strength as well as impaired coordination skills. Recommend OP OT 1-2x/wk for 6-8 weeks with focus on aforementioned deficits to maximize functional performance and improve QOL.  Findings and recommendations discussed with pt, and they are in agreement. Educated pt on  charges of insurance, assessments performed with standardized norms, POC, goal creation, and OP OT services. Provided with pre writing, tracing, and copying HEPs for handwriting practice.     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 female who was referred to Occupational Therapy s/p  Right sided weakness [R53.1]. Pt has hx of stroke with R side weakness. Has hx of [place] Palsy (2 bouts total). Hx of stroke on . Pt was discharged to home health and now presents to OP OT. Pt is currently in OP SLP services. Was recently discharged from OP PT.      PMH:   Past Medical History:   Diagnosis Date    Anemia     Anesthesia     Cardiac event    Asthma     Bell's palsy     COVID-19 10/20/2020    Diabetes mellitus (HCC)     borderline-diet controlled    Diabetes mellitus during pregnancy     Disease of thyroid gland     Hashimoto's disease     Migraine        Past Surgical Hx:   Past Surgical History:   Procedure Laterality Date     SECTION      HYSTEROSCOPY      with resection of intrauterine septum    [place] HYSTEROSCOPY BX ENDOMETRIUM&/POLYPC W/WO D&C N/A 2023    Procedure: DILATATION AND CURETTAGE (D&C) WITH HYSTEROSCOPY;  Surgeon: Dunia Sarmiento MD;  Location: [facility];  Service: Gynecology    LA LAPS TOTAL HYSTERECT 250 GM/< W/RMVL TUBE/OVARY Bilateral 2023    Procedure: HYSTERECTOMY LAPAROSCOPIC TOTAL WITH BILATERAL SALPINGO-OOPHERECTOMY;  Surgeon: Kailey Aguirre MD;  Location: [facility];  Service: Gynecology    US GUIDED THYROID BIOPSY  6/13/2019    US GUIDED THYROID BIOPSY  9/19/2019          Objective  Impairment Observations:    R UE Status on IE:10/10/24 L UE Status on IE: 10/10/24 Comments           UPPER EXTREMITY FUNCTION   Impaired Intact Dominant Hand: R UE     /PINCH STRENGTH             Dynamometer    - Gross Grasp  abnormal - R UE norm: 62lbs 34 lbs 55 lbs    Pinch Meter     - Pincer  abnormal - R UE norm: 13lbs 8 lbs 9 lbs     - Tripod  abnormal - R UE norm: 17lbs 6 lbs 11 lbs     - Lateral  abnormal - R UE norm: 17lbs 11 lbs  12 lbs      COORDINATION      9 Hole Peg Test-  assesses dexterity/fine motor coordination     abnormal - R UE norm: 18 seconds   24 seconds 24 seconds        Fxnl Dexterity Test-assesses patient's ability to use the hand for daily tasks requiring a 3-jaw jose r prehension between the fingers and the thumb    abnormal - R UE norm: 22 seconds   29 seconds 32 seconds          SHORT TERM GOALS (4-6 weeks)    GOAL  STATUS ON IE  GOAL STATUS    Pt will increase R UE  strength by 2 lbs to complete work roles and IADLs 34lbs Established      Pt will increase  R UE 2 point pinch strength by 2lbs to complete functional ADLs and IADLs with increased independence   8lbs Established      Pt will increase  R UE 3 point pinch strength by 2lbs to complete functional ADLs and IADLs with increased independence   6lbs Established      Pt will increase  R UE lateral  pinch strength by 2lbs to complete functional ADLs and IADLs with increased independence   11lbs Established      Pt will demonstrate improved in hand manipulation skills as evidenced by performing functional dexterity test with R UE in 27 seconds or less in order to increase independence with work related roles 29 seconds Established      LONG TERM GOALS( 6-8 weeks)    GOAL  STATUS ON IE  GOAL STATUS    Pt will increase R UE  strength by 4 lbs to complete work roles and IADLs 24lbs Established       Pt will increase  R UE 3 point pinch strength by 4lbs to complete functional ADLs and IADLs with increased independence   6lbs Established      Pt will increase  R UE lateral  pinch strength by 3lbs to complete functional ADLs and IADLs with increased independence   11lbs Established      Pt will demonstrate improved FMC as evidenced by performing 9 hole peg test with R UE in 21 seconds or less in order to increase independence with work related roles 24lbs Established      Pt will demonstrate improved in hand manipulation skills as evidenced by performing functional dexterity test with R UE in 25 seconds or less in order to increase independence with work related roles 29 seconds Established      Pt will demo with G carryover of Home Exercise Program to improve functional progression towards goals in plan of care and for improved functional use of UE  Established          OTHER PLANNED THERAPY INTERVENTIONS:   Supine, seated, and in stance neuro re-ed  Tricep AG  NMES/FES  FMC/prehension  Timed Trials  Manual tx  Hand to target  Sensory re-ed  Seated functional reach: crossing midline  Supine place and hold  WBearing strategies   Closed chain activities  Open chain activities  Internal and external memory aides  Multimatrix for saccades/ visual clutter/attention  Hypersensitivity strategies education  Multi-modal environment  Sustained/alternating/divided attention  Tracking tube  Oculomotor control:  saccades, con/divergence  Conv./div. Dynamic tasks  Work stations with timed transitions  Temporal Awareness  Memory and mental manipulation  Auditory processing with immediate recall  Memory retention with immediate and delayed recall  Edu on cog/vision apps

## 2024-10-11 ENCOUNTER — APPOINTMENT (OUTPATIENT)
Dept: LAB | Facility: HOSPITAL | Age: 49
End: 2024-10-11
Payer: COMMERCIAL

## 2024-10-11 DIAGNOSIS — E11.9 TYPE 2 DIABETES MELLITUS WITHOUT COMPLICATION, WITHOUT LONG-TERM CURRENT USE OF INSULIN (HCC): ICD-10-CM

## 2024-10-11 LAB
ALBUMIN SERPL BCG-MCNC: 4 G/DL (ref 3.5–5)
ALP SERPL-CCNC: 85 U/L (ref 34–104)
ALT SERPL W P-5'-P-CCNC: 18 U/L (ref 7–52)
ANION GAP SERPL CALCULATED.3IONS-SCNC: 6 MMOL/L (ref 4–13)
AST SERPL W P-5'-P-CCNC: 12 U/L (ref 13–39)
BASOPHILS # BLD AUTO: 0.02 THOUSANDS/ΜL (ref 0–0.1)
BASOPHILS NFR BLD AUTO: 0 % (ref 0–1)
BILIRUB SERPL-MCNC: 0.97 MG/DL (ref 0.2–1)
BUN SERPL-MCNC: 15 MG/DL (ref 5–25)
CALCIUM SERPL-MCNC: 9.2 MG/DL (ref 8.4–10.2)
CHLORIDE SERPL-SCNC: 104 MMOL/L (ref 96–108)
CHOLEST SERPL-MCNC: 192 MG/DL
CO2 SERPL-SCNC: 28 MMOL/L (ref 21–32)
CREAT SERPL-MCNC: 0.69 MG/DL (ref 0.6–1.3)
CREAT UR-MCNC: 78.7 MG/DL
EOSINOPHIL # BLD AUTO: 0.24 THOUSAND/ΜL (ref 0–0.61)
EOSINOPHIL NFR BLD AUTO: 4 % (ref 0–6)
ERYTHROCYTE [DISTWIDTH] IN BLOOD BY AUTOMATED COUNT: 12.4 % (ref 11.6–15.1)
EST. AVERAGE GLUCOSE BLD GHB EST-MCNC: 163 MG/DL
GFR SERPL CREATININE-BSD FRML MDRD: 103 ML/MIN/1.73SQ M
GLUCOSE P FAST SERPL-MCNC: 187 MG/DL (ref 65–99)
HBA1C MFR BLD: 7.3 %
HCT VFR BLD AUTO: 38.4 % (ref 34.8–46.1)
HDLC SERPL-MCNC: 58 MG/DL
HGB BLD-MCNC: 12.6 G/DL (ref 11.5–15.4)
IMM GRANULOCYTES # BLD AUTO: 0.02 THOUSAND/UL (ref 0–0.2)
IMM GRANULOCYTES NFR BLD AUTO: 0 % (ref 0–2)
LDLC SERPL CALC-MCNC: 112 MG/DL (ref 0–100)
LYMPHOCYTES # BLD AUTO: 1.66 THOUSANDS/ΜL (ref 0.6–4.47)
LYMPHOCYTES NFR BLD AUTO: 29 % (ref 14–44)
MCH RBC QN AUTO: 30.2 PG (ref 26.8–34.3)
MCHC RBC AUTO-ENTMCNC: 32.8 G/DL (ref 31.4–37.4)
MCV RBC AUTO: 92 FL (ref 82–98)
MICROALBUMIN UR-MCNC: <7 MG/L
MONOCYTES # BLD AUTO: 0.39 THOUSAND/ΜL (ref 0.17–1.22)
MONOCYTES NFR BLD AUTO: 7 % (ref 4–12)
NEUTROPHILS # BLD AUTO: 3.35 THOUSANDS/ΜL (ref 1.85–7.62)
NEUTS SEG NFR BLD AUTO: 60 % (ref 43–75)
NRBC BLD AUTO-RTO: 0 /100 WBCS
PLATELET # BLD AUTO: 211 THOUSANDS/UL (ref 149–390)
PMV BLD AUTO: 11.3 FL (ref 8.9–12.7)
POTASSIUM SERPL-SCNC: 4.3 MMOL/L (ref 3.5–5.3)
PROT SERPL-MCNC: 6.7 G/DL (ref 6.4–8.4)
RBC # BLD AUTO: 4.17 MILLION/UL (ref 3.81–5.12)
SODIUM SERPL-SCNC: 138 MMOL/L (ref 135–147)
TRIGL SERPL-MCNC: 111 MG/DL
TSH SERPL DL<=0.05 MIU/L-ACNC: 3.55 UIU/ML (ref 0.45–4.5)
WBC # BLD AUTO: 5.68 THOUSAND/UL (ref 4.31–10.16)

## 2024-10-11 PROCEDURE — 82570 ASSAY OF URINE CREATININE: CPT

## 2024-10-11 PROCEDURE — 80053 COMPREHEN METABOLIC PANEL: CPT

## 2024-10-11 PROCEDURE — 36415 COLL VENOUS BLD VENIPUNCTURE: CPT

## 2024-10-11 PROCEDURE — 82043 UR ALBUMIN QUANTITATIVE: CPT

## 2024-10-11 PROCEDURE — 84443 ASSAY THYROID STIM HORMONE: CPT

## 2024-10-11 PROCEDURE — 83036 HEMOGLOBIN GLYCOSYLATED A1C: CPT

## 2024-10-11 PROCEDURE — 80061 LIPID PANEL: CPT

## 2024-10-11 PROCEDURE — 85025 COMPLETE CBC W/AUTO DIFF WBC: CPT

## 2024-10-14 ENCOUNTER — OFFICE VISIT (OUTPATIENT)
Age: 49
End: 2024-10-14
Payer: COMMERCIAL

## 2024-10-14 DIAGNOSIS — R53.1 RIGHT SIDED WEAKNESS: Primary | ICD-10-CM

## 2024-10-14 PROCEDURE — 97112 NEUROMUSCULAR REEDUCATION: CPT

## 2024-10-14 NOTE — PROGRESS NOTES
POC expires Auth Status Total   Visits  Start date  Expiration date PT/OT + Visit Limit? Co-Insurance   24 BOMN  10/10/24  52 per year for all therapies                                             Visit/Unit Tracking  AUTH Status: BOMN Date 10/10 10/14             Visits  Authed:  Used 1 (IE) 1              Remaining                  PLAN OF CARE START:10/10/24  PLAN OF CARE END: 2024  PROGRESS NOTE DUE: follow up post scheduling into November  FREQUENCY: 1-2x/week for 6-8 weeks  PRECAUTIONS: none  DIAGNOSIS: CVA (2024), R side weakness   VISITS: 2    Daily Note     Today's date: 10/14/2024  Patient name: Carolina Allison  : 1975  MRN: [id number]  Referring provider: Yovani Rudolph MD  Dx:   Encounter Diagnosis     ICD-10-CM    1. Right sided weakness  R53.1                          Subjective: Pt reports no changes since last visit       Objective: See treatment below.  NMRE:   *reach,grasp,release of small keyhole peg towel R side>vertical board using R UE 2 point pinch. Performed rotation of peg 3x prior to release. Removed using R UE 3 point pinch black resistive clothespin. Performed overhead reach prior to release of peg.     *green digi flex, 2 minutes focused on gross grasp    *red spider web grid, 2 minute focused on gross grasp    *reach,grasp,release of small coins towel>slotted bin using R UE 2 point pinch. Performed finger<>palmar translation prior to release. No dropping noted.     Assessment: Tolerated treatment well. 2# weight added to R UE for increased challenge to promote muscle recruitment and to provide proprioceptive input.  Focus of session on R UE  strength, coordination, in hand manipulation. Pt would benefit from continued OT services to address R UE coordination, in hand manipulation,  and pinch strength.       Plan: Continued skilled OT per POC.

## 2024-10-15 ENCOUNTER — APPOINTMENT (OUTPATIENT)
Age: 49
End: 2024-10-15
Payer: COMMERCIAL

## 2024-10-16 ENCOUNTER — OFFICE VISIT (OUTPATIENT)
Dept: INTERNAL MEDICINE CLINIC | Facility: CLINIC | Age: 49
End: 2024-10-16
Payer: COMMERCIAL

## 2024-10-16 VITALS
DIASTOLIC BLOOD PRESSURE: 92 MMHG | HEIGHT: 65 IN | RESPIRATION RATE: 16 BRPM | WEIGHT: 267.4 LBS | BODY MASS INDEX: 44.55 KG/M2 | SYSTOLIC BLOOD PRESSURE: 130 MMHG | OXYGEN SATURATION: 98 % | HEART RATE: 109 BPM

## 2024-10-16 DIAGNOSIS — F41.9 ANXIETY: ICD-10-CM

## 2024-10-16 DIAGNOSIS — Z12.31 ENCOUNTER FOR SCREENING MAMMOGRAM FOR MALIGNANT NEOPLASM OF BREAST: ICD-10-CM

## 2024-10-16 DIAGNOSIS — Z12.11 SCREENING FOR COLON CANCER: ICD-10-CM

## 2024-10-16 DIAGNOSIS — E11.9 TYPE 2 DIABETES MELLITUS WITHOUT COMPLICATION, WITHOUT LONG-TERM CURRENT USE OF INSULIN (HCC): Primary | ICD-10-CM

## 2024-10-16 PROCEDURE — 99214 OFFICE O/P EST MOD 30 MIN: CPT | Performed by: INTERNAL MEDICINE

## 2024-10-16 RX ORDER — BLOOD SUGAR DIAGNOSTIC
STRIP MISCELLANEOUS
Qty: 200 EACH | Refills: 3 | Status: SHIPPED | OUTPATIENT
Start: 2024-10-16

## 2024-10-16 RX ORDER — ESCITALOPRAM OXALATE 5 MG/1
5 TABLET ORAL DAILY
Qty: 30 TABLET | Refills: 5 | Status: SHIPPED | OUTPATIENT
Start: 2024-10-16

## 2024-10-16 NOTE — PROGRESS NOTES
Ambulatory Visit  Name: Carolina Allison      : 1975      MRN: [id number]  Encounter Provider: Yovani Rudolph MD  Encounter Date: 10/16/2024   Encounter department: [facility] [place]    She feels ready to return to work. Letter given.  Assessment & Plan  Type 2 diabetes mellitus without complication, without long-term current use of insulin (HCC)    Lab Results   Component Value Date    HGBA1C 7.3 (H) 10/11/2024       Orders:    glucose blood (OneTouch Ultra) test strip; DX: E11.9. Test blood sugar three times daily.    Continue metformin and lantus until she can get the mounjaro, then can stop lantus. She feels ready to return to work. Letter given.  Screening for colon cancer    Orders:    Ambulatory Referral to Gastroenterology; Future    Encounter for screening mammogram for malignant neoplasm of breast    Orders:    Mammo screening bilateral w 3d and cad; Future    Anxiety    Reports anxiety and stress, interested in medication, could help with tachycardia.   Orders:    escitalopram (LEXAPRO) 5 mg tablet; Take 1 tablet (5 mg total) by mouth daily      Depression Screening and Follow-up Plan: Patient was screened for depression during today's encounter. They screened negative with a PHQ-2 score of 0.    History of Present Illness   Here for f/u.        History obtained from : patient  Review of Systems   Constitutional:  Negative for chills and fever.   HENT:  Negative for ear pain and sore throat.    Eyes:  Negative for pain and visual disturbance.   Respiratory:  Negative for cough and shortness of breath.    Cardiovascular:  Negative for chest pain and palpitations.   Gastrointestinal:  Negative for abdominal pain and vomiting.   Genitourinary:  Negative for dysuria and hematuria.   Musculoskeletal:  Negative for arthralgias and back pain.   Skin:  Negative for color change and rash.   Neurological:  Negative for seizures and syncope.   Psychiatric/Behavioral:  Positive for  dysphoric mood. The patient is nervous/anxious.    All other systems reviewed and are negative.    Past Medical History   Past Medical History:   Diagnosis Date    Anemia     Anesthesia     Cardiac event    Asthma     Bell's palsy     COVID-19 10/20/2020    Diabetes mellitus (HCC)     borderline-diet controlled    Diabetes mellitus during pregnancy     Disease of thyroid gland     Hashimoto's disease     Migraine      Past Surgical History:   Procedure Laterality Date     SECTION      HYSTEROSCOPY      with resection of intrauterine septum    [place] HYSTEROSCOPY BX ENDOMETRIUM&/POLYPC W/WO D&C N/A 2023    Procedure: DILATATION AND CURETTAGE (D&C) WITH HYSTEROSCOPY;  Surgeon: Dunia Sarmiento MD;  Location: [facility];  Service: Gynecology    [place] LAPS TOTAL HYSTERECT 250 GM/< W/RMVL TUBE/OVARY Bilateral 2023    Procedure: HYSTERECTOMY LAPAROSCOPIC TOTAL WITH BILATERAL SALPINGO-OOPHERECTOMY;  Surgeon: Kailey Aguirre MD;  Location: [facility];  Service: Gynecology    US GUIDED THYROID BIOPSY  2019    US GUIDED THYROID BIOPSY  2019     Family History   Problem Relation Age of Onset    Migraines Mother     Heart disease Father         cardiac disorder    Hyperlipidemia Father     Hypertension Father     Breast cancer Maternal Grandmother     Cervical cancer Maternal Grandmother     Ovarian cancer Paternal Grandmother     Cancer Paternal Grandmother     Cervical cancer Maternal Aunt     Endometrial cancer Maternal Aunt 20    Breast cancer Paternal Aunt     Endometrial cancer Paternal Aunt 30    Uterine cancer Paternal Aunt     Ovarian cancer Paternal Aunt     Colon cancer Neg Hx      Current Outpatient Medications on File Prior to Visit   Medication Sig Dispense Refill    acetaminophen (TYLENOL) 325 mg tablet Take 2 tablets (650 mg total) by mouth every 6 (six) hours as needed for mild pain  0    aspirin (ECOTRIN LOW STRENGTH) 81 mg EC tablet Take 81 mg by mouth daily      Blood  "Glucose Monitoring Suppl ([employer] Blood Glucose Meter) w/Device KIT Test blood sugar three times daily. 1 kit 2    glucose blood test strip Test blood sugar three times daily. 200 strip 5    ibuprofen (MOTRIN) 600 mg tablet Take 1 tablet (600 mg total) by mouth every 6 (six) hours as needed for moderate pain 30 tablet 0    insulin glargine (Lantus) 100 units/mL subcutaneous injection Inject 10 Units under the skin daily at bedtime 10 mL 0    Lancets (Glider[employer]io Unistik 2) MISC Test blood sugar three times daily. 200 each 5    levothyroxine 100 mcg tablet Take one tab po every other day (alt with 112 mcg) 90 tablet 0    levothyroxine 112 mcg tablet Take one tab po every other day (alt with 110 mcg) 90 tablet 0    lisinopril (ZESTRIL) 5 mg tablet Take 1 tablet (5 mg total) by mouth daily 90 tablet 3    metFORMIN (GLUCOPHAGE) 1000 MG tablet Take 1 tablet (1,000 mg total) by mouth 2 (two) times a day with meals 180 tablet 1    tirzepatide 2.5 MG/0.5ML Inject 0.5 mL (2.5 mg total) under the skin every 7 days       No current facility-administered medications on file prior to visit.     Allergies   Allergen Reactions    Iodinated Contrast Media Anaphylaxis     SOB, chest tightness, required epinephrine          Objective   /92 (BP Location: Left arm, Patient Position: Sitting, Cuff Size: Adult)   Pulse (!) 109   Resp 16   Ht 5' 5\" (1.651 m)   Wt 121 kg (267 lb 6.4 oz)   LMP 09/01/2023   SpO2 98%   BMI 44.50 kg/m²     Physical Exam  Vitals and nursing note reviewed.   Constitutional:       General: She is not in acute distress.     Appearance: She is well-developed. She is obese.   HENT:      Head: Normocephalic.   Cardiovascular:      Rate and Rhythm: Normal rate and regular rhythm.      Heart sounds: No murmur heard.  Pulmonary:      Effort: Pulmonary effort is normal. No respiratory distress.      Breath sounds: Normal breath sounds.   Abdominal:      Tenderness: There is no abdominal tenderness. "   Musculoskeletal:         General: No swelling.      Cervical back: Neck supple.   Skin:     General: Skin is warm and dry.      Capillary Refill: Capillary refill takes less than 2 seconds.   Neurological:      Mental Status: She is alert.   Psychiatric:         Mood and Affect: Mood normal.         I have spent a total time of 15 minutes in caring for this patient on the day of the visit/encounter including Instructions for management, Importance of tx compliance, Impressions, Reviewing / ordering tests, medicine, procedures  , and Obtaining or reviewing history  .

## 2024-10-16 NOTE — ASSESSMENT & PLAN NOTE
Lab Results   Component Value Date    HGBA1C 7.3 (H) 10/11/2024       Orders:    glucose blood (OneTouch Ultra) test strip; DX: E11.9. Test blood sugar three times daily.    Continue metformin and lantus until she can get the mounjaro, then can stop lantus. She feels ready to return to work. Letter given.

## 2024-10-16 NOTE — LETTER
October 16, 2024     Patient: Carolina Allison  YOB: 1975  Date of Visit: 10/16/2024      To Whom it May Concern:    Carolina Allison is under my professional care. Carolina was seen in my office on 10/16/2024. Carolina is medically cleared to return to work on 10/21/24.    If you have any questions or concerns, please don't hesitate to call.         Sincerely,          Yovani Rudolph MD        CC: No Recipients

## 2024-10-17 ENCOUNTER — OFFICE VISIT (OUTPATIENT)
Age: 49
End: 2024-10-17
Payer: COMMERCIAL

## 2024-10-17 ENCOUNTER — APPOINTMENT (OUTPATIENT)
Age: 49
End: 2024-10-17
Payer: COMMERCIAL

## 2024-10-17 DIAGNOSIS — I63.9 CEREBROVASCULAR ACCIDENT (CVA), UNSPECIFIED MECHANISM (HCC): ICD-10-CM

## 2024-10-17 DIAGNOSIS — R41.89 COGNITIVE DEFICITS: ICD-10-CM

## 2024-10-17 DIAGNOSIS — R48.8 OTHER SYMBOLIC DYSFUNCTIONS: Primary | ICD-10-CM

## 2024-10-17 PROCEDURE — 92507 TX SP LANG VOICE COMM INDIV: CPT

## 2024-10-17 NOTE — PROGRESS NOTES
Daily Speech Treatment Note    Today's date: 10/17/2024   Patient’s name: Carolina Allison  : 1975  MRN: [id number]  Safety measures:   Referring provider: Yovani Rudolph MD    Encounter Diagnosis     ICD-10-CM    1. Other symbolic dysfunctions  R48.8       2. Cognitive deficits  R41.89       3. Cerebrovascular accident (CVA), unspecified mechanism (HCC)  I63.9             Visit Tracking:  POC expires Unit limit Auth Expiration date PT/OT + Visit Limit?   24 No cog tx codes 3/2/2025 52 PT, OT, ST combined                           Visit/Unit Tracking  AUTH Status:  Date   IE 9/10 9/12 9/17 9/19 10/1 10/3  RE 10/7 10/10 10/17     yes Used 1 2 3 4 5 6 7 8 1 2      Remaining  7 6 5 4 3 2 1 0 9 8       PT: 5  Speech: 12  OT: 2  Subjective/Behavioral:  Patient reports return to work 10/21. Reviewed modifications that are being placed for return. Recommended to contact her employer to determine if      Objective/Assessment:  Short-term goals: (to be achieved in 6-8 weeks)    Patient will be educated on word finding strategies (i.e., circumlocution) for improved generative naming and verbal expression skills.     Patient will facilitate planning by completing thought organization tasks (e.g., sequencing, deduction puzzles, etc.) with 80% accuracy to facilitate increased executive functioning, working memory, problem solving, and processing skills       Patient will complete auditory immediate and short term memory tasks to 80% accuracy to facilitate increased ability to retell narratives and recall information within functional living environment      To target mental manipulation and working memory, patient will participate in word finding activity (i.e., anagrams) with 80% accuracy    Word generation task: patient was given two target words and had to generate at least 20 different 3 letter words. Task completed in 35/40 opp (87% acc) independently, increasing to 40/40 opp (100% acc) from min  cueing.  Patient benefited from phonemic cues.      Patient will answer questions regarding story read aloud with 80% accuracy to facilitate improved auditory comprehension and recall    Patient will demonstrate divided/sustained/alternating/auditory attention tasks by responding to multiple tasks or details within tasks at the same time with min cues in a distracting environment with 80% accuracy   (Alternating Attention Math) patient was given 52 total math problems (4 rows of 13 equations) alternating subtraction, multiplication, addition with 2 digit. Time: 4:02 mins/secs mins. Completed Independently: 50 increasing to 52 from min verbal prompting. Patient tolerated task well.    Plan:  -Patient was provided with home exercises/activities to target goals in plan of care at the end of today's session.  -Continue with current plan of care.

## 2024-10-21 ENCOUNTER — APPOINTMENT (OUTPATIENT)
Age: 49
End: 2024-10-21
Payer: COMMERCIAL

## 2024-10-22 ENCOUNTER — OFFICE VISIT (OUTPATIENT)
Age: 49
End: 2024-10-22
Payer: COMMERCIAL

## 2024-10-22 DIAGNOSIS — R41.89 COGNITIVE DEFICITS: ICD-10-CM

## 2024-10-22 DIAGNOSIS — R48.8 OTHER SYMBOLIC DYSFUNCTIONS: Primary | ICD-10-CM

## 2024-10-22 DIAGNOSIS — I63.9 CEREBROVASCULAR ACCIDENT (CVA), UNSPECIFIED MECHANISM (HCC): ICD-10-CM

## 2024-10-22 PROCEDURE — 92507 TX SP LANG VOICE COMM INDIV: CPT

## 2024-10-22 NOTE — PROGRESS NOTES
Daily Speech Treatment Note    Today's date: 10/22/2024   Patient’s name: Carolina Allison  : 1975  MRN: [id number]  Safety measures:   Referring provider: Yovani Rudolph MD    Encounter Diagnosis     ICD-10-CM    1. Other symbolic dysfunctions  R48.8       2. Cognitive deficits  R41.89       3. Cerebrovascular accident (CVA), unspecified mechanism (HCC)  I63.9               Visit Tracking:  POC expires Unit limit Auth Expiration date PT/OT + Visit Limit?   24 No cog tx codes 3/2/2025 52 PT, OT, ST combined                           Visit/Unit Tracking  AUTH Status:  Date   IE 9/10 9/12 9/17 9/19 10/1 10/3  RE 10/7 10/10 10/17 10/22    yes Used 1 2 3 4 5 6 7 8 1 2 3     Remaining  7 6 5 4 3 2 1 0 9 8 7      PT: 5  Speech: 11  OT: 2  Subjective/Behavioral: Patient reports increased fatigue from return to work on 10/21. Patient reports increased sensitivities to bright colors and difficulties with attention at work.    Objective/Assessment:  Short-term goals: (to be achieved in 6-8 weeks)    Patient will be educated on word finding strategies (i.e., circumlocution) for improved generative naming and verbal expression skills.     Patient will facilitate planning by completing thought organization tasks (e.g., sequencing, deduction puzzles, etc.) with 80% accuracy to facilitate increased executive functioning, working memory, problem solving, and processing skills     Patient was given deduction puzzles with 9 clues and completed with 100% accuracy min verbal cueing. Patient benefited from repetition of directions. Puzzle with 7 clues, patient completed 100% accuracy independently! Tasks were completed in timely manner.    Patient will complete auditory immediate and short term memory tasks to 80% accuracy to facilitate increased ability to retell narratives and recall information within functional living environment      To target mental manipulation and working memory, patient will participate in  word finding activity (i.e., anagrams) with 80% accuracy    Patient was given a letter chain and had to generate at least 20 different 3 or more letter words. Task completed in 17/20 opp (85% acc) independently, increasing to 20/20 opp (100% acc) from mod-max cueing. Patient benefited from written/phonemic/semantic cues. Completed in timely manner.      Patient to provide the general category that completes the list of general category -> subcategory->item in that category. (Ex colors-> primary colors-> red)Task completed in 12/20 opp (60% acc) independently, increasing to 20/20 opp (100% acc) from min-mod cueing. Patient benefited from carrier phrases and semantic cues.      Patient will answer questions regarding story read aloud with 80% accuracy to facilitate improved auditory comprehension and recall    Patient will demonstrate divided/sustained/alternating/auditory attention tasks by responding to multiple tasks or details within tasks at the same time with min cues in a distracting environment with 80% accuracy    Plan:  -Patient was provided with home exercises/activities to target goals in plan of care at the end of today's session.  -Continue with current plan of care.

## 2024-10-28 ENCOUNTER — OFFICE VISIT (OUTPATIENT)
Age: 49
End: 2024-10-28
Payer: COMMERCIAL

## 2024-10-28 DIAGNOSIS — R53.1 RIGHT SIDED WEAKNESS: Primary | ICD-10-CM

## 2024-10-28 PROCEDURE — 97112 NEUROMUSCULAR REEDUCATION: CPT

## 2024-10-28 NOTE — PROGRESS NOTES
POC expires Auth Status Total   Visits  Start date  Expiration date PT/OT + Visit Limit? Co-Insurance   24 BOMN  10/10/24  52 per year for all therapies                                             Visit/Unit Tracking  AUTH Status: BOMN Date 10/10 10/14 10/28            Visits  Authed:  Used 1 (IE) 1 1             Remaining                  PLAN OF CARE START:10/10/24  PLAN OF CARE END: 2024  PROGRESS NOTE DUE: follow up post scheduling into November  FREQUENCY: 1-2x/week for 6-8 weeks  PRECAUTIONS: none  DIAGNOSIS: CVA (2024), R side weakness   VISITS: 3    Daily Note     Today's date: 10/28/2024  Patient name: Carolina Allison  : 1975  MRN: [id number]  Referring provider: Yovani Rudolph MD  Dx:   Encounter Diagnosis     ICD-10-CM    1. Right sided weakness  R53.1                            Subjective: Pt reports no changes since last visit. Pt reports she has been back at work for one week now and its going well. Reports she is getting back into work related typing and writing tasks.       Objective: See treatment below.  NMRE:   *Reach, grasp release with finger dexterity pins rotating pins 3x in hand prior to release for in hand for in hand manipulation and placing three small pegs in each slot. Pegs positioned on towel on R side of pt utilizing two point pinch for grasp. Removed using R UE 3 point pinch black resistive clothespin. Performed overhead reach prior to release of peg.     *Green digi flex, 2 minutes focused on composite flexion for gross grasp and motor recovery. Performed two minutes of isolated finger flexion with red digi-flex focusing on digit strength, endurance, and motor recovery for work related tasks.     *Completed green flexbar supination/pronation bends 2 sets x20 reps focusing on UE gross grasp strength and motor recovery.     *Completed green flexbar towel twists stabilizing with LUE and utilizing RUE for flexion/extension for 2 sets x20 reps focusing  on sustained  strength of RUE.     *Completed reach, graps, release of perler activity on R side utilizing three point pinch of RUE on tweezers to place beads on flower pattern for functional pinch. 2lb wrist wt added to RUE for increased challenge to promote muscle recruitment and to provide proprioceptive input. 20% of drops observed.         Assessment: Tolerated treatment well. Pt continues to demonstrate increased FMC and  strength with RUE. 2# weight added to R UE for increased challenge to promote muscle recruitment and to provide proprioceptive input. Focus of session on R UE  strength, pinch strength coordination, in hand manipulation. Pt would benefit from continued OT services to address R UE coordination, in hand manipulation,  and pinch strength.       Plan: Continued skilled OT per POC.

## 2024-10-29 ENCOUNTER — OFFICE VISIT (OUTPATIENT)
Age: 49
End: 2024-10-29
Payer: COMMERCIAL

## 2024-10-29 DIAGNOSIS — I63.9 CEREBROVASCULAR ACCIDENT (CVA), UNSPECIFIED MECHANISM (HCC): ICD-10-CM

## 2024-10-29 DIAGNOSIS — R41.89 COGNITIVE DEFICITS: ICD-10-CM

## 2024-10-29 DIAGNOSIS — R48.8 OTHER SYMBOLIC DYSFUNCTIONS: Primary | ICD-10-CM

## 2024-10-29 PROCEDURE — 92507 TX SP LANG VOICE COMM INDIV: CPT

## 2024-10-29 NOTE — PROGRESS NOTES
Daily Speech Treatment Note    Today's date: 10/29/2024   Patient’s name: Carolina Allison  : 1975  MRN: [id number]  Safety measures:   Referring provider: Yovani Rudolph MD    Encounter Diagnosis     ICD-10-CM    1. Other symbolic dysfunctions  R48.8       2. Cognitive deficits  R41.89       3. Cerebrovascular accident (CVA), unspecified mechanism (HCC)  I63.9                 Visit Tracking:  POC expires Unit limit Auth Expiration date PT/OT + Visit Limit?   24 No cog tx codes 3/2/2025 52 PT, OT, ST combined                           Visit/Unit Tracking  AUTH Status:  Date   IE 9/10 9/12 9/17 9/19 10/1 10/3  RE 10/7 10/10 10/17 10/22 10/29   yes Used 1 2 3 4 5 6 7 8 1 2 3 4    Remaining  7 6 5 4 3 2 1 0 9 8 7 6     PT: 5  Speech: 13  OT: 3  Subjective/Behavioral: Patient reports back to work has been okay with given accommodations. Patient reports minor overstimulation to multiple speakers and colors. Patient reports self-advocacy of deficits and self-identification of errors at work!    Objective/Assessment:  Short-term goals: (to be achieved in 6-8 weeks)    Patient will be educated on word finding strategies (i.e., circumlocution) for improved generative naming and verbal expression skills.     Patient will facilitate planning by completing thought organization tasks (e.g., sequencing, deduction puzzles, etc.) with 80% accuracy to facilitate increased executive functioning, working memory, problem solving, and processing skills     Patient was given q-bitz more advanced puzzles. Task completed in 5/6 opp (83% acc) independently, increasing to 6/6 opp (100% acc) from min-mod cueing. Patient benefited from verbal prompting to correct errors.        Patient will complete auditory immediate and short term memory tasks to 80% accuracy to facilitate increased ability to retell narratives and recall information within functional living environment    Patient was given 10 word pairs associated with  location home coding (ex. Closet-coat). Utilizing verbal rehearsal strategy, clinician and patient reviewed list of pairs 4x. After review 4x, patient to independently generate both words in pair. Task completed in 10/10 opp (50% acc) independently!     Patient then was given a distractor tasks (see above). After completion of distractor task (approx. 20 mins later), patient was asked to recall the pair of words. Task completed in 8/10 opp (80% acc) independently, increasing to 10/10 opp (100% acc) from min cueing. Patient benefited from semantic cues.      To target mental manipulation and working memory, patient will participate in word finding activity (i.e., anagrams) with 80% accuracy      Patient will answer questions regarding story read aloud with 80% accuracy to facilitate improved auditory comprehension and recall    Patient will demonstrate divided/sustained/alternating/auditory attention tasks by responding to multiple tasks or details within tasks at the same time with min cues in a distracting environment with 80% accuracy    Plan:  -Patient was provided with home exercises/activities to target goals in plan of care at the end of today's session.  -Continue with current plan of care.

## 2024-10-31 ENCOUNTER — APPOINTMENT (OUTPATIENT)
Age: 49
End: 2024-10-31
Payer: COMMERCIAL

## 2024-11-04 ENCOUNTER — EVALUATION (OUTPATIENT)
Age: 49
End: 2024-11-04
Payer: COMMERCIAL

## 2024-11-04 ENCOUNTER — OFFICE VISIT (OUTPATIENT)
Age: 49
End: 2024-11-04
Payer: COMMERCIAL

## 2024-11-04 DIAGNOSIS — I63.9 CEREBROVASCULAR ACCIDENT (CVA), UNSPECIFIED MECHANISM (HCC): ICD-10-CM

## 2024-11-04 DIAGNOSIS — G51.0 BELL'S PALSY: ICD-10-CM

## 2024-11-04 DIAGNOSIS — R48.8 OTHER SYMBOLIC DYSFUNCTIONS: Primary | ICD-10-CM

## 2024-11-04 DIAGNOSIS — R53.1 RIGHT SIDED WEAKNESS: Primary | ICD-10-CM

## 2024-11-04 DIAGNOSIS — R41.89 COGNITIVE DEFICITS: ICD-10-CM

## 2024-11-04 PROCEDURE — 92507 TX SP LANG VOICE COMM INDIV: CPT

## 2024-11-04 PROCEDURE — 97112 NEUROMUSCULAR REEDUCATION: CPT

## 2024-11-04 NOTE — PROGRESS NOTES
POC expires Auth Status Total   Visits  Start date  Expiration date PT/OT + Visit Limit? Co-Insurance   24 BOMN  10/10/24  52 per year for all therapies                                             Visit/Unit Tracking  AUTH Status: BOMN Date 10/10 10/14 10/28 11/4           Visits  Authed:  Used 1 (IE) 1 1 1            Remaining                  PLAN OF CARE START: 10/10/24  PLAN OF CARE END: 2024  PROGRESS NOTE DUE: 24 (PN on schedule)  FREQUENCY: 1-2x/week for 6-8 weeks  PRECAUTIONS: none  DIAGNOSIS: CVA (2024), R side weakness   VISITS: 4    Daily Note     Today's date: 2024  Patient name: Carolina Allison  : 1975  MRN: [id number]  Referring provider: Yovani Rudolph MD  Dx:   Encounter Diagnosis     ICD-10-CM    1. Right sided weakness  R53.1       2. Bell's palsy  G51.0                            Subjective: Pt reports no changes since last visit. Reports she has been back at work and it is going well.       Objective: See treatment below.      NMRE:   *Reach, grasp, release of large cylindrical pegs with resistive hand exerciser into large pegboard. Emphasis on R UE FMC, in hand manipulation, and target accuracy. 2 lb wrist weight added to R UE to promote muscle recruitment and for increased proprioceptive input to R side. Upgraded to removing with green pincers.     *Reach, grasp, release of small 1 inch colored blocks with blue pnkster from R side of table>L side target. Patrick added as visual cue to facilitate proximal ROM/strength. Pt demo G sustained grasp with minimal drops or target misses.     *Green digi flex, 2 minutes focused on composite flexion for gross grasp and motor recovery.     *Completed green flexbar supination/pronation bends with 1-2 seconds holds, completed for 2 minute reps focusing on UE gross grasp strength and motor recovery.     *Completed green flexbar twists stabilizing with LUE and utilizing RUE for flexion/extension for 2 sets x20  reps focusing on sustained  strength of RUE.      Assessment: Tolerated treatment well. 2# weight added to R UE for increased challenge to promote muscle recruitment and to provide proprioceptive input. Focus of session on R UE  strength, fine motor coordination, and in hand manipulation. Pt demo G in hand manipulation skills and endurance for grasp and release tasks throughout session. Pt would benefit from continued OT services to address R UE coordination, in hand manipulation,  and pinch strength.     Plan: Continued skilled OT per POC.

## 2024-11-04 NOTE — PROGRESS NOTES
Speech-Language Pathology Progress Update    Today's date: 2024   Patient’s name: Carolina Allison  : 1975  MRN: [id number]  Safety measures:   Referring provider: Yovani Rudolph MD    Encounter Diagnosis     ICD-10-CM    1. Other symbolic dysfunctions  R48.8       2. Cognitive deficits  R41.89       3. Cerebrovascular accident (CVA), unspecified mechanism (HCC)  I63.9           Assessment:   continues to present with mild cognitive-linguistic deficits c//b decreased processing speed, multi-tasking decreased concentration/attention, verbal fluency,  delayed recall of locations/information, and organization of thoughts. Clinician and patient continue to be in agreement progression has been made thus far, however, PLOF has not been achieved. PLOF is important for patient due to her employment.Patient continues to be  motivated for OP ST services by having consistent attendance and completing home exercises. Patient would continue to benefit from outpatient skilled Speech Therapy services to maximize cognitive-linguistic skills after acute CVA to return to PLOF. Will continue  2x week for 45 mins sessions to maximize acute healing phase of recovery. OP ST services will target increased functional recall, executive functioning (processing, problem-solving, thought organization, etc.),  Interactive Metronome,successful completion of daily tasks and structured tasks, follow directions within functional activities and unstructured tasks, support positive communication interactions with both familiar and unfamiliar listeners, promote safety and facilitate overall improved quality of life.      Short-term goals: (to be achieved in 6-8 weeks)    Patient will be educated on word finding strategies (i.e., circumlocution) for improved generative naming and verbal expression skills. MET    Patient will facilitate planning by completing thought organization tasks (e.g., sequencing, deduction puzzles, etc.) with 80%  accuracy to facilitate increased executive functioning, working memory, problem solving, and processing skills ONGOING/PARTIALLY MET    Patient will complete auditory immediate and short term memory tasks to 80% accuracy to facilitate increased ability to retell narratives and recall information within functional living environmentONGOING/PARTIALLY MET    To target mental manipulation and working memory, patient will participate in word finding activity (i.e., anagrams) with 80% accuracyONGOING/PARTIALLY MET    Patient will answer questions regarding story read aloud with 80% accuracy to facilitate improved auditory comprehension and recallONGOING/PARTIALLY MET    Patient will demonstrate divided/sustained/alternating/auditory attention tasks by responding to multiple tasks or details within tasks at the same time with min cues in a distracting environment with 80% accuracyONGOING/PARTIALLY MET    Long Term Goals    Patient will demonstrate cognitive-communication skills consistent with age and education given use of compensatory strategies when needed to resume baseline activities and responsibilities in home, community, and work/school settings by discharge. ONGOING/PARTIALLY MET    Patient will complete cognitive-linguistic therapy that addresses patient's specific deficits in processing speed, short-term working memory, attention to detail, monitoring, sequencing, and organization skills, with instruction, to alleviate effects of executive functioning disorder deficits by discharge. ONGOING/PARTIALLY MET    Patient will complete higher-level expressive language tasks (e.g., word definitions, idioms, synonym/antonyms, etc) with 80% accuracy to improve functional communication skills by discharge. ONGOING/PARTIALLY MET      Plan:  Patient would CONTINUED benefit from outpatient skilled Speech Therapy services: Cognitive-linguistic therapy & Neurology    Frequency: 2x weekly  Duration: 3 months    Intervention  "certification from: 9/4/2024  Intervention certification to: 12/4/2024    Subjective:  Patient reports she is getting better with sustained attention. Patient reports immediate and delayed memory are still impacting her occupation. For example, asking repetition of dates. Patient reports bright colors and a lot of information delay her processing speed. Patient reports for extended periods of time at work gets overwhelming.    Patient's goal(s):  \"to return to baseline cognitive status\"      Objective (testing):  No testing today serves as progress update.      Treatment:  Patient was educated on word-finding strategies such as the Semantic Feature Analysis (SFA). Provided patient a handout of SFA and provided verbal model of how to utilize SFA. Provided education of the purpose of SFA which is to aid with word-finding and decrease communication breakdowns. Provided education to utilize SFA strategy in instances of anomia. Reciprocal comprehension noted. Trialled 3 office supplies utilizing SFA, patient completed SFA strategy with 100% accuracy min cueing. Patient benefited from verbal models and prompting to utilize specific descriptors.    Completed Intangible item descriptors verbally: Task completed in 6/8 opp (75% acc) independently, increasing to 8/8 opp (100% acc) from min-mod cueing. Patient benefited from semantic cues. Patient then was given the intangible item descriptions and had to generate the appropriate word per description. Task completed in 5/7 opp (71% acc) independently, increasing to 7/7 opp (100% acc) from min-mod cueing. Patient benefited from phonemic cues.        Visit Tracking:  POC expires Unit limit Auth Expiration date PT/OT + Visit Limit?   12/4/24 No cog tx codes 3/2/2025 52 PT, OT, ST combined                           Visit/Unit Tracking  AUTH Status:  Date 9/4  IE 9/10 9/12 9/17 9/19 10/1 10/3  RE 10/7 10/10 10/17 10/22 10/29 11/4   yes Used 1 2 3 4 5 6 7 8 1 2 3 4 5    Remaining  7 " 6 5 4 3 2 1 0 9 8 7 6 5     PT: 5  Speech: 13  OT: 4    Intervention comments:  45 mins of speech language tx

## 2024-11-07 ENCOUNTER — OFFICE VISIT (OUTPATIENT)
Age: 49
End: 2024-11-07
Payer: COMMERCIAL

## 2024-11-07 DIAGNOSIS — I63.9 CEREBROVASCULAR ACCIDENT (CVA), UNSPECIFIED MECHANISM (HCC): ICD-10-CM

## 2024-11-07 DIAGNOSIS — R48.8 OTHER SYMBOLIC DYSFUNCTIONS: Primary | ICD-10-CM

## 2024-11-07 DIAGNOSIS — R41.89 COGNITIVE DEFICITS: ICD-10-CM

## 2024-11-07 PROCEDURE — 92507 TX SP LANG VOICE COMM INDIV: CPT | Performed by: SPEECH-LANGUAGE PATHOLOGIST

## 2024-11-07 NOTE — PROGRESS NOTES
"Speech Treatment Note    Today's date: 2024  Patient name: Carolina Allison  : 1975  MRN: [id number]  Referring provider: Yovani Rudolph MD  Dx:   Encounter Diagnosis     ICD-10-CM    1. Other symbolic dysfunctions  R48.8       2. Cognitive deficits  R41.89       3. Cerebrovascular accident (CVA), unspecified mechanism (HCC)  I63.9               Visit Tracking:  POC expires Unit limit Auth Expiration date PT/OT + Visit Limit?   24 No cog tx codes 3/2/2025 52 PT, OT, ST combined                           Visit/Unit Tracking  AUTH Status:  Date   IE 9/10 9/12 9/17 9/19 10/1 10/3  RE 10/7 10/10 10/17 10/22 10/29 11/4 11/7   yes Used 1 2 3 4 5 6 7 8 1 2 3 4 5 6    Remaining  7 6 5 4 3 2 1 0 9 8 7 6 5 4     PT: 5  Speech: 14  OT: 4       Subjective:  Reports she made a mistake at work- forgot to do something she initiated last year.     Short-term goals: (to be achieved in 6-8 weeks)    Patient will facilitate planning by completing thought organization tasks (e.g., sequencing, deduction puzzles, etc.) with 80% accuracy to facilitate increased executive functioning, working memory, problem solving, and processing skills     Patient was asked to participate in Patient was asked to participate in recall activity targeting working memory, attention, and processing. This was completed via card game entitled \"golf.\" The patient was provided with detailed instructions. She were then asked to utilize memory strategies to assist in recall of cards throughout the game. The patient demonstrated excellent carryover of strategies for recall throughout game without errors.     Patient will complete auditory immediate and short term memory tasks to 80% accuracy to facilitate increased ability to retell narratives and recall information within functional living environment    Education was provided on memory strategies ( with handout provided) and then the patient was asked to apply the during various treatment " tasks.     First, the patient was given 8 shapes and asked to recall these shapes first without strategy use. Once removed from line of sight the patient was able to provide 3 out of 8 independently improving to 8 out of 8 given min cues. Ongoing discussion re: memory strategies and their implementation was completed and the patient was then asked to complete the task again implementing these. She were able to then provide 8 out of 8 independently utilizing strategies we discussed.     The patient was then provided with a list of 7 words and asked to recall. Once removed from line of sight the patient was able to recall 6 out of 7 words independently improving to 6 out of 7 given min cues. Ongoing discussion re: memory strategies and their implementation was completed and the patient was then asked to complete the task again implementing these. She were able to then provide  7 out of 7 independently.    The patient was then provided with 5 different faces and their associated names and asked to recall. Once removed from line of sight the patient was able to identify 5 out of 5 independently.    After an additional task was complete the patient was asked to recall the information from the above tasks again to target delayed memory. The patient was able to provide 8 out of 8 shapes; 7 out of 7 words and 5 out of 5 names independently.    Patient with excellent carryover/use of recall strategies throughout treatment tasks today.      To target mental manipulation and working memory, patient will participate in word finding activity (i.e., anagrams) with 80% accuracy    Patient will answer questions regarding story read aloud with 80% accuracy to facilitate improved auditory comprehension and recall    Patient will demonstrate divided/sustained/alternating/auditory attention tasks by responding to multiple tasks or details within tasks at the same time with min cues in a distracting environment with 80% accuracy  The  room door was left open throughout tasks noted above.      Other:Reviewed testing and plan of care with patient.Patient is in agreement with POC at this time.  Recommendations:Continue with Plan of Care    Danielle Bateman M.S., CCC-SLP  Speech Language Pathologist   Available via Secure Chat  [place] #[id number]  [place] #[id number]

## 2024-11-08 DIAGNOSIS — F41.9 ANXIETY: ICD-10-CM

## 2024-11-08 RX ORDER — ESCITALOPRAM OXALATE 5 MG/1
5 TABLET ORAL DAILY
Qty: 90 TABLET | Refills: 1 | Status: SHIPPED | OUTPATIENT
Start: 2024-11-08

## 2024-11-11 ENCOUNTER — OFFICE VISIT (OUTPATIENT)
Age: 49
End: 2024-11-11
Payer: COMMERCIAL

## 2024-11-11 DIAGNOSIS — R41.89 COGNITIVE DEFICITS: ICD-10-CM

## 2024-11-11 DIAGNOSIS — R53.1 RIGHT SIDED WEAKNESS: Primary | ICD-10-CM

## 2024-11-11 DIAGNOSIS — I63.9 CEREBROVASCULAR ACCIDENT (CVA), UNSPECIFIED MECHANISM (HCC): ICD-10-CM

## 2024-11-11 DIAGNOSIS — R48.8 OTHER SYMBOLIC DYSFUNCTIONS: Primary | ICD-10-CM

## 2024-11-11 PROCEDURE — 97112 NEUROMUSCULAR REEDUCATION: CPT

## 2024-11-11 PROCEDURE — 92507 TX SP LANG VOICE COMM INDIV: CPT

## 2024-11-11 NOTE — PROGRESS NOTES
POC expires Auth Status Total   Visits  Start date  Expiration date PT/OT + Visit Limit? Co-Insurance   24 BOMN  10/10/24  52 per year for all therapies                                             Visit/Unit Tracking  AUTH Status: BOMN Date 10/10 10/14 10/28 11/4 11/11          Visits  Authed:  Used 1 (IE) 1 1 1            Remaining                  PLAN OF CARE START: 10/10/24  PLAN OF CARE END: 2024  PROGRESS NOTE DUE: 24 (PN on schedule)  FREQUENCY: 1-2x/week for 6-8 weeks  PRECAUTIONS: none  DIAGNOSIS: CVA (2024), R side weakness   VISITS: 5    Daily Note     Today's date: 2024  Patient name: Carolina Allison  : 1975  MRN: [id number]  Referring provider: Yovani Rudolph MD  Dx:   Encounter Diagnosis     ICD-10-CM    1. Right sided weakness  R53.1           Start Time: 1500  Stop Time: 1545  Total time in clinic (min): 45 minutes        Subjective: Pt reports no changes since last visit.       Objective: See treatment below.    NMRE:   *Reach, grasp, release of [employer] pegboard pieces; upgraded to having patient turn peg 3x before placing. Emphasis on R UE FMC, in hand manipulation, and target accuracy. 2.5 lb wrist weight added to R UE to promote muscle recruitment and for increased proprioceptive input to R side. Upgraded to placing and removing with tweezers.     *Reach, grasp, release of cards onto drying rack with resistive pincers (red, black, blue, green), emphasis on bimanual coordination, sustained pinch, and ROM. Pt tolerating activity well with G task endurance, and sustained pinch noted.     Assessment: Tolerated treatment well. 2.5# weight added to R UE for increased challenge to promote muscle recruitment and to provide proprioceptive input. Focus of session on R UE pinch strength, fine motor coordination, and in hand manipulation skills needed for daily tasks. Pt continues to demo G task endurance throughout sessions. Pt would benefit from continued OT  services to address R UE coordination, in hand manipulation,  and pinch strength.     Plan: Continued skilled OT per POC.

## 2024-11-11 NOTE — PROGRESS NOTES
Daily Speech Treatment Note    Today's date: 2024   Patient’s name: Carolina Allison  : 1975  MRN: [id number]  Safety measures:   Referring provider: Yovani Rudolph MD    Encounter Diagnosis     ICD-10-CM    1. Other symbolic dysfunctions  R48.8       2. Cognitive deficits  R41.89       3. Cerebrovascular accident (CVA), unspecified mechanism (HCC)  I63.9            Visit Tracking:  POC expires Unit limit Auth Expiration date PT/OT + Visit Limit?   24 No cog tx codes 3/2/2025 52 PT, OT, ST combined                           Visit/Unit Tracking  AUTH Status:  Date   IE 9/10 9/12 9/17 9/19 10/1 10/3  RE 10/7 10/10 10/17 10/22 10/29 11/4 11/7 11/11   yes Used 1 2 3 4 5 6 7 8 1 2 3 4 5 6 7    Remaining  7 6 5 4 3 2 1 0 9 8 7 6 5 4 3     PT: 5  Speech: 15  OT: 5       Subjective/Behavioral:  -Patient reported no new concerns for today's session.     Objective/Assessment:    Short-term goals: (to be achieved in 6-8 weeks)    Patient will facilitate planning by completing thought organization tasks (e.g., sequencing, deduction puzzles, etc.) with 80% accuracy to facilitate increased executive functioning, working memory, problem solving, and processing skills ONGOING/PARTIALLY MET      IM session #: 3    ACTIVITY SPECIFICS & ADAPTATIONS: headphones, seated, both hands    TEMPO (SPEED): 68  lower = slower processing & movement  higher = faster processing & movement    DIFFICULTY: 150  auto = advanced  100ms = moderate complexity  200-300ms = easiest    INTENSITY SETTIN  burst setting = 2 (easiest) to 15 (hardest)    TASK DURATION: 2/mins  sustained concentration & stamina     MS AVERAGE (TASK SCORE) % SRO (TARGET RESPONSES): SRO HITS/ HIGHEST IN-A-ROW: BURST SCORE:   Task 1  auditory mode only without guide sounds 28 54 17 6   Task 2  visual mode: score without center flash with itnteractive game 264 0 0 0   Task 3 visual mode: score without center flash Cog task 53 37 6 3   Task 4: visual mode:  score without center Cog task 74 24 6 1   Task 5: visual mode: score without center Conversation 70 24 3 0     Key:  MS AVERAGE (TASK SCORE): lower score = better performance    % SRO (TARGET RESPONSES): higher % = better performance    SRO HITS/HIGHEST IN-A-ROW: higher score = better performance    BURST SCORE: higher score = better performance    Task 3/4: patient was given money based word problems (does 5 quarters equal $1.25?). Task completed in 20/25 opp (80% acc) independently, increasing to 25/25 opp (100% acc) from min cueing. Patient benefited from verbal repetition of questions.      Patient will complete auditory immediate and short term memory tasks to 80% accuracy to facilitate increased ability to retell narratives and recall information within functional living environmentONGOING/PARTIALLY MET    To target mental manipulation and working memory, patient will participate in word finding activity (i.e., anagrams) with 80% accuracyONGOING/PARTIALLY MET    Patient will answer questions regarding story read aloud with 80% accuracy to facilitate improved auditory comprehension and recallONGOING/PARTIALLY MET    Patient will demonstrate divided/sustained/alternating/auditory attention tasks by responding to multiple tasks or details within tasks at the same time with min cues in a distracting environment with 80% accuracyONGOING/PARTIALLY MET    Plan:  -Patient was provided with home exercises/activities to target goals in plan of care at the end of today's session.  -Continue with current plan of care.

## 2024-11-14 ENCOUNTER — OFFICE VISIT (OUTPATIENT)
Age: 49
End: 2024-11-14
Payer: COMMERCIAL

## 2024-11-14 DIAGNOSIS — R41.89 COGNITIVE DEFICITS: ICD-10-CM

## 2024-11-14 DIAGNOSIS — R48.8 OTHER SYMBOLIC DYSFUNCTIONS: Primary | ICD-10-CM

## 2024-11-14 DIAGNOSIS — I63.9 CEREBROVASCULAR ACCIDENT (CVA), UNSPECIFIED MECHANISM (HCC): ICD-10-CM

## 2024-11-14 PROCEDURE — 92507 TX SP LANG VOICE COMM INDIV: CPT

## 2024-11-14 NOTE — PROGRESS NOTES
Daily Speech Treatment Note    Today's date: 2024   Patient’s name: Carolina Allison  : 1975  MRN: [id number]  Safety measures:   Referring provider: Yovani Rudolph MD    Encounter Diagnosis     ICD-10-CM    1. Other symbolic dysfunctions  R48.8       2. Cognitive deficits  R41.89       3. Cerebrovascular accident (CVA), unspecified mechanism (HCC)  I63.9              Visit Tracking:  POC expires Unit limit Auth Expiration date PT/OT + Visit Limit?   24 No cog tx codes 3/2/2025 52 PT, OT, ST combined                           Visit/Unit Tracking  AUTH Status:  Date   IE 9/10 9/12 9/17 9/19 10/1 10/3  RE 10/7 10/10 10/17 10/22 10/29 11/4 11/7 11/11 11/14   yes Used 1 2 3 4 5 6 7 8 1 2 3 4 5 6 7 8    Remaining  7 6 5 4 3 2 1 0 9 8 7 6 5 4 3 2     PT: 5  Speech: 15  OT: 5       Subjective/Behavioral:  -Patient reported no new concerns for today's session.     Objective/Assessment:    Short-term goals: (to be achieved in 6-8 weeks)    Patient will facilitate planning by completing thought organization tasks (e.g., sequencing, deduction puzzles, etc.) with 80% accuracy to facilitate increased executive functioning, working memory, problem solving, and processing skills ONGOING/PARTIALLY MET      IM session #: 4    ACTIVITY SPECIFICS & ADAPTATIONS: headphones, seated, both hands    TEMPO (SPEED): 72   lower = slower processing & movement  higher = faster processing & movement    DIFFICULTY: 125 t  auto = advanced  100ms = moderate complexity  200-300ms = easiest    INTENSITY SETTIN   burst setting = 2 (easiest) to 15 (hardest)    TASK DURATION: 2.2/mins task 1/2 task3-8: .8 mins  sustained concentration & stamina     MS AVERAGE (TASK SCORE) % SRO (TARGET RESPONSES): SRO HITS/ HIGHEST IN-A-ROW: BURST SCORE:   Task 1  auditory mode only without guide sounds 27 57 8 7   Task 2  visual mode: score without center flash with itnteractive game 27 57 8 9   Task 3 vauditory mode  Cog task 179 9 1 0    Task 4: auditory mode  Cog task 114 23 3 0   Task 5: auditory mode  Cog task 108 19 2 0   Task 6: auditory mode  Cog task 46 43 4 1   Task 7:auditory mode  Cog task 150 15 1 0   Task 8: auditory mode  Cog task 160 11 1 0   Task 9 Auditory mode with Conversation task 36 51 8 7     Key:  MS AVERAGE (TASK SCORE): lower score = better performance    % SRO (TARGET RESPONSES): higher % = better performance    SRO HITS/HIGHEST IN-A-ROW: higher score = better performance    BURST SCORE: higher score = better performance    Cog Task: patient was given a randomized array of a deck of cards on the table. Patient was given a rule and had to hit each card to tempo of IM that fit under predetermined rule.    Task 3: Rules: [place]/hearts cards: 88% accuracy  Task 4: club/diamonds cards: 57% accuracy  Task 5: even/face cards: 100% accuracy  Task 6: even red odd black cards: 76% accuracy  Task 7:even red odd black cards: 80% accuracy  Task 8:even red odd black cards: 84% accuracy        Patient will complete auditory immediate and short term memory tasks to 80% accuracy to facilitate increased ability to retell narratives and recall information within functional living environmentONGOING/PARTIALLY MET    To target mental manipulation and working memory, patient will participate in word finding activity (i.e., anagrams) with 80% accuracyONGOING/PARTIALLY MET    Patient will answer questions regarding story read aloud with 80% accuracy to facilitate improved auditory comprehension and recallONGOING/PARTIALLY MET    Patient will demonstrate divided/sustained/alternating/auditory attention tasks by responding to multiple tasks or details within tasks at the same time with min cues in a distracting environment with 80% accuracyONGOING/PARTIALLY MET    Plan:  -Patient was provided with home exercises/activities to target goals in plan of care at the end of today's session.  -Continue with current plan of care.

## 2024-11-15 ENCOUNTER — OFFICE VISIT (OUTPATIENT)
Dept: INTERNAL MEDICINE CLINIC | Facility: CLINIC | Age: 49
End: 2024-11-15
Payer: COMMERCIAL

## 2024-11-15 VITALS
HEIGHT: 65 IN | WEIGHT: 265 LBS | OXYGEN SATURATION: 92 % | DIASTOLIC BLOOD PRESSURE: 82 MMHG | BODY MASS INDEX: 44.15 KG/M2 | SYSTOLIC BLOOD PRESSURE: 124 MMHG | HEART RATE: 117 BPM

## 2024-11-15 DIAGNOSIS — J45.909 ASTHMA, UNSPECIFIED ASTHMA SEVERITY, UNSPECIFIED WHETHER COMPLICATED, UNSPECIFIED WHETHER PERSISTENT: ICD-10-CM

## 2024-11-15 DIAGNOSIS — F41.9 ANXIETY: Primary | ICD-10-CM

## 2024-11-15 PROBLEM — E78.5 HYPERLIPIDEMIA: Status: ACTIVE | Noted: 2018-08-31

## 2024-11-15 PROBLEM — R53.1 RIGHT SIDED WEAKNESS: Status: RESOLVED | Noted: 2024-08-03 | Resolved: 2024-11-15

## 2024-11-15 PROBLEM — R29.810 FACIAL DROOP: Status: RESOLVED | Noted: 2024-08-03 | Resolved: 2024-11-15

## 2024-11-15 PROCEDURE — 99214 OFFICE O/P EST MOD 30 MIN: CPT | Performed by: INTERNAL MEDICINE

## 2024-11-15 NOTE — PROGRESS NOTES
Name: Carolina Allison      : 1975      MRN: [id number]  Encounter Provider: Yovani Rudolph MD  Encounter Date: 11/15/2024   Encounter department: [facility] INTERNAL MEDICINE [place]  :  Assessment & Plan  Anxiety  We started Lexapro about a month ago.  She is here for follow-up. Sh feels well on lexapro 5 mg.       Asthma, unspecified asthma severity, unspecified whether complicated, unspecified whether persistent  Stable. Denies issues.             Depression Screening and Follow-up Plan: Patient was screened for depression during today's encounter. They screened negative with a PHQ-2 score of 0.Patient with underlying depression and was advised to continue current medications as prescribed.       History of Present Illness     Here for anxiety follow-up.      Review of Systems   Constitutional:  Negative for chills and fever.   HENT:  Negative for ear pain and sore throat.    Eyes:  Negative for pain and visual disturbance.   Respiratory:  Negative for cough and shortness of breath.    Cardiovascular:  Negative for chest pain and palpitations.   Gastrointestinal:  Negative for abdominal pain and vomiting.   Genitourinary:  Negative for dysuria and hematuria.   Musculoskeletal:  Negative for arthralgias and back pain.   Skin:  Negative for color change and rash.   Neurological:  Negative for seizures and syncope.   All other systems reviewed and are negative.    Past Medical History   Past Medical History:   Diagnosis Date    Anemia     Anesthesia     Cardiac event    Asthma     Bell's palsy     COVID-19 10/20/2020    Diabetes mellitus (HCC)     borderline-diet controlled    Diabetes mellitus during pregnancy     Disease of thyroid gland     Hashimoto's disease     Migraine      Past Surgical History:   Procedure Laterality Date     SECTION      HYSTEROSCOPY      with resection of intrauterine septum    NM HYSTEROSCOPY BX ENDOMETRIUM&/POLYPC W/WO D&C N/A 2023    Procedure: DILATATION  AND CURETTAGE (D&C) WITH HYSTEROSCOPY;  Surgeon: Dunia Sarmiento MD;  Location: [facility];  Service: Gynecology    WV LAPS TOTAL HYSTERECT 250 GM/< W/RMVL TUBE/OVARY Bilateral 9/22/2023    Procedure: HYSTERECTOMY LAPAROSCOPIC TOTAL WITH BILATERAL SALPINGO-OOPHERECTOMY;  Surgeon: Kailey Aguirre MD;  Location: [facility];  Service: Gynecology    US GUIDED THYROID BIOPSY  6/13/2019    US GUIDED THYROID BIOPSY  9/19/2019     Family History   Problem Relation Age of Onset    Migraines Mother     Heart disease Father         cardiac disorder    Hyperlipidemia Father     Hypertension Father     Breast cancer Maternal Grandmother     Cervical cancer Maternal Grandmother     Ovarian cancer Paternal Grandmother     Cancer Paternal Grandmother     Cervical cancer Maternal Aunt     Endometrial cancer Maternal Aunt 20    Breast cancer Paternal Aunt     Endometrial cancer Paternal Aunt 30    Uterine cancer Paternal Aunt     Ovarian cancer Paternal Aunt     Colon cancer Neg Hx       reports that she has never smoked. She has never used smokeless tobacco. She reports current alcohol use. She reports that she does not use drugs.  Current Outpatient Medications on File Prior to Visit   Medication Sig Dispense Refill    acetaminophen (TYLENOL) 325 mg tablet Take 2 tablets (650 mg total) by mouth every 6 (six) hours as needed for mild pain  0    aspirin (ECOTRIN LOW STRENGTH) 81 mg EC tablet Take 81 mg by mouth daily      Blood Glucose Monitoring Suppl ([employer] Blood Glucose Meter) w/Device KIT Test blood sugar three times daily. 1 kit 2    escitalopram (LEXAPRO) 5 mg tablet TAKE 1 TABLET (5 MG TOTAL) BY MOUTH DAILY. 90 tablet 1    glucose blood (OneTouch Ultra) test strip DX: E11.9. Test blood sugar three times daily. 200 each 3    glucose blood test strip Test blood sugar three times daily. 200 strip 5    ibuprofen (MOTRIN) 600 mg tablet Take 1 tablet (600 mg total) by mouth every 6 (six) hours as needed for moderate  "pain 30 tablet 0    insulin glargine (Lantus) 100 units/mL subcutaneous injection Inject 10 Units under the skin daily at bedtime 10 mL 0    Lancets (MetaCure Unistik 2) MISC Test blood sugar three times daily. 200 each 5    levothyroxine 100 mcg tablet Take one tab po every other day (alt with 112 mcg) 90 tablet 0    levothyroxine 112 mcg tablet Take one tab po every other day (alt with 110 mcg) 90 tablet 0    lisinopril (ZESTRIL) 5 mg tablet Take 1 tablet (5 mg total) by mouth daily 90 tablet 3    metFORMIN (GLUCOPHAGE) 1000 MG tablet Take 1 tablet (1,000 mg total) by mouth 2 (two) times a day with meals 180 tablet 1    tirzepatide 2.5 MG/0.5ML Inject 0.5 mL (2.5 mg total) under the skin every 7 days       No current facility-administered medications on file prior to visit.     Allergies   Allergen Reactions    Iodinated Contrast Media Anaphylaxis     SOB, chest tightness, required epinephrine         Objective   /82 (BP Location: Left arm, Patient Position: Sitting, Cuff Size: Large)   Pulse (!) 117   Ht 5' 5\" (1.651 m)   Wt 120 kg (265 lb)   LMP 09/01/2023   SpO2 92%   BMI 44.10 kg/m²      Physical Exam  Vitals and nursing note reviewed.   Constitutional:       General: She is not in acute distress.     Appearance: She is well-developed.   HENT:      Head: Normocephalic and atraumatic.   Eyes:      Conjunctiva/sclera: Conjunctivae normal.   Cardiovascular:      Rate and Rhythm: Normal rate.      Heart sounds: No murmur heard.  Pulmonary:      Effort: Pulmonary effort is normal. No respiratory distress.   Abdominal:      Tenderness: There is no abdominal tenderness.   Musculoskeletal:         General: No swelling.   Skin:     General: Skin is warm and dry.      Capillary Refill: Capillary refill takes less than 2 seconds.   Neurological:      Mental Status: She is alert.   Psychiatric:         Mood and Affect: Mood normal.         I have spent a total time of 15 minutes in caring for this patient on " the day of the visit/encounter including Impressions, Counseling / Coordination of care, and Reviewing / ordering tests, medicine, procedures  .

## 2024-11-15 NOTE — ASSESSMENT & PLAN NOTE
We started Lexapro about a month ago.  She is here for follow-up. Sh feels well on lexapro 5 mg.

## 2024-11-18 ENCOUNTER — EVALUATION (OUTPATIENT)
Age: 49
End: 2024-11-18
Payer: COMMERCIAL

## 2024-11-18 ENCOUNTER — OFFICE VISIT (OUTPATIENT)
Age: 49
End: 2024-11-18
Payer: COMMERCIAL

## 2024-11-18 DIAGNOSIS — R48.8 OTHER SYMBOLIC DYSFUNCTIONS: Primary | ICD-10-CM

## 2024-11-18 DIAGNOSIS — R53.1 RIGHT SIDED WEAKNESS: Primary | ICD-10-CM

## 2024-11-18 DIAGNOSIS — R41.89 COGNITIVE DEFICITS: ICD-10-CM

## 2024-11-18 DIAGNOSIS — I63.9 CEREBROVASCULAR ACCIDENT (CVA), UNSPECIFIED MECHANISM (HCC): ICD-10-CM

## 2024-11-18 PROCEDURE — 92507 TX SP LANG VOICE COMM INDIV: CPT

## 2024-11-18 PROCEDURE — 97112 NEUROMUSCULAR REEDUCATION: CPT

## 2024-11-18 NOTE — PROGRESS NOTES
POC expires Auth Status Total   Visits  Start date  Expiration date PT/OT + Visit Limit? Co-Insurance   24 BOMN  10/10/24  52 per year for all therapies                                             Visit/Unit Tracking  AUTH Status: BOMN Date 10/10 10/14 10/28 11/4 11/11 11/18         Visits  Authed:  Used 1 (IE) 1 1 1  1          Remaining                  PLAN OF CARE START: 10/10/24  PLAN OF CARE END: 2024  PROGRESS NOTE DUE: follow up post scheduling (plan to DC end , schedule full re-eval)   FREQUENCY: 1-2x/week for 6-8 weeks  PRECAUTIONS: none  DIAGNOSIS: CVA (2024), R side weakness   VISITS: 6 of 17   Daily Note     Today's date: 2024  Patient name: Carolina Allison  : 1975  MRN: [id number]  Referring provider: Yovani Rudolph MD  Dx:   Encounter Diagnosis     ICD-10-CM    1. Right sided weakness  R53.1                          Subjective: Pt reports no changes since last visit.       Objective: See treatment below.    NMRE:   *Impairment Observations:Assessed the following this session to assess progress in therapy:  strength, pinch strength, functional dexterity test. All other observations/assessments carried forward from previous evaluation. See below for goal updates.       R UE status on 24 R UE Status on IE:10/10/24 L UE Status on IE: 10/10/24 Comments           UPPER EXTREMITY FUNCTION    Impaired Intact Dominant Hand: R UE     /PINCH STRENGTH              Dynamometer     - Gross Grasp  abnormal - R UE norm: 62lbs 45lbs 34 lbs 55 lbs    Pinch Meter      - Pincer  abnormal - R UE norm: 13lbs 8lbs 8 lbs 9 lbs     - Tripod  abnormal - R UE norm: 17lbs 10lbs 6 lbs 11 lbs     - Lateral  abnormal - R UE norm: 17lbs 11lbs 11 lbs  12 lbs      COORDINATION       9 Hole Peg Test-  assesses dexterity/fine motor coordination     abnormal - R UE norm: 18 seconds    24 seconds 24 seconds        Fxnl Dexterity Test-assesses patient's ability to use the  hand for daily tasks requiring a 3-jaw jose r prehension between the fingers and the thumb    abnormal - R UE norm: 22 seconds  25 seconds  29 seconds 32 seconds          SHORT TERM GOALS (4-6 weeks)    GOAL  STATUS ON IE  GOAL STATUS    Pt will increase R UE  strength by 2 lbs to complete work roles and IADLs 34lbs ACHIEVED         Pt will increase  R UE 2 point pinch strength by 2lbs to complete functional ADLs and IADLs with increased independence   8lbs PROGRESSING, CONTINUE GOAL        Pt will increase  R UE 3 point pinch strength by 2lbs to complete functional ADLs and IADLs with increased independence   6lbs ACHIEVED      Pt will increase  R UE lateral  pinch strength by 2lbs to complete functional ADLs and IADLs with increased independence   11lbs PROGRESSING, CONTINUE GOAL        Pt will demonstrate improved in hand manipulation skills as evidenced by performing functional dexterity test with R UE in 27 seconds or less in order to increase independence with work related roles 29 seconds ACHIEVED        LONG TERM GOALS( 6-8 weeks)    GOAL  STATUS ON IE  GOAL STATUS    Pt will increase R UE  strength by 4 lbs to complete work roles and IADLs 24lbs ACHIEVED        Pt will increase  R UE 3 point pinch strength by 4lbs to complete functional ADLs and IADLs with increased independence   6lbs ACHIEVED        Pt will increase  R UE lateral  pinch strength by 3lbs to complete functional ADLs and IADLs with increased independence   11lbs PROGRESSING, CONTINUE GOAL        Pt will demonstrate improved FMC as evidenced by performing 9 hole peg test with R UE in 21 seconds or less in order to increase independence with work related roles 24lbs PROGRESSING, CONTINUE GOAL        Pt will demonstrate improved in hand manipulation skills as evidenced by performing functional dexterity test with R UE in 25 seconds or less in order to increase independence with work related roles 29 seconds ACHIEVED        Pt will demo  with G carryover of Home Exercise Program to improve functional progression towards goals in plan of care and for improved functional use of UE  ACHIEVED          *reach,grasp,release of small metal pegs towel>vertical board using R UE 2 point pinch. Performed rotation of peg 3x prior to release as well as finger<>palmar translation prior to release. Removed using black resistive clothespin. ~10% dropping noted.     *reach,grasp,release of small plastic beads towel>shoe lace grasping/release 1 at a time, transporting 5 at a time via finger<>palmar translation with stabilization.      Assessment: Tolerated treatment well. Pt demonstrated good improvement on testing including  strength, in hand manipulation, and 3 point pinch. 3# weight added to R UE for increased challenge to promote muscle recruitment and to provide proprioceptive input. Focus of session on R UE pinch strength, fine motor coordination, and in hand manipulation skills needed for daily tasks. Pt continues to demo G task endurance throughout sessions. Pt would benefit from continued OT services to address R UE coordination, in hand manipulation,  and pinch strength.     Plan: Continued skilled OT per POC.

## 2024-11-18 NOTE — PROGRESS NOTES
Daily Speech Treatment Note    Today's date: 2024   Patient’s name: Carolina Allison  : 1975  MRN: [id number]  Safety measures:   Referring provider: Yovani Rudolph MD    Encounter Diagnosis     ICD-10-CM    1. Other symbolic dysfunctions  R48.8       2. Cognitive deficits  R41.89       3. Cerebrovascular accident (CVA), unspecified mechanism (HCC)  I63.9                Visit Tracking:  POC expires Unit limit Auth Expiration date PT/OT + Visit Limit?   24 No cog tx codes 3/2/2025 52 PT, OT, ST combined                           Visit/Unit Tracking  AUTH Status:  Date   IE 9/10 9/12 9/17 9/19 10/1 10/3  RE 10/7 10/10 10/17 10/22 10/29 11/4 11/7 11/11 11/14 11/18   yes Used 1 2 3 4 5 6 7 8 1 2 3 4 5 6 7 8 9    Remaining  7 6 5 4 3 2 1 0 9 8 7 6 5 4 3 2 1     PT: 5  Speech: 17  OT: 5       Subjective/Behavioral:  -Patient reported no new concerns for today's session.     Objective/Assessment:    Short-term goals: (to be achieved in 6-8 weeks)    Patient will facilitate planning by completing thought organization tasks (e.g., sequencing, deduction puzzles, etc.) with 80% accuracy to facilitate increased executive functioning, working memory, problem solving, and processing skills ONGOING/PARTIALLY MET      IM session #: 5    ACTIVITY SPECIFICS & ADAPTATIONS: headphones, seated, both hands    TEMPO (SPEED): 75   lower = slower processing & movement  higher = faster processing & movement    DIFFICULTY: 115   auto = advanced  100ms = moderate complexity  200-300ms = easiest    INTENSITY SETTIN   burst setting = 2 (easiest) to 15 (hardest)    TASK DURATION: 3.0/mins   sustained concentration & stamina     MS AVERAGE (TASK SCORE) % SRO (TARGET RESPONSES): SRO HITS/ HIGHEST IN-A-ROW: BURST SCORE:   Task 1  visual mode: score without center flash with itnteractive game 26 64 14 18   Task 2 visual mode: score without center flash with itnteractive game with cog task    47 36 5 5   Task 3 : auditory  mode with cog task  36 51 9 13   Task 4: auditory mode  Cog task 53 38 6 6   Task 5: auditory mode  with conversation 53 24 4 3     Key:  MS AVERAGE (TASK SCORE): lower score = better performance    % SRO (TARGET RESPONSES): higher % = better performance    SRO HITS/HIGHEST IN-A-ROW: higher score = better performance    BURST SCORE: higher score = better performance    Cog Task 2/3: Anagrams: Task completed in 36/37 opp (97% acc) independently, increasing to 37/37 opp (100% acc) from min cueing. Patient benefited from phonemic cues.    Cog task 4: patient was given 4 words and a f/u questions regarding category inclusion (which words were animals?). Task completed in 9/10 opp (90% acc) independently, increasing to 10/10 opp (100% acc) from min cueing. Patient benefited from repetition of words.        Patient will complete auditory immediate and short term memory tasks to 80% accuracy to facilitate increased ability to retell narratives and recall information within functional living environmentONGOING/PARTIALLY MET    To target mental manipulation and working memory, patient will participate in word finding activity (i.e., anagrams) with 80% accuracyONGOING/PARTIALLY MET    Patient will answer questions regarding story read aloud with 80% accuracy to facilitate improved auditory comprehension and recallONGOING/PARTIALLY MET    Patient will demonstrate divided/sustained/alternating/auditory attention tasks by responding to multiple tasks or details within tasks at the same time with min cues in a distracting environment with 80% accuracyONGOING/PARTIALLY MET    Plan:  -Patient was provided with home exercises/activities to target goals in plan of care at the end of today's session.  -Continue with current plan of care.

## 2024-11-21 ENCOUNTER — EVALUATION (OUTPATIENT)
Age: 49
End: 2024-11-21
Payer: COMMERCIAL

## 2024-11-21 DIAGNOSIS — I63.9 CEREBROVASCULAR ACCIDENT (CVA), UNSPECIFIED MECHANISM (HCC): ICD-10-CM

## 2024-11-21 DIAGNOSIS — R41.89 COGNITIVE DEFICITS: ICD-10-CM

## 2024-11-21 DIAGNOSIS — R48.8 OTHER SYMBOLIC DYSFUNCTIONS: Primary | ICD-10-CM

## 2024-11-21 PROCEDURE — 96125 COGNITIVE TEST BY HC PRO: CPT

## 2024-11-25 ENCOUNTER — OFFICE VISIT (OUTPATIENT)
Age: 49
End: 2024-11-25
Payer: COMMERCIAL

## 2024-11-25 ENCOUNTER — APPOINTMENT (OUTPATIENT)
Age: 49
End: 2024-11-25
Payer: COMMERCIAL

## 2024-11-25 DIAGNOSIS — R48.8 OTHER SYMBOLIC DYSFUNCTIONS: Primary | ICD-10-CM

## 2024-11-25 DIAGNOSIS — I63.9 CEREBROVASCULAR ACCIDENT (CVA), UNSPECIFIED MECHANISM (HCC): ICD-10-CM

## 2024-11-25 DIAGNOSIS — R53.1 RIGHT SIDED WEAKNESS: Primary | ICD-10-CM

## 2024-11-25 DIAGNOSIS — R41.89 COGNITIVE DEFICITS: ICD-10-CM

## 2024-11-25 PROCEDURE — 97112 NEUROMUSCULAR REEDUCATION: CPT

## 2024-11-25 PROCEDURE — 92507 TX SP LANG VOICE COMM INDIV: CPT

## 2024-11-25 NOTE — PROGRESS NOTES
POC expires Auth Status Total   Visits  Start date  Expiration date PT/OT + Visit Limit? Co-Insurance   24 BOMN  10/10/24  52 per year for all therapies                                             Visit/Unit Tracking  AUTH Status: BOMN Date 10/10 10/14 10/28 11/4 11/11 11/18 11/25        Visits  Authed:  Used 1 (IE) 1 1 1  1 1         Remaining                  PLAN OF CARE START: 10/10/24  PLAN OF CARE END: 2024  PROGRESS NOTE DUE: 24 (full re-eval for d/c)   FREQUENCY: 1-2x/week for 6-8 weeks  PRECAUTIONS: none  DIAGNOSIS: CVA (2024), R side weakness   VISITS: 7 of 17   Daily Note     Today's date: 2024  Patient name: Carolina Allison  : 1975  MRN: [id number]  Referring provider: Yovani Rudolph MD  Dx:   Encounter Diagnosis     ICD-10-CM    1. Right sided weakness  R53.1                            Subjective: Pt reports no changes since last visit.       Objective: See treatment below.      NMRE:   *Reach, grasp, release of [place] manipulation pegs with blue resistive clips. 2 lb wrist weight added to R UE to promote muscle recruitment and for increased proprioceptive input to R side. Emphasis on sustained pinch and coordination skills. Pt tolerating well and completing in G amount of time.     *Stacking small wooden blocks in stacks of x4 with hand exerciser and R UE. 2 lb wrist weight added to R UE to promote muscle recruitment and for increased proprioceptive input to R sides. Overall G target accuracy and coordination skills noted.    *Modified previous activity by having string wooden blocks onto shoe string for bimanual coordination skills. Tolerated well with no drops and completing in G amount of time.        Assessment: Tolerated treatment well. Focus of session on R UE coordination, in hand manipulation, target accuracy, and proprioception. Pt would benefit from continued OT services to address R UE coordination, in hand manipulation,  and pinch  strength.     Plan: Continued skilled OT per POC.

## 2024-11-25 NOTE — PROGRESS NOTES
Daily Speech Treatment Note    Today's date: 2024   Patient’s name: Carolina Allison  : 1975  MRN: [id number]  Safety measures:   Referring provider: Yovani Rudolph MD    Encounter Diagnosis     ICD-10-CM    1. Other symbolic dysfunctions  R48.8       2. Cognitive deficits  R41.89       3. Cerebrovascular accident (CVA), unspecified mechanism (HCC)  I63.9         Visit Tracking:  POC expires Unit limit Auth Expiration date PT/OT + Visit Limit?   24 No cog tx codes 3/2/2025 52 PT, OT, ST combined                           Visit/Unit Tracking  AUTH Status:  Date   IE 9/10 9/12 9/17 9/19 10/1 10/3  RE 10/7 10/10 10/17 10/22 10/29 11/4 11/7 11/11 11/14 11/18 11/21  Re   yes Used 1 2 3 4 5 6 7 8 1 2 3 4 5 6 7 8 9 10    Remaining  7 6 5 4 3 2 1 0 9 8 7 6 5 4 3 2 1 0                                                   Subjective/Behavioral:  -Patient reported no new concerns for today's session.    Objective/Assessment:  -Reviewed testing results and goals in plan care with patient. Patient is in agreement at this time.    Short-term goals: (to be achieved in 6-8 weeks)    Patient will facilitate planning by completing thought organization tasks (e.g., sequencing, deduction puzzles, etc.) with 80% accuracy to facilitate increased executive functioning, working memory, problem solving, and processing skills PARTIALLY MET      Patient will demonstrate divided/sustained/alternating/auditory attention tasks by responding to multiple tasks or details within tasks at the same time with min cues in a distracting environment with 80% accuracy PARTIALLY MET    patient utilized Blink cards where he had to separate the cards into different categories  1 ELEMENT: Shape: 51 sec with 100% accuracy independently  2 elements: sort Colors & verbalize shapes : 1:42 mins/secs with 100% accuracy from min visual   2 elements: sort number of shapes & verbalize colors : 1:24 mins/secs with 100% accuracy from min  visual   2 elements: sort shapes & verbalize numbers : 1:24 mins/secs with 100% accuracy from min visual   2 elements: sort numbers & verbalize shapes : 1:32 mins/secs with 80% accuracy independently, increasing to 100% accuracy from min visual on 2nd attempt    Plan:  -Patient was provided with home exercises/activities to target goals in plan of care at the end of today's session.  -Continue with current plan of care.

## 2024-12-03 ENCOUNTER — OFFICE VISIT (OUTPATIENT)
Age: 49
End: 2024-12-03
Payer: COMMERCIAL

## 2024-12-03 DIAGNOSIS — R41.89 COGNITIVE DEFICITS: ICD-10-CM

## 2024-12-03 DIAGNOSIS — I63.9 CEREBROVASCULAR ACCIDENT (CVA), UNSPECIFIED MECHANISM (HCC): ICD-10-CM

## 2024-12-03 DIAGNOSIS — R48.8 OTHER SYMBOLIC DYSFUNCTIONS: Primary | ICD-10-CM

## 2024-12-03 PROCEDURE — 92507 TX SP LANG VOICE COMM INDIV: CPT

## 2024-12-03 NOTE — PROGRESS NOTES
Daily Speech Treatment Note    Today's date: 12/3/2024   Patient’s name: Carolina Allison  : 1975  MRN: [id number]  Safety measures:   Referring provider: Yovani Rudolph MD    Encounter Diagnosis     ICD-10-CM    1. Other symbolic dysfunctions  R48.8       2. Cognitive deficits  R41.89       3. Cerebrovascular accident (CVA), unspecified mechanism (HCC)  I63.9           Visit Tracking:  POC expires Unit limit Auth Expiration date PT/OT + Visit Limit?   24 No cog tx codes 3/2/2025 52 PT, OT, ST combined   2025 12                     Visit/Unit Tracking  AUTH Status:  Date   IE 9/10 9/12 9/17 9/19 10/1 10/3  RE 10/7 10/10 10/17 10/22 10/29 11/4 11/7 11/11 11/14 11/18 11/21  Re   yes Used 1 2 3 4 5 6 7 8 1 2 3 4 5 6 7 8 9 10    Remaining  7 6 5 4 3 2 1 0 9 8 7 6 5 4 3 2 1 0     11/25 12/3                     1 2                     7 6                       Subjective/Behavioral:  -Patient reported no new concerns for today's session.    Objective/Assessment:    Short-term goals: (to be achieved in 6-8 weeks)    Patient will facilitate planning by completing thought organization tasks (e.g., sequencing, deduction puzzles, etc.) with 80% accuracy to facilitate increased executive functioning, working memory, problem solving, and processing skills        IM session #: 6    ACTIVITY SPECIFICS & ADAPTATIONS: headphones, seated, both hands    TEMPO (SPEED): 75   lower = slower processing & movement  higher = faster processing & movement    DIFFICULTY: 115   auto = advanced  100ms = moderate complexity  200-300ms = easiest    INTENSITY SETTIN   burst setting = 2 (easiest) to 15 (hardest)    TASK DURATION: 3.0/mins   sustained concentration & stamina     MS AVERAGE (TASK SCORE) % SRO (TARGET RESPONSES): SRO HITS/ HIGHEST IN-A-ROW: BURST SCORE:   Task 1  Auditory mode  35 43 11 11   Task 2 auditory mode with cog task   64 29 6 3   Task 3 : auditory mode with cog task  81 26 6 4   Task 4:  auditory mode  Cog task 45 39 12 11   Task 5: auditory mode  with conversation 50 38 8 8     Key:  MS AVERAGE (TASK SCORE): lower score = better performance    % SRO (TARGET RESPONSES): higher % = better performance    SRO HITS/HIGHEST IN-A-ROW: higher score = better performance    BURST SCORE: higher score = better performance    Cog Task 2/3: Patient was given a picture stimuli and had to identify the category of picture stimuli. Task completed in 21/23 opp (91% acc) independently, increasing to 23/23 opp (100% acc) from min cueing. Patient benefited from semantic cues. Then patient had to spell the word forwards and backwards. Task completed in 21/23 opp (91% acc) independently, increasing to 23/23 opp (100% acc) from min cueing. Patient benefited from semantic cues.    Cog task 4: Patient was given a cards with different colors and the letters were different colors. Patient to verbalize the colors of letters. Task completed with 100% acc independently.           Patient will demonstrate divided/sustained/alternating/auditory attention tasks by responding to multiple tasks or details within tasks at the same time with min cues in a distracting environment with 80% accuracy     Plan:  -Patient was provided with home exercises/activities to target goals in plan of care at the end of today's session.  -Continue with current plan of care.

## 2024-12-04 ENCOUNTER — OFFICE VISIT (OUTPATIENT)
Age: 49
End: 2024-12-04
Payer: COMMERCIAL

## 2024-12-04 DIAGNOSIS — R53.1 RIGHT SIDED WEAKNESS: Primary | ICD-10-CM

## 2024-12-04 PROCEDURE — 97112 NEUROMUSCULAR REEDUCATION: CPT

## 2024-12-04 NOTE — PROGRESS NOTES
POC expires Auth Status Total   Visits  Start date  Expiration date PT/OT + Visit Limit? Co-Insurance   24 BOMN  10/10/24  52 per year for all therapies                                             Visit/Unit Tracking  AUTH Status: BOMN Date 10/10 10/14 10/28 11/4 11/11 11/18 11/25 12/4       Visits  Authed:  Used 1 (IE) 1 1 1  1 1 1        Remaining                  PLAN OF CARE START: 10/10/24  PLAN OF CARE END: 2024  PROGRESS NOTE DUE: 24 (full re-eval for d/c)   FREQUENCY: 1-2x/week for 6-8 weeks  PRECAUTIONS: none  DIAGNOSIS: CVA (2024), R side weakness   VISITS: 8 of    Daily Note     Today's date: 2024  Patient name: Carolina Allison  : 1975  MRN: [id number]  Referring provider: Yovani Rudolph MD  Dx:   Encounter Diagnosis     ICD-10-CM    1. Right sided weakness  R53.1                              Subjective: Pt reports no changes since last visit.       Objective: See treatment below.      NMRE:   *Reach, grasp, release of thin pegs into pegboard placed on vertical surface. Upgraded to having patient rotate x3 above head before placing. 2 lb wrist weight added to R UE to promote muscle recruitment and for increased proprioceptive input to R side. Patient tolerating well with no difficulties and completing in G amount of time. Had pt remove pegs with tweezers.    *Used [employer] tools b/l focusing on functional hand strength for pinching/grasping and opening containers, in CW/CCW movements.   - large knob turn in standard    - cap turn with full grasp   - peg pull   - L-bar functional pull        Assessment: Tolerated treatment well. Focus of session on R UE coordination/in hand manipulation, proprioception, and UE strength. Pt tolerating all activities well today and completing in G amount of time. Pt would benefit from continued OT services to address R UE coordination, in hand manipulation,  and pinch strength.     Plan: Continued skilled OT per  POC.

## 2024-12-09 ENCOUNTER — APPOINTMENT (OUTPATIENT)
Age: 49
End: 2024-12-09
Payer: COMMERCIAL

## 2024-12-10 ENCOUNTER — OFFICE VISIT (OUTPATIENT)
Age: 49
End: 2024-12-10
Payer: COMMERCIAL

## 2024-12-10 DIAGNOSIS — R41.89 COGNITIVE DEFICITS: ICD-10-CM

## 2024-12-10 DIAGNOSIS — R53.1 RIGHT SIDED WEAKNESS: Primary | ICD-10-CM

## 2024-12-10 DIAGNOSIS — R48.8 OTHER SYMBOLIC DYSFUNCTIONS: Primary | ICD-10-CM

## 2024-12-10 DIAGNOSIS — I63.9 CEREBROVASCULAR ACCIDENT (CVA), UNSPECIFIED MECHANISM (HCC): ICD-10-CM

## 2024-12-10 PROCEDURE — 92507 TX SP LANG VOICE COMM INDIV: CPT

## 2024-12-10 PROCEDURE — 97112 NEUROMUSCULAR REEDUCATION: CPT

## 2024-12-10 NOTE — PROGRESS NOTES
Daily Speech Treatment Note    Today's date: 12/10/2024   Patient’s name: Carolina Allison  : 1975  MRN: [id number]  Safety measures:   Referring provider: Yovani Rudolph MD    Encounter Diagnosis     ICD-10-CM    1. Other symbolic dysfunctions  R48.8       2. Cognitive deficits  R41.89       3. Cerebrovascular accident (CVA), unspecified mechanism (HCC)  I63.9             Visit Tracking:  POC expires Unit limit Auth Expiration date PT/OT + Visit Limit?   24 No cog tx codes 3/2/2025 52 PT, OT, ST combined   2025 12                     Visit/Unit Tracking  AUTH Status:  Date   IE 9/10 9/12 9/17 9/19 10/1 10/3  RE 10/7 10/10 10/17 10/22 10/29 11/4 11/7 11/11 11/14 11/18 11/21  Re   yes Used 1 2 3 4 5 6 7 8 1 2 3 4 5 6 7 8 9 10    Remaining  7 6 5 4 3 2 1 0 9 8 7 6 5 4 3 2 1 0     11/25 12/3 12/10                    1 2 3                    7 6 5                      Subjective/Behavioral:  -Patient reported no new concerns for today's session.    Objective/Assessment:    Short-term goals: (to be achieved in 6-8 weeks)    Patient will facilitate planning by completing thought organization tasks (e.g., sequencing, deduction puzzles, etc.) with 80% accuracy to facilitate increased executive functioning, working memory, problem solving, and processing skills        IM session #: 7    ACTIVITY SPECIFICS & ADAPTATIONS: headphones, seated, both hands    TEMPO (SPEED): 78   lower = slower processing & movement  higher = faster processing & movement    DIFFICULTY: 115   auto = advanced  100ms = moderate complexity  200-300ms = easiest    INTENSITY SETTIN   burst setting = 2 (easiest) to 15 (hardest)    TASK DURATION: 3.2/mins   sustained concentration & stamina     MS AVERAGE (TASK SCORE) % SRO (TARGET RESPONSES): SRO HITS/ HIGHEST IN-A-ROW: BURST SCORE:   Task 1  Auditory mode with interactive game 30 48 7 11   Task 2 Auditory mode with interactive game with cog task   57 28 4 3   Task 3 :  Auditory mode with interactive game with cog task 52 36 8 8   Task 4: Auditory mode with interactive game with cog task 64 24 5 3   Task 5: auditory mode  with conversation 57 34 7 1     Key:  MS AVERAGE (TASK SCORE): lower score = better performance    % SRO (TARGET RESPONSES): higher % = better performance    SRO HITS/HIGHEST IN-A-ROW: higher score = better performance    BURST SCORE: higher score = better performance    Cog Task 2/3: patient was given 4 words and had to list the reverse order of the words. Task completed in 13/20 opp (65% acc) independently, increasing to 20/20 opp (100% acc) from min cueing. Patient benefited from repetition    Cog task: 4: patient was given 4 words and had a f/u question regarding category inclusion. Task completed in 10/10 opp (100% acc) independently!            Patient will demonstrate divided/sustained/alternating/auditory attention tasks by responding to multiple tasks or details within tasks at the same time with min cues in a distracting environment with 80% accuracy     Plan:  -Patient was provided with home exercises/activities to target goals in plan of care at the end of today's session.  -Continue with current plan of care.

## 2024-12-10 NOTE — PROGRESS NOTES
POC expires Auth Status Total   Visits  Start date  Expiration date PT/OT + Visit Limit? Co-Insurance   24 BOMN  10/10/24  52 per year for all therapies                                             Visit/Unit Tracking  AUTH Status: BOMN Date 10/10 10/14 10/28 11/4 11/11 11/18 11/25 12/4 12/10      Visits  Authed:  Used 1 (IE) 1 1 1  1 1 1 1       Remaining                  PLAN OF CARE START: 10/10/24  PLAN OF CARE END: 2024  PROGRESS NOTE DUE: 24 (full re-eval for d/c)   FREQUENCY: 1-2x/week for 6-8 weeks  PRECAUTIONS: none  DIAGNOSIS: CVA (2024), R side weakness   VISITS:    Daily Note     Today's date: 12/10/2024  Patient name: Carolina Allison  : 1975  MRN: [id number]  Referring provider: Yovani Rudolph MD  Dx:   Encounter Diagnosis     ICD-10-CM    1. Right sided weakness  R53.1                                Subjective: Pt reports no changes since last visit.       Objective: See treatment below.      NMRE:   *Bimanual coordination of connecting colored paper clips together while donned with 2.5 lb wrist weight on R UE. Upgraded to having patient hang onto drying rack placed tabletop for increased range of motion of strength. Pt able to complete with no difficulties and G fine motor skills.     *Reach, grasp, release of small 1 inch foam blocks R>L with blue pnkster into colored targets in field of 4. Patrick added as visual cue to facilitate proximal ROM/strength. Pt completed 1 round of activity in G amount of time with minimal dropping.        Assessment: Tolerated treatment well. Focus of session on R UE coordination/in hand manipulation, proprioception, and UE strength. Pt continues to tolerate all activities well and completing in G amount of time. Plan to discharge next session. Pt would benefit from continued OT services to address R UE coordination, in hand manipulation,  and pinch strength.     Plan: Continued skilled OT per POC.

## 2024-12-13 ENCOUNTER — VBI (OUTPATIENT)
Dept: ADMINISTRATIVE | Facility: OTHER | Age: 49
End: 2024-12-13

## 2024-12-13 NOTE — TELEPHONE ENCOUNTER
12/13/24 4:14 PM     Chart reviewed for CRC: Colonoscopy was/were not submitted to the patient's insurance.     Fariha Saab MA   [facility]

## 2024-12-16 ENCOUNTER — APPOINTMENT (OUTPATIENT)
Age: 49
End: 2024-12-16
Payer: COMMERCIAL

## 2024-12-16 NOTE — PROGRESS NOTES
Daily Speech Treatment Note    Today's date: 2024   Patient’s name: Carolina Allison  : 1975  MRN: [id number]  Safety measures:   Referring provider: Yovani Rudolph MD    No diagnosis found.        Visit Tracking:  POC expires Unit limit Auth Expiration date PT/OT + Visit Limit?   24 No cog tx codes 3/2/2025 52 PT, OT, ST combined   2025 12                     Visit/Unit Tracking  AUTH Status:  Date   IE 9/10 9/12 9/17 9/19 10/1 10/3  RE 10/7 10/10 10/17 10/22 10/29 11/4 11/7 11/11 11/14 11/18 11/21  Re   yes Used 1 2 3 4 5 6 7 8 1 2 3 4 5 6 7 8 9 10    Remaining  7 6 5 4 3 2 1 0 9 8 7 6 5 4 3 2 1 0     11/25 12/3 12/10                    1 2 3                    7 6 5                      Subjective/Behavioral:  -Patient reported no new concerns for today's session.    Objective/Assessment:    Short-term goals: (to be achieved in 6-8 weeks)    Patient will facilitate planning by completing thought organization tasks (e.g., sequencing, deduction puzzles, etc.) with 80% accuracy to facilitate increased executive functioning, working memory, problem solving, and processing skills        IM session #: 7    ACTIVITY SPECIFICS & ADAPTATIONS: headphones, seated, both hands    TEMPO (SPEED): 78   lower = slower processing & movement  higher = faster processing & movement    DIFFICULTY: 115   auto = advanced  100ms = moderate complexity  200-300ms = easiest    INTENSITY SETTIN   burst setting = 2 (easiest) to 15 (hardest)    TASK DURATION: 3.2/mins   sustained concentration & stamina     MS AVERAGE (TASK SCORE) % SRO (TARGET RESPONSES): SRO HITS/ HIGHEST IN-A-ROW: BURST SCORE:   Task 1  Auditory mode with interactive game 30 48 7 11   Task 2 Auditory mode with interactive game with cog task   57 28 4 3   Task 3 : Auditory mode with interactive game with cog task 52 36 8 8   Task 4: Auditory mode with interactive game with cog task 64 24 5 3   Task 5: auditory mode  with conversation  57 34 7 1     Key:  MS AVERAGE (TASK SCORE): lower score = better performance    % SRO (TARGET RESPONSES): higher % = better performance    SRO HITS/HIGHEST IN-A-ROW: higher score = better performance    BURST SCORE: higher score = better performance    Cog Task 2/3: patient was given 4 words and had to list the reverse order of the words. Task completed in 13/20 opp (65% acc) independently, increasing to 20/20 opp (100% acc) from min cueing. Patient benefited from repetition    Cog task: 4: patient was given 4 words and had a f/u question regarding category inclusion. Task completed in 10/10 opp (100% acc) independently!            Patient will demonstrate divided/sustained/alternating/auditory attention tasks by responding to multiple tasks or details within tasks at the same time with min cues in a distracting environment with 80% accuracy     Plan:  -Patient was provided with home exercises/activities to target goals in plan of care at the end of today's session.  -Continue with current plan of care.

## 2024-12-17 ENCOUNTER — APPOINTMENT (OUTPATIENT)
Age: 49
End: 2024-12-17
Payer: COMMERCIAL

## 2024-12-17 ENCOUNTER — OFFICE VISIT (OUTPATIENT)
Age: 49
End: 2024-12-17
Payer: COMMERCIAL

## 2024-12-17 DIAGNOSIS — R53.1 RIGHT SIDED WEAKNESS: Primary | ICD-10-CM

## 2024-12-17 PROCEDURE — 97168 OT RE-EVAL EST PLAN CARE: CPT

## 2024-12-17 NOTE — PROGRESS NOTES
OCCUPATIONAL THERAPY RE-EVALUATION/DISCHARGE    POC expires Auth Status Total   Visits  Start date  Expiration date PT/OT + Visit Limit? Co-Insurance   24 BOMN  10/10/24  52 per year for all therapies                                             Visit/Unit Tracking  AUTH Status: BOMN Date 10/10 10/14 10/28 11/4 11/11 11/18 11/25 12/4 12/10 12/17     Visits  Authed:  Used 1 (IE) 1 1 1  1 1 1 1 1 (RE-EVAL/DISCHARGE)      Remaining                  PLAN OF CARE START: 10/10/24  PLAN OF CARE END: 2024  PROGRESS NOTE DUE: NA discharge today  FREQUENCY: NA discharge today   PRECAUTIONS: none  DIAGNOSIS: CVA (2024), R side weakness   VISITS: 10 of 17         Today's date: 2024  Patient name: Carolina Allison  : 1975  MRN: [id number]  Referring provider: Yovani Rudolph MD  Dx:   Encounter Diagnosis     ICD-10-CM    1. Right sided weakness  R53.1               SKILLED ANALYSIS:  Pt is a right hand dominant 49 year old female presenting to OP OT for re-evaluation on 24 s/p hx of stroke in 2024 with R side weakness.  Pt is independent with ADLs with IADLs. Pt requires increased time for management of heavy items with laundry basket. Pt reports improvement with writing, typing, Jar management requires increased time. Decrease in R UE fatigue with extensive exercise or IADL management. Pt has theraputty, therabands, and coordination handouts. Pt is now back to work full time since injury. Driving is going well per pt report. Post testing, pt demonstrated significant improvement in all categories, now WFL for some age related norms pending test performed. At this time patient has achieved maximal level of functional independence. Pt has demonstrated excellent understanding of HEPs and has made excellent progress in skilled OT services. Recommendation to discharge at this time. Instructed to return to therapy services in a noticeable decline occurs. Pt educated on  progress/therapy process and pt in understanding and agreement of recommendations. Recommending to continue HEP.     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 female who was referred to Occupational Therapy s/p  Right sided weakness [R53.1]. Pt has hx of stroke with R side weakness. Has hx of Enoree Palsy (2 bouts total). Hx of stroke on . Pt was discharged to home health and now presents to OP OT. Pt is currently in OP SLP services. Was recently discharged from OP PT.      PMH:   Past Medical History:   Diagnosis Date    Anemia     Anesthesia     Cardiac event    Asthma     Bell's palsy     COVID-19 10/20/2020    Diabetes mellitus (HCC)     borderline-diet controlled    Diabetes mellitus during pregnancy     Disease of thyroid gland     Facial droop 2024    Hashimoto's disease     Migraine     Right sided weakness 2024       Past Surgical Hx:   Past Surgical History:   Procedure Laterality Date     SECTION      HYSTEROSCOPY      with resection of intrauterine septum    MI HYSTEROSCOPY BX ENDOMETRIUM&/POLYPC W/WO D&C N/A 2023    Procedure: DILATATION AND CURETTAGE (D&C) WITH HYSTEROSCOPY;  Surgeon: Dunia Sarmiento MD;  [facility]: [facility];  Service: Gynecology    MI LAPS TOTAL HYSTERECT 250 GM/< W/RMVL TUBE/OVARY Bilateral 2023    Procedure: HYSTERECTOMY LAPAROSCOPIC TOTAL WITH BILATERAL SALPINGO-OOPHERECTOMY;  Surgeon: Kailey Aguirre MD;  Location: [facility];   Service: Gynecology    US GUIDED THYROID BIOPSY  6/13/2019    US GUIDED THYROID BIOPSY  9/19/2019          Objective  Impairment Observations:     R UE status on 12/17/24 R UE status on 11/18/24 R UE Status on IE:10/10/24 L UE Status on IE: 10/10/24 Comments           UPPER EXTREMITY FUNCTION     Impaired Intact Dominant Hand: R UE     /PINCH STRENGTH               Dynamometer      - Gross Grasp  abnormal - R UE norm: 62lbs 60lbs 45lbs 34 lbs 55 lbs    Pinch Meter       - Pincer  abnormal - R UE norm: 13lbs 11lbs 8lbs 8 lbs 9 lbs     - Tripod  abnormal - R UE norm: 17lbs 13lbs 10lbs 6 lbs 11 lbs     - Lateral  abnormal - R UE norm: 17lbs 14lbs 11lbs 11 lbs  12 lbs      COORDINATION        9 Hole Peg Test-  assesses dexterity/fine motor coordination     abnormal - R UE norm: 18 seconds   18 seconds   24 seconds 24 seconds        Fxnl Dexterity Test-assesses patient's ability to use the hand for daily tasks requiring a 3-jaw ojse r prehension between the fingers and the thumb    abnormal - R UE norm: 22 seconds  19 seconds  25 seconds  29 seconds 32 seconds          SHORT TERM GOALS (4-6 weeks)    GOAL  STATUS ON IE  GOAL STATUS    Pt will increase R UE  strength by 2 lbs to complete work roles and IADLs 34lbs ACHIEVED         Pt will increase  R UE 2 point pinch strength by 2lbs to complete functional ADLs and IADLs with increased independence   8lbs ACHIEVED          Pt will increase  R UE 3 point pinch strength by 2lbs to complete functional ADLs and IADLs with increased independence   6lbs ACHIEVED      Pt will increase  R UE lateral  pinch strength by 2lbs to complete functional ADLs and IADLs with increased independence   11lbs ACHIEVED          Pt will demonstrate improved in hand manipulation skills as evidenced by performing functional dexterity test with R UE in 27 seconds or less in order to increase independence with work related roles 29 seconds ACHIEVED        LONG TERM GOALS( 6-8 weeks)    GOAL   STATUS ON IE  GOAL STATUS    Pt will increase R UE  strength by 4 lbs to complete work roles and IADLs 24lbs ACHIEVED        Pt will increase  R UE 3 point pinch strength by 4lbs to complete functional ADLs and IADLs with increased independence   6lbs ACHIEVED        Pt will increase  R UE lateral  pinch strength by 3lbs to complete functional ADLs and IADLs with increased independence   11lbs ACHIEVED      Pt will demonstrate improved FMC as evidenced by performing 9 hole peg test with R UE in 21 seconds or less in order to increase independence with work related roles 24lbs ACHIEVED      Pt will demonstrate improved in hand manipulation skills as evidenced by performing functional dexterity test with R UE in 25 seconds or less in order to increase independence with work related roles 29 seconds ACHIEVED        Pt will demo with G carryover of Home Exercise Program to improve functional progression towards goals in plan of care and for improved functional use of UE  ACHIEVED              OTHER PLANNED THERAPY INTERVENTIONS:   Supine, seated, and in stance neuro re-ed  Tricep AG  NMES/FES  FMC/prehension  Timed Trials  Manual tx  Hand to target  Sensory re-ed  Seated functional reach: crossing midline  Supine place and hold  WBearing strategies   Closed chain activities  Open chain activities  Internal and external memory aides  Multimatrix for saccades/ visual clutter/attention  Hypersensitivity strategies education  Multi-modal environment  Sustained/alternating/divided attention  Tracking tube  Oculomotor control:  saccades, con/divergence  Conv./div. Dynamic tasks  Work stations with timed transitions  Temporal Awareness  Memory and mental manipulation  Auditory processing with immediate recall  Memory retention with immediate and delayed recall  Edu on cog/vision apps

## 2024-12-19 ENCOUNTER — OFFICE VISIT (OUTPATIENT)
Age: 49
End: 2024-12-19
Payer: COMMERCIAL

## 2024-12-19 DIAGNOSIS — I63.9 CEREBROVASCULAR ACCIDENT (CVA), UNSPECIFIED MECHANISM (HCC): ICD-10-CM

## 2024-12-19 DIAGNOSIS — R48.8 OTHER SYMBOLIC DYSFUNCTIONS: Primary | ICD-10-CM

## 2024-12-19 DIAGNOSIS — R41.89 COGNITIVE DEFICITS: ICD-10-CM

## 2024-12-19 PROCEDURE — 92507 TX SP LANG VOICE COMM INDIV: CPT

## 2024-12-19 NOTE — PROGRESS NOTES
Speech-Language Pathology - Discharge    Today's date: 2024   Patient’s name: Carolina Allison  : 1975  MRN: [id number]  Safety measures:   Referring provider: Yovani Rudolph MD    Encounter Diagnosis     ICD-10-CM    1. Other symbolic dysfunctions  R48.8       2. Cognitive deficits  R41.89       3. Cerebrovascular accident (CVA), unspecified mechanism (HCC)  I63.9           Assessment: Patient presents with improved cognitive deficits associated with her CVA. Patient is tolerating her return to work well. Patient consistently completed HEP and utilized strategies/skills taught by clinician. Patient has achieved all goals in POC. Patient to be discharged to an independent Home Exercise Program. Patient is in agreement.       Short-term goals: (to be achieved in 6-8 weeks)    Patient will facilitate planning by completing thought organization tasks (e.g., sequencing, deduction puzzles, etc.) with 80% accuracy to facilitate increased executive functioning, working memory, problem solving, and processing skills MET      Patient will demonstrate divided/sustained/alternating/auditory attention tasks by responding to multiple tasks or details within tasks at the same time with min cues in a distracting environment with 80% accuracy  MET    Long Term Goals    Patient will demonstrate cognitive-communication skills consistent with age and education given use of compensatory strategies when needed to resume baseline activities and responsibilities in home, community, and work/school settings by discharge.  MET    Patient will complete cognitive-linguistic therapy that addresses patient's specific deficits in processing speed, short-term working memory, attention to detail, monitoring, sequencing, and organization skills, with instruction, to alleviate effects of executive functioning disorder deficits by discharge.  MET    Patient will complete higher-level expressive language tasks (e.g., word definitions,  "idioms, synonym/antonyms, etc) with 80% accuracy to improve functional communication skills by discharge. MET      Plan  Recommendations:  -Patient to be discharged from outpatient skilled Speech Therapy services: Patient has achieved maximum potential. If patient would like to resume therapy in the future, a new prescription will be required.    -Frequency: No treatment is warranted at this time.  -Duration: N/A     Subjective  Patient's goal(s): \"to return to baseline\" -achieved      Objective (testing)  NO testing warranted today.      Treatment  Reviewed internal & external memory aid strategies and how to implement into work and ADLs.      Visit tracking:    Visit Tracking:  POC expires Unit limit Auth Expiration date PT/OT + Visit Limit?   12/4/24 No cog tx codes 3/2/2025 52 PT, OT, ST combined   12/21 5/23/2025 12                     Visit/Unit Tracking  AUTH Status:  Date 9/4  IE 9/10 9/12 9/17 9/19 10/1 10/3  RE 10/7 10/10 10/17 10/22 10/29 11/4 11/7 11/11 11/14 11/18 11/21  Re   yes Used 1 2 3 4 5 6 7 8 1 2 3 4 5 6 7 8 9 10    Remaining  7 6 5 4 3 2 1 0 9 8 7 6 5 4 3 2 1 0     11/25 12/3 12/10 12/19                   1 2 3 4                   7 6 5 4                       Intervention comments:  30 mins of speech language tx  "

## 2025-01-06 DIAGNOSIS — I10 PRIMARY HYPERTENSION: ICD-10-CM

## 2025-01-07 RX ORDER — LISINOPRIL 5 MG/1
5 TABLET ORAL DAILY
Qty: 90 TABLET | Refills: 1 | Status: SHIPPED | OUTPATIENT
Start: 2025-01-07

## 2025-04-21 ENCOUNTER — HOSPITAL ENCOUNTER (OUTPATIENT)
Dept: MAMMOGRAPHY | Facility: CLINIC | Age: 50
Discharge: HOME | End: 2025-04-21
Attending: INTERNAL MEDICINE
Payer: COMMERCIAL

## 2025-04-21 DIAGNOSIS — Z12.31 ENCOUNTER FOR SCREENING MAMMOGRAM FOR MALIGNANT NEOPLASM OF BREAST: ICD-10-CM

## 2025-04-21 PROCEDURE — 77063 BREAST TOMOSYNTHESIS BI: CPT

## 2025-04-21 PROCEDURE — 77067 SCR MAMMO BI INCL CAD: CPT

## 2025-04-22 DIAGNOSIS — E06.3 HASHIMOTO'S THYROIDITIS: ICD-10-CM

## 2025-04-22 NOTE — TELEPHONE ENCOUNTER
Requested Prescriptions     Pending Prescriptions Disp Refills    levothyroxine 100 mcg tablet 90 tablet 0     Sig: Take one tab po every other day (alt with 112 mcg)       LAST SEEN: 11/15/24  NEXT APPT: 6/5/25  LAST FILLED: 9/30/24

## 2025-04-23 RX ORDER — LEVOTHYROXINE SODIUM 100 UG/1
TABLET ORAL
Qty: 90 TABLET | Refills: 1 | Status: SHIPPED | OUTPATIENT
Start: 2025-04-23

## 2025-05-22 ENCOUNTER — RA CDI HCC (OUTPATIENT)
Dept: OTHER | Facility: HOSPITAL | Age: 50
End: 2025-05-22

## 2025-05-22 NOTE — PROGRESS NOTES
HCC coding opportunities          Chart Reviewed number of suggestions sent to Provider: 1   [phone]    Patients Insurance        Commercial Insurance: [employer]

## 2025-05-29 DIAGNOSIS — F41.9 ANXIETY: ICD-10-CM

## 2025-05-29 RX ORDER — ESCITALOPRAM OXALATE 5 MG/1
5 TABLET ORAL DAILY
Qty: 90 TABLET | Refills: 1 | Status: SHIPPED | OUTPATIENT
Start: 2025-05-29

## 2025-05-29 NOTE — TELEPHONE ENCOUNTER
Requested Prescriptions     Pending Prescriptions Disp Refills    escitalopram (LEXAPRO) 5 mg tablet 90 tablet 1     Sig: Take 1 tablet (5 mg total) by mouth daily       LAST SEEN: 11/15/24  NEXT APPT: 6/2/25  LAST FILLED: 11/8/24

## 2025-06-02 ENCOUNTER — OFFICE VISIT (OUTPATIENT)
Dept: INTERNAL MEDICINE CLINIC | Facility: CLINIC | Age: 50
End: 2025-06-02
Payer: COMMERCIAL

## 2025-06-02 VITALS
HEART RATE: 107 BPM | HEIGHT: 65 IN | BODY MASS INDEX: 44.15 KG/M2 | DIASTOLIC BLOOD PRESSURE: 72 MMHG | OXYGEN SATURATION: 96 % | SYSTOLIC BLOOD PRESSURE: 126 MMHG | WEIGHT: 265 LBS

## 2025-06-02 DIAGNOSIS — E11.9 TYPE 2 DIABETES MELLITUS WITHOUT COMPLICATION, WITHOUT LONG-TERM CURRENT USE OF INSULIN (HCC): ICD-10-CM

## 2025-06-02 DIAGNOSIS — J45.909 ASTHMA, UNSPECIFIED ASTHMA SEVERITY, UNSPECIFIED WHETHER COMPLICATED, UNSPECIFIED WHETHER PERSISTENT: ICD-10-CM

## 2025-06-02 DIAGNOSIS — R23.3 EASY BRUISING: ICD-10-CM

## 2025-06-02 DIAGNOSIS — Z00.00 ANNUAL PHYSICAL EXAM: Primary | ICD-10-CM

## 2025-06-02 DIAGNOSIS — E06.3 HASHIMOTO'S THYROIDITIS: ICD-10-CM

## 2025-06-02 DIAGNOSIS — Z12.11 COLON CANCER SCREENING: ICD-10-CM

## 2025-06-02 LAB — SL AMB POCT HEMOGLOBIN AIC: 7.5 (ref ?–6.5)

## 2025-06-02 PROCEDURE — 83036 HEMOGLOBIN GLYCOSYLATED A1C: CPT | Performed by: INTERNAL MEDICINE

## 2025-06-02 PROCEDURE — 99396 PREV VISIT EST AGE 40-64: CPT | Performed by: INTERNAL MEDICINE

## 2025-06-02 PROCEDURE — 99214 OFFICE O/P EST MOD 30 MIN: CPT | Performed by: INTERNAL MEDICINE

## 2025-06-02 NOTE — PROGRESS NOTES
Adult Annual Physical  Name: Carolina Allison      : 1975      MRN: [id number]  Encounter Provider: Yovani Rudolph MD  Encounter Date: 2025   Encounter department: [facility] [place]    :  Assessment & Plan  Annual physical exam  Completed.       Type 2 diabetes mellitus without complication, without long-term current use of insulin (HCC)    Lab Results   Component Value Date    HGBA1C 7.5 (A) 2025         Continue metformin and lantus until she can get the mounjaro, she still has not started the mounjaro.  Orders:    POCT hemoglobin A1c    CBC and differential; Future    Lipid Panel with Direct LDL reflex; Future    Albumin / creatinine urine ratio; Future    metFORMIN (GLUCOPHAGE) 1000 MG tablet; Take 1 tablet (1,000 mg total) by mouth 2 (two) times a day with meals     Hashimoto's thyroiditis  Check TSH. Continue alternating doses of levothyroxine. Diagnosed 7 years ago.    Orders:    TSH, 3rd generation with Free T4 reflex; Future     Easy bruising  Check CBC.   Orders:    CBC and differential; Future    Asthma, unspecified asthma severity, unspecified whether complicated, unspecified whether persistent  Controlled. R/t allergies and cold. weather.       Colon cancer screening    Orders:    Ambulatory Referral to Gastroenterology; Future        Preventive Screenings:  - Diabetes Screening: has diabetes and orders placed  - Cholesterol Screening: has hyperlipidemia and orders placed   - Hepatitis C screening: screening up-to-date   - HIV screening: patient declines   - Cervical cancer screening: screening not indicated   - Breast cancer screening: screening up-to-date   - Colon cancer screening: orders placed   - Lung cancer screening: screening not indicated     Immunizations:  - Immunizations due: Prevnar 20 and Tdap    Counseling/Anticipatory Guidance:    - Diet: discussed recommendations for a healthy/well-balanced diet.   - Exercise: the importance of regular  exercise/physical activity was discussed. Recommend exercise 3-5 times per week for at least 30 minutes.   - Injury prevention: discussed safety/seat belts, safety helmets, smoke detectors, carbon monoxide detectors, and smoking near bedding or upholstery.       Depression Screening and Follow-up Plan: Patient was screened for depression during today's encounter. They screened negative with a PHQ-2 score of 0.          History of Present Illness     Adult Annual Physical:  Patient presents for annual physical.     Diet and Physical Activity:  - Diet/Nutrition: no special diet.  - Exercise: no formal exercise.    Depression Screening:  - PHQ-2 Score: 0    General Health:  - Sleep: sleeps well.  - Hearing: normal hearing bilateral ears.  - Vision: no vision problems and wears glasses.  - Dental: regular dental visits.    /GYN Health:  - Follows with GYN: no.   - Menopause: postmenopausal.   - History of STDs: no  - Contraception: hysterectomy.      Advanced Care Planning:  - Has an advanced directive?: no    - Has a durable medical POA?: no    - ACP document given to patient?: no      Review of Systems   Constitutional:  Negative for chills and fever.   HENT:  Negative for ear pain and sore throat.    Eyes:  Negative for pain and visual disturbance.   Respiratory:  Negative for cough and shortness of breath.    Cardiovascular:  Negative for chest pain and palpitations.   Gastrointestinal:  Negative for abdominal pain and vomiting.   Genitourinary:  Negative for dysuria and hematuria.   Musculoskeletal:  Negative for arthralgias and back pain.   Skin:  Negative for color change and rash.   Neurological:  Negative for seizures and syncope.   All other systems reviewed and are negative.    Past Medical History   Past Medical History[1]  Past Surgical History[2]  Family History[3]   reports that she has never smoked. She has never used smokeless tobacco. She reports current alcohol use. She reports that she does not use  "drugs.  Current Outpatient Medications   Medication Instructions    acetaminophen (TYLENOL) 650 mg, Oral, Every 6 hours PRN    aspirin (ECOTRIN LOW STRENGTH) 81 mg, Daily    Blood Glucose Monitoring Suppl ([employer] Blood Glucose Meter) w/Device KIT Test blood sugar three times daily.    escitalopram (LEXAPRO) 5 mg, Oral, Daily    glucose blood (OneTouch Ultra) test strip DX: E11.9. Test blood sugar three times daily.    glucose blood test strip Test blood sugar three times daily.    ibuprofen (MOTRIN) 600 mg, Oral, Every 6 hours PRN    insulin glargine (LANTUS) 10 Units, Subcutaneous, Daily at bedtime    Lancets (Pushercan Unistik 2) MISC Test blood sugar three times daily.    levothyroxine 100 mcg tablet Take one tab po every other day (alt with 112 mcg)    levothyroxine 112 mcg tablet Take one tab po every other day (alt with 110 mcg)    lisinopril (ZESTRIL) 5 mg, Oral, Daily    metFORMIN (GLUCOPHAGE) 1,000 mg, Oral, 2 times daily with meals    tirzepatide 2.5 mg, Subcutaneous, Every 7 days   Allergies[4]     Objective   /72 (BP Location: Left arm, Patient Position: Sitting, Cuff Size: Adult)   Pulse (!) 107   Ht 5' 5\" (1.651 m)   Wt 120 kg (265 lb)   LMP 09/01/2023   SpO2 96%   BMI 44.10 kg/m²     Physical Exam  Vitals and nursing note reviewed.   Constitutional:       General: She is not in acute distress.     Appearance: She is well-developed.   HENT:      Head: Normocephalic and atraumatic.     Eyes:      Conjunctiva/sclera: Conjunctivae normal.       Cardiovascular:      Rate and Rhythm: Normal rate and regular rhythm.      Heart sounds: No murmur heard.  Pulmonary:      Effort: Pulmonary effort is normal. No respiratory distress.      Breath sounds: Normal breath sounds.   Abdominal:      Palpations: Abdomen is soft.      Tenderness: There is no abdominal tenderness.     Musculoskeletal:         General: No swelling.      Cervical back: Neck supple.     Skin:     General: Skin is warm and dry.      " Capillary Refill: Capillary refill takes less than 2 seconds.     Neurological:      Mental Status: She is alert.     Psychiatric:         Mood and Affect: Mood normal.              [1]   Past Medical History:  Diagnosis Date    Anemia     Anesthesia     Cardiac event    Asthma     Bell's palsy     COVID-19 10/20/2020    Diabetes mellitus (HCC)     borderline-diet controlled    Diabetes mellitus during pregnancy     Disease of thyroid gland     Facial droop 2024    Hashimoto's disease     Migraine     Right sided weakness 2024   [2]   Past Surgical History:  Procedure Laterality Date     SECTION      HYSTEROSCOPY      with resection of intrauterine septum    [place] HYSTEROSCOPY BX ENDOMETRIUM&/POLYPC W/WO D&C N/A 2023    Procedure: DILATATION AND CURETTAGE (D&C) WITH HYSTEROSCOPY;  Surgeon: Dunia Sarmiento MD;  Location: [facility];  Service: Gynecology    TN LAPS TOTAL HYSTERECT 250 GM/< W/RMVL TUBE/OVARY Bilateral 2023    Procedure: HYSTERECTOMY LAPAROSCOPIC TOTAL WITH BILATERAL SALPINGO-OOPHERECTOMY;  Surgeon: Kailey Aguirre MD;  Location[facility];  Service: Gynecology    US GUIDED THYROID BIOPSY  2019    US GUIDED THYROID BIOPSY  2019   [3]   Family History  Problem Relation Name Age of Onset    Migraines Mother      Heart disease Father Santosh Goucher         cardiac disorder    Hyperlipidemia Father Santosh Goucher     Hypertension Father Santosh Goucher     Breast cancer Maternal Grandmother Mirian Alonso     Ovarian cancer Paternal Grandmother Mayela Frable     Cancer Paternal Grandmother Mayela Frable     Cervical cancer Maternal Aunt davon     Endometrial cancer Maternal Aunt davon 20    Breast cancer Paternal Aunt luis carlos     Endometrial cancer Paternal Aunt luis carlos 30    Uterine cancer Paternal Aunt luis carlos     Ovarian cancer Paternal Aunt luis carlos     Colon cancer Neg Hx     [4]   Allergies  Allergen Reactions    Iodinated Contrast Media Anaphylaxis      SOB, chest tightness, required epinephrine

## 2025-06-02 NOTE — ASSESSMENT & PLAN NOTE
Lab Results   Component Value Date    HGBA1C 7.5 (A) 06/02/2025         Continue metformin and lantus until she can get the mounjaro, she still has not started the mounjaro.  Orders:    POCT hemoglobin A1c    CBC and differential; Future    Lipid Panel with Direct LDL reflex; Future    Albumin / creatinine urine ratio; Future    metFORMIN (GLUCOPHAGE) 1000 MG tablet; Take 1 tablet (1,000 mg total) by mouth 2 (two) times a day with meals

## 2025-06-02 NOTE — ASSESSMENT & PLAN NOTE
Check TSH. Continue alternating doses of levothyroxine. Diagnosed 7 years ago.    Orders:    TSH, 3rd generation with Free T4 reflex; Future

## 2025-06-24 ENCOUNTER — VBI (OUTPATIENT)
Dept: ADMINISTRATIVE | Facility: OTHER | Age: 50
End: 2025-06-24

## 2025-06-24 NOTE — TELEPHONE ENCOUNTER
06/24/25 1:19 PM     Chart reviewed for CRC: Colonoscopy was/were not submitted to the patient's insurance.     Fariha Saab MA   [facility]

## 2025-08-04 DIAGNOSIS — I10 PRIMARY HYPERTENSION: ICD-10-CM

## 2025-08-04 RX ORDER — LISINOPRIL 5 MG/1
5 TABLET ORAL DAILY
Qty: 90 TABLET | Refills: 1 | Status: SHIPPED | OUTPATIENT
Start: 2025-08-04

## (undated) DEVICE — DRAPE EQUIPMENT RF WAND

## (undated) DEVICE — CHLORHEXIDINE 4PCT 4 OZ

## (undated) DEVICE — SCD SEQUENTIAL COMPRESSION COMFORT SLEEVE MEDIUM KNEE LENGTH: Brand: KENDALL SCD

## (undated) DEVICE — TISSUE REMOVAL SYSTEM RESECTING DEVICE: Brand: SYMPHION

## (undated) DEVICE — TISSUE REMOVAL SYSTEM FLUID MANAGEMENT ACCESSORIES: Brand: SYMPHION

## (undated) DEVICE — SPONGE 4 X 4 XRAY 16 PLY STRL LF RFD

## (undated) DEVICE — HARMONIC 1100 SHEARS, 36CM SHAFT LENGTH: Brand: HARMONIC

## (undated) DEVICE — MAYO STAND COVER: Brand: CONVERTORS

## (undated) DEVICE — METZENBAUM ADTEC SINGLE USE DISSECTING SCISSORS, SHAFT ONLY, MONOPOLAR, CURVED TO LEFT, WORKING LENGTH: 12 1/4", (310 MM), DIAM. 5 MM, INSULATED, DOUBLE ACTION, STERILE, DISPOSABLE, PACKAGE OF 10 PIECES: Brand: AESCULAP

## (undated) DEVICE — TROCAR: Brand: KII FIOS FIRST ENTRY

## (undated) DEVICE — TIBURON LAPAROSCOPIC ABDOMINAL DRAPE: Brand: CONVERTORS

## (undated) DEVICE — STRL ALLENTOWN HYSTEROSCOPY PK: Brand: CARDINAL HEALTH

## (undated) DEVICE — HEAVY DUTY TABLE COVER: Brand: CONVERTORS

## (undated) DEVICE — 1820 FOAM BLOCK NEEDLE COUNTER: Brand: DEVON

## (undated) DEVICE — IRRIG ENDO FLO TUBING

## (undated) DEVICE — SUTURING DEVICE: Brand: ENDO STITCH

## (undated) DEVICE — TOWEL SURG XR DETECT GREEN STRL RFD

## (undated) DEVICE — SUT MONOCRYL 4-0 PS-2 18 IN Y496G

## (undated) DEVICE — TUBING SMOKE EVAC W/FILTRATION DEVICE PLUMEPORT ACTIV

## (undated) DEVICE — ADHESIVE SKIN HIGH VISCOSITY EXOFIN 1ML

## (undated) DEVICE — GLOVE INDICATOR UNDERGLOVE SZ 6 BLUE

## (undated) DEVICE — UTERINE MANIPULATOR RUMI DISP TIP 6.7X12CM ORANGE

## (undated) DEVICE — TROCAR: Brand: KII® SLEEVE

## (undated) DEVICE — 4-PORT MANIFOLD: Brand: NEPTUNE 2

## (undated) DEVICE — SPONGE LAP 18 X 18 IN STRL RFD

## (undated) DEVICE — [HIGH FLOW INSUFFLATOR,  DO NOT USE IF PACKAGE IS DAMAGED,  KEEP DRY,  KEEP AWAY FROM SUNLIGHT,  PROTECT FROM HEAT AND RADIOACTIVE SOURCES.]: Brand: PNEUMOSURE

## (undated) DEVICE — CHLORAPREP HI-LITE 26ML ORANGE

## (undated) DEVICE — INTENDED FOR TISSUE SEPARATION, AND OTHER PROCEDURES THAT REQUIRE A SHARP SURGICAL BLADE TO PUNCTURE OR CUT.: Brand: BARD-PARKER SAFETY BLADES SIZE 11, STERILE

## (undated) DEVICE — 40583 XL ADVANCED TRENDELENBURG POSITIONING KIT: Brand: 40583 XL ADVANCED TRENDELENBURG POSITIONING KIT

## (undated) DEVICE — NEPTUNE E-SEP SMOKE EVACUATION PENCIL, COATED, 70MM BLADE, PUSH BUTTON SWITCH: Brand: NEPTUNE E-SEP

## (undated) DEVICE — TRAY FOLEY 16FR URIMETER SILICONE SURESTEP

## (undated) DEVICE — GLOVE INDICATOR PI UNDERGLOVE SZ 6.5 BLUE

## (undated) DEVICE — BETHLEHEM UNIVERSAL GYN LAP PK: Brand: CARDINAL HEALTH

## (undated) DEVICE — OCCLUDER COLPO-PNEUMO

## (undated) DEVICE — SYRINGE 50ML LL

## (undated) DEVICE — LIGHT HANDLE COVER SLEEVE DISP BLUE STELLAR

## (undated) DEVICE — PVC URETHRAL CATHETER: Brand: DOVER

## (undated) DEVICE — ELECTRODE LAP SPATULA CRV E-Z CLEAN 33CM -0018C